# Patient Record
Sex: FEMALE | Race: WHITE | NOT HISPANIC OR LATINO | Employment: UNEMPLOYED | URBAN - NONMETROPOLITAN AREA
[De-identification: names, ages, dates, MRNs, and addresses within clinical notes are randomized per-mention and may not be internally consistent; named-entity substitution may affect disease eponyms.]

---

## 2019-11-22 ENCOUNTER — TELEPHONE (OUTPATIENT)
Dept: OBSTETRICS AND GYNECOLOGY | Facility: CLINIC | Age: 34
End: 2019-11-22

## 2019-11-22 RX ORDER — ONDANSETRON 8 MG/1
8 TABLET, ORALLY DISINTEGRATING ORAL EVERY 8 HOURS PRN
Qty: 30 TABLET | Refills: 1 | Status: SHIPPED | OUTPATIENT
Start: 2019-11-22 | End: 2020-02-05

## 2019-11-22 NOTE — TELEPHONE ENCOUNTER
This pt has a new OB appt on 11/26.  She called today c/o cramping and nausea x 2 days.  I advised her per Jennifer Shi to take 1000 mg of tylenol and comfort measures, warm bath, heating pad.  Also told the pt that if she starts bleeding bright red blood that she could go to the ER.  I sent in Zofran for her nausea.  Pt verbalized understanding of all info.

## 2019-11-26 ENCOUNTER — APPOINTMENT (OUTPATIENT)
Dept: LAB | Facility: HOSPITAL | Age: 34
End: 2019-11-26

## 2019-11-26 ENCOUNTER — INITIAL PRENATAL (OUTPATIENT)
Dept: OBSTETRICS AND GYNECOLOGY | Facility: CLINIC | Age: 34
End: 2019-11-26

## 2019-11-26 VITALS — DIASTOLIC BLOOD PRESSURE: 58 MMHG | BODY MASS INDEX: 23.74 KG/M2 | SYSTOLIC BLOOD PRESSURE: 90 MMHG | WEIGHT: 134 LBS

## 2019-11-26 DIAGNOSIS — Z32.00 PREGNANCY EXAMINATION OR TEST, PREGNANCY UNCONFIRMED: ICD-10-CM

## 2019-11-26 DIAGNOSIS — O21.9 NAUSEA AND VOMITING DURING PREGNANCY PRIOR TO 22 WEEKS GESTATION: ICD-10-CM

## 2019-11-26 DIAGNOSIS — Z34.01 PRIMIGRAVIDA IN FIRST TRIMESTER: ICD-10-CM

## 2019-11-26 DIAGNOSIS — Z32.01 POSITIVE PREGNANCY TEST: Primary | ICD-10-CM

## 2019-11-26 DIAGNOSIS — Z34.00 SUPERVISION OF NORMAL FIRST PREGNANCY, ANTEPARTUM: Primary | ICD-10-CM

## 2019-11-26 DIAGNOSIS — Z36.87 ENCOUNTER FOR ANTENATAL SCREENING FOR UNCERTAIN DATES: ICD-10-CM

## 2019-11-26 LAB
ABO GROUP BLD: NORMAL
AMPHET+METHAMPHET UR QL: NEGATIVE
AMPHETAMINES UR QL: NEGATIVE
B-HCG UR QL: POSITIVE
BARBITURATES UR QL SCN: NEGATIVE
BASOPHILS # BLD AUTO: 0.04 10*3/MM3 (ref 0–0.2)
BASOPHILS NFR BLD AUTO: 0.4 % (ref 0–1.5)
BENZODIAZ UR QL SCN: NEGATIVE
BLD GP AB SCN SERPL QL: NEGATIVE
BUPRENORPHINE SERPL-MCNC: NEGATIVE NG/ML
CANNABINOIDS SERPL QL: NEGATIVE
COCAINE UR QL: NEGATIVE
DEPRECATED RDW RBC AUTO: 38.2 FL (ref 37–54)
EOSINOPHIL # BLD AUTO: 0.12 10*3/MM3 (ref 0–0.4)
EOSINOPHIL NFR BLD AUTO: 1.2 % (ref 0.3–6.2)
ERYTHROCYTE [DISTWIDTH] IN BLOOD BY AUTOMATED COUNT: 12.7 % (ref 12.3–15.4)
HCT VFR BLD AUTO: 38.3 % (ref 34–46.6)
HGB BLD-MCNC: 13 G/DL (ref 12–15.9)
IMM GRANULOCYTES # BLD AUTO: 0.03 10*3/MM3 (ref 0–0.05)
IMM GRANULOCYTES NFR BLD AUTO: 0.3 % (ref 0–0.5)
INTERNAL NEGATIVE CONTROL: NEGATIVE
INTERNAL POSITIVE CONTROL: POSITIVE
LYMPHOCYTES # BLD AUTO: 1.32 10*3/MM3 (ref 0.7–3.1)
LYMPHOCYTES NFR BLD AUTO: 13.5 % (ref 19.6–45.3)
Lab: ABNORMAL
Lab: NORMAL
MCH RBC QN AUTO: 28.1 PG (ref 26.6–33)
MCHC RBC AUTO-ENTMCNC: 33.9 G/DL (ref 31.5–35.7)
MCV RBC AUTO: 82.7 FL (ref 79–97)
METHADONE UR QL SCN: NEGATIVE
MONOCYTES # BLD AUTO: 0.67 10*3/MM3 (ref 0.1–0.9)
MONOCYTES NFR BLD AUTO: 6.9 % (ref 5–12)
NEUTROPHILS # BLD AUTO: 7.6 10*3/MM3 (ref 1.7–7)
NEUTROPHILS NFR BLD AUTO: 77.7 % (ref 42.7–76)
NRBC BLD AUTO-RTO: 0 /100 WBC (ref 0–0.2)
OPIATES UR QL: NEGATIVE
OXYCODONE UR QL SCN: NEGATIVE
PCP UR QL SCN: NEGATIVE
PLATELET # BLD AUTO: 246 10*3/MM3 (ref 140–450)
PMV BLD AUTO: 11.1 FL (ref 6–12)
PROPOXYPH UR QL: NEGATIVE
RBC # BLD AUTO: 4.63 10*6/MM3 (ref 3.77–5.28)
RH BLD: NEGATIVE
TRICYCLICS UR QL SCN: NEGATIVE
WBC NRBC COR # BLD: 9.78 10*3/MM3 (ref 3.4–10.8)

## 2019-11-26 PROCEDURE — 87661 TRICHOMONAS VAGINALIS AMPLIF: CPT | Performed by: NURSE PRACTITIONER

## 2019-11-26 PROCEDURE — 81025 URINE PREGNANCY TEST: CPT | Performed by: NURSE PRACTITIONER

## 2019-11-26 PROCEDURE — 87086 URINE CULTURE/COLONY COUNT: CPT | Performed by: NURSE PRACTITIONER

## 2019-11-26 PROCEDURE — 85025 COMPLETE CBC W/AUTO DIFF WBC: CPT | Performed by: NURSE PRACTITIONER

## 2019-11-26 PROCEDURE — 86900 BLOOD TYPING SEROLOGIC ABO: CPT | Performed by: NURSE PRACTITIONER

## 2019-11-26 PROCEDURE — 86850 RBC ANTIBODY SCREEN: CPT | Performed by: NURSE PRACTITIONER

## 2019-11-26 PROCEDURE — 87591 N.GONORRHOEAE DNA AMP PROB: CPT | Performed by: NURSE PRACTITIONER

## 2019-11-26 PROCEDURE — 36415 COLL VENOUS BLD VENIPUNCTURE: CPT

## 2019-11-26 PROCEDURE — 86901 BLOOD TYPING SEROLOGIC RH(D): CPT | Performed by: NURSE PRACTITIONER

## 2019-11-26 PROCEDURE — 80307 DRUG TEST PRSMV CHEM ANLYZR: CPT | Performed by: NURSE PRACTITIONER

## 2019-11-26 PROCEDURE — 0501F PRENATAL FLOW SHEET: CPT | Performed by: NURSE PRACTITIONER

## 2019-11-26 PROCEDURE — 87491 CHLMYD TRACH DNA AMP PROBE: CPT | Performed by: NURSE PRACTITIONER

## 2019-11-26 PROCEDURE — 86803 HEPATITIS C AB TEST: CPT | Performed by: NURSE PRACTITIONER

## 2019-11-26 PROCEDURE — 80081 OBSTETRIC PANEL INC HIV TSTG: CPT | Performed by: NURSE PRACTITIONER

## 2019-11-26 PROCEDURE — 81003 URINALYSIS AUTO W/O SCOPE: CPT | Performed by: NURSE PRACTITIONER

## 2019-11-26 NOTE — PROGRESS NOTES
I spent approximately 60 minutes with the patient acquiring the health and history intake and discussing topics related to healthy lifestyle. This is her first pregnancy. She is accompanied by her . A newob bag is given. The 1st trimester teaching was done with the patient. We discussed a healthy diet and exercise and what is recommended. I also discussed Listeriosis and Toxoplasmosis and what fish to avoid due to high mercury levels. Informed patient not to be in hot tubs, saunas, or tanning beds. We discussed that spotting may occur after intercourse which is common, but if heavy bleeding like a period occurs to call [facility] or hospital if clinic is closed.  I encouraged her to make an appointment with the dentist if she has not had a dental exam and cleaning in the last 6 months. The patient denies use of alcohol, illicit drug use, and tobacco smoking. We discussed the hospital policy procedure if a patient has a positive urine drug screen at the time of admission to the hospital. She plans to breastfeed and formula feed. I gave her pamphlet on breastfeeding classes and the breastfeeding mothers support group that is provided by Gloria Puga, Lactation Consultant. She filled out a health department referral form. I informed patient that group prenatal education classes are offered here. I instructed patient to let the provider know if she would like to join a group after EDC is established. Her and her  are very interested in Centering.   I discussed with the patient that a pediatrician needs to be chosen prior to delivery for the infant to have an appointment scheduled before leaving the hospital.   I discussed lab tests will be done today. She is unsure when her last pap smear was. She states it has been years. I encouraged the patient to get the TDAP vaccine in the 3rd trimester. I told the patient that health departments are giving the TDAP vaccine to family members. All questions were  answered at this time.

## 2019-11-27 DIAGNOSIS — O21.9 NAUSEA AND VOMITING DURING PREGNANCY PRIOR TO 22 WEEKS GESTATION: Primary | ICD-10-CM

## 2019-11-27 PROBLEM — Z34.01 PRIMIGRAVIDA IN FIRST TRIMESTER: Status: ACTIVE | Noted: 2019-11-27

## 2019-11-27 LAB
BILIRUB UR QL STRIP: NEGATIVE
C TRACH RRNA CVX QL NAA+PROBE: NEGATIVE
CLARITY UR: ABNORMAL
COLOR UR: YELLOW
GLUCOSE UR STRIP-MCNC: NEGATIVE MG/DL
HBV SURFACE AG SERPL QL IA: NORMAL
HCV AB SER DONR QL: NORMAL
HGB UR QL STRIP.AUTO: NEGATIVE
HIV1+2 AB SER QL: NORMAL
KETONES UR QL STRIP: NEGATIVE
LEUKOCYTE ESTERASE UR QL STRIP.AUTO: NEGATIVE
N GONORRHOEA RRNA SPEC QL NAA+PROBE: NEGATIVE
NITRITE UR QL STRIP: NEGATIVE
PH UR STRIP.AUTO: 5.5 [PH] (ref 5–8)
PROT UR QL STRIP: NEGATIVE
RPR SER QL: NORMAL
RUBV IGG SERPL IA-ACNC: POSITIVE
SP GR UR STRIP: >=1.03 (ref 1–1.03)
TRICHOMONAS VAGINALIS PCR: NEGATIVE
UROBILINOGEN UR QL STRIP: ABNORMAL

## 2019-11-27 RX ORDER — DOXYLAMINE SUCCINATE AND PYRIDOXINE HYDROCHLORIDE, DELAYED RELEASE TABLETS 10 MG/10 MG 10; 10 MG/1; MG/1
2 TABLET, DELAYED RELEASE ORAL NIGHTLY PRN
Qty: 90 TABLET | Refills: 4 | Status: SHIPPED | OUTPATIENT
Start: 2019-11-27 | End: 2020-01-07

## 2019-11-27 RX ORDER — DOXYLAMINE SUCCINATE AND PYRIDOXINE HYDROCHLORIDE, DELAYED RELEASE TABLETS 10 MG/10 MG 10; 10 MG/1; MG/1
2 TABLET, DELAYED RELEASE ORAL NIGHTLY PRN
Qty: 60 TABLET | Refills: 4 | Status: SHIPPED | OUTPATIENT
Start: 2019-11-27 | End: 2019-11-27 | Stop reason: SDUPTHER

## 2019-11-28 LAB — BACTERIA SPEC AEROBE CULT: NO GROWTH

## 2019-11-28 NOTE — PROGRESS NOTES
"CC: Initial Prenatal visit    Zahra Gasca is a 34 y.o.  presents to establish prenatal care with complaints of nausea and vomitting.  Zofran, vitamin b12 and unisom providing little to no relief.  She denies any past or current medical issues.  Surgical history none.  Current medications zofran, unisom, and vitamin b12.  They would like down syndrome screening due to father of baby's brother having \"mentality of twelve year old\" not sure of exact diagnosis.      ROS: +N/V.  Pt denies cramping, dysuria, or vaginal bleeding.      PE: see NOB physical in episode tab, PE non-remarkable.  V scan utilized +HHUS           Problems (from 19 to present)     Problem Noted Resolved    Primigravida in first trimester 2019 by Jennifer Shi APRN No    Nausea and vomiting during pregnancy prior to 22 weeks gestation 2019 by Jennifer Shi APRN No          A/P: Zahra Gasca is a 34 y.o.  at Unknown.  - RTC in 2 weeks for KOFFI appt and NIPS labs     Diagnosis Plan   1. Positive pregnancy test     2. Encounter for  screening for uncertain dates  US Ob Transvaginal   3. Nausea and vomiting during pregnancy prior to 22 weeks gestation  RBA Diclegis and reviewed potential side effects  doxylamine-pyridoxine (DICLEGIS) 10-10 MG tablet delayed-release EC tablet  Small frequent protein filled snacks   4. Primigravida in first trimester  Pt prefers pap at 6 wk pp        JOANNA Simmons  2019  6:06 PM    "

## 2019-12-10 ENCOUNTER — ROUTINE PRENATAL (OUTPATIENT)
Dept: OBSTETRICS AND GYNECOLOGY | Facility: CLINIC | Age: 34
End: 2019-12-10

## 2019-12-10 VITALS — SYSTOLIC BLOOD PRESSURE: 108 MMHG | DIASTOLIC BLOOD PRESSURE: 62 MMHG | WEIGHT: 135 LBS | BODY MASS INDEX: 23.91 KG/M2

## 2019-12-10 DIAGNOSIS — Z67.91 RH NEGATIVE STATUS DURING PREGNANCY IN FIRST TRIMESTER: ICD-10-CM

## 2019-12-10 DIAGNOSIS — Z34.01 PRIMIGRAVIDA IN FIRST TRIMESTER: ICD-10-CM

## 2019-12-10 DIAGNOSIS — O26.891 RH NEGATIVE STATUS DURING PREGNANCY IN FIRST TRIMESTER: ICD-10-CM

## 2019-12-10 DIAGNOSIS — O21.9 NAUSEA AND VOMITING DURING PREGNANCY PRIOR TO 22 WEEKS GESTATION: ICD-10-CM

## 2019-12-10 DIAGNOSIS — Z3A.11 11 WEEKS GESTATION OF PREGNANCY: Primary | ICD-10-CM

## 2019-12-10 PROCEDURE — 0502F SUBSEQUENT PRENATAL CARE: CPT | Performed by: NURSE PRACTITIONER

## 2019-12-10 NOTE — PROGRESS NOTES
CC: Prenatal visit    Zahra Gasca is a 34 y.o.  at 11w0d per LMP.  Patient reports overall she is doing well since starting Diclegis every night at bedtime.  She was unable to take morning dose due to it making her drowsy.      /62   Wt 61.2 kg (135 lb)   LMP 2019   BMI 23.91 kg/m²     Reviewed NOB labs and preliminary dating scan with pt and s/o- single intrauterine pregnancy, gestational sac wnl, yolk sac visualized, CRL 10w2d which c/w with LMP, CL 3.65cm, bilateral ovaries wnl, no fluid seen in cul de sac      Fetal Heart Rate: 171 us     Problems (from 19 to present)     Problem Noted Resolved    Primigravida in first trimester 2019 by Jennifer Shi APRN No    Nausea and vomiting during pregnancy prior to 22 weeks gestation 2019 by Jennifer Shi APRN No          A/P: Zahra Gasca is a 34 y.o.  at 11w0d per LMP.  - RTC in 4 weeks for KOFFI appt      Diagnosis Plan   1. 11 weeks gestation of pregnancy     2. Nausea and vomiting during pregnancy prior to 22 weeks gestation     3. Primigravida in first trimester     4. Rh negative status during pregnancy in first trimester  Needs rhogam at 28 weeks       JOANNA Simmons  12/10/2019  3:04 PM

## 2019-12-13 DIAGNOSIS — Z36.87 ENCOUNTER FOR ANTENATAL SCREENING FOR UNCERTAIN DATES: ICD-10-CM

## 2020-01-07 ENCOUNTER — ROUTINE PRENATAL (OUTPATIENT)
Dept: OBSTETRICS AND GYNECOLOGY | Facility: CLINIC | Age: 35
End: 2020-01-07

## 2020-01-07 VITALS — DIASTOLIC BLOOD PRESSURE: 62 MMHG | BODY MASS INDEX: 25.15 KG/M2 | WEIGHT: 142 LBS | SYSTOLIC BLOOD PRESSURE: 100 MMHG

## 2020-01-07 DIAGNOSIS — Z36.3 ANTENATAL SCREENING FOR MALFORMATION USING ULTRASONICS: ICD-10-CM

## 2020-01-07 DIAGNOSIS — Z34.01 PRIMIGRAVIDA IN FIRST TRIMESTER: ICD-10-CM

## 2020-01-07 DIAGNOSIS — O21.9 NAUSEA AND VOMITING DURING PREGNANCY PRIOR TO 22 WEEKS GESTATION: ICD-10-CM

## 2020-01-07 DIAGNOSIS — Z3A.15 15 WEEKS GESTATION OF PREGNANCY: Primary | ICD-10-CM

## 2020-01-07 PROCEDURE — 0502F SUBSEQUENT PRENATAL CARE: CPT | Performed by: NURSE PRACTITIONER

## 2020-01-07 RX ORDER — PRENATAL VIT NO.126/IRON/FOLIC 28MG-0.8MG
TABLET ORAL DAILY
COMMUNITY
End: 2022-06-22

## 2020-01-08 NOTE — PROGRESS NOTES
CC: Prenatal visit    Zahra Gasca is a 34 y.o.  at 15w1d.  Doing well, diclegis continues to help but high cost is bothersome.  Patient interested in trying Bonjesta because she was told its cheaper at our [facility] pharmacy.  Sent in script.  Denies cramping, dysuria, or vaginal bleeding.      /62   Wt 64.4 kg (142 lb)   LMP 2019   BMI 25.15 kg/m²        Fetal Heart Rate: 150     Problems (from 19 to present)     Problem Noted Resolved    Primigravida in first trimester 2019 by Jennifer Shi APRN No    Nausea and vomiting during pregnancy prior to 22 weeks gestation 2019 by Jennifer Shi APRN No          A/P: Zahra Gasca is a 34 y.o.  at 15w1d.  - RTC in 4 weeks for KOFFI appt and anatomy scan or sooner if needed     Diagnosis Plan   1. 15 weeks gestation of pregnancy     2. Nausea and vomiting during pregnancy prior to 22 weeks gestation  RBA Bonjesta   3. Primigravida in first trimester     4.  screening for malformation using ultrasonics  US Ob 14 + Weeks Single or First Gestation       JOANNA Simmons  2020  9:22 AM

## 2020-01-20 ENCOUNTER — TELEPHONE (OUTPATIENT)
Dept: OBSTETRICS AND GYNECOLOGY | Facility: CLINIC | Age: 35
End: 2020-01-20

## 2020-01-20 NOTE — TELEPHONE ENCOUNTER
Called to let patient know she did have refills at the [facility]. She states she thought they were at [facility] in [place]. Told the patient she could call and have them transferred.

## 2020-01-20 NOTE — TELEPHONE ENCOUNTER
PT IS REQUESTING A REFILL ON HER DICLEGIS.  SHE IS ONLY ABLE TO HOLD ANYTHING DOWN WHILE TAKING THIS MEDICINE.  PLEASE RETURN THE PT'S CALL [phone].

## 2020-01-22 ENCOUNTER — TELEPHONE (OUTPATIENT)
Dept: OBSTETRICS AND GYNECOLOGY | Facility: CLINIC | Age: 35
End: 2020-01-22

## 2020-01-22 NOTE — TELEPHONE ENCOUNTER
Patient called wanting to know results of her Prior Auth on a medication, I informed her that I finished the PA yesterday on 1/21/20 and that it could take up to 72 hours for it to be processed. Patient was very unhappy about that and continued to cuss at me, stating that she didn't understand why the F it was taking so long and it was total BS that we didn't care about anything she had to say or how sick she was. I let her know that we did care and that it was nothing that we could do any different that it is now in the hands of her insurance. She stated that she never had to wait on a PA in [place] so why the F should she have to here in [place], I let her know that it was a different state and that insurance is different everywhere and repeated that we had done everything we could do to get this taken care of. I let her know she was welcome to speak with our supervisor after she continued to cuss me, and she just said whatever, and hung up on me.

## 2020-01-22 NOTE — TELEPHONE ENCOUNTER
HAYLEY FROM [facility] CALLED TO LET YOU KNOW THAT A PA NEEDS TO BE DONE FOR DOXYLAMINE SUCCONATE PYRIDO.  PLEASE CONTACT [phone] TO AUTHORIZE THIS MEDICINE.

## 2020-01-23 ENCOUNTER — TELEPHONE (OUTPATIENT)
Dept: OBSTETRICS AND GYNECOLOGY | Facility: CLINIC | Age: 35
End: 2020-01-23

## 2020-01-23 NOTE — TELEPHONE ENCOUNTER
Received call from insurance that Bonjesta had been approved.  Called and left message on voicemail for patient informing her of this.

## 2020-01-28 ENCOUNTER — ROUTINE PRENATAL (OUTPATIENT)
Dept: OBSTETRICS AND GYNECOLOGY | Facility: CLINIC | Age: 35
End: 2020-01-28

## 2020-01-28 VITALS — BODY MASS INDEX: 25.51 KG/M2 | WEIGHT: 144 LBS | DIASTOLIC BLOOD PRESSURE: 62 MMHG | SYSTOLIC BLOOD PRESSURE: 104 MMHG

## 2020-01-28 DIAGNOSIS — O21.9 NAUSEA AND VOMITING DURING PREGNANCY PRIOR TO 22 WEEKS GESTATION: ICD-10-CM

## 2020-01-28 DIAGNOSIS — O44.42 LOW LYING PLACENTA NOS OR WITHOUT HEMORRHAGE, SECOND TRIMESTER: ICD-10-CM

## 2020-01-28 DIAGNOSIS — Z3A.18 18 WEEKS GESTATION OF PREGNANCY: ICD-10-CM

## 2020-01-28 DIAGNOSIS — O09.92 SUPERVISION OF HIGH RISK PREGNANCY IN SECOND TRIMESTER: Primary | ICD-10-CM

## 2020-01-28 DIAGNOSIS — O26.892 RH NEGATIVE STATUS DURING PREGNANCY IN SECOND TRIMESTER: ICD-10-CM

## 2020-01-28 DIAGNOSIS — Z67.91 RH NEGATIVE STATUS DURING PREGNANCY IN SECOND TRIMESTER: ICD-10-CM

## 2020-01-28 PROBLEM — Z34.92 ENCOUNTER FOR SUPERVISION OF NORMAL PREGNANCY IN SECOND TRIMESTER: Status: ACTIVE | Noted: 2019-11-27

## 2020-01-28 PROCEDURE — 0502F SUBSEQUENT PRENATAL CARE: CPT | Performed by: OBSTETRICS & GYNECOLOGY

## 2020-01-28 NOTE — PROGRESS NOTES
CC: Prenatal visit    Zahra Gasca is a 34 y.o.  at 18w0d.  Doing well.  No complaints.  Denies contractions, LOF, or VB.  Reports good FM.    /62   Wt 65.3 kg (144 lb)   LMP 2019   BMI 25.51 kg/m²   Fetal Heart Rate: 139    Prelim US- EFW 179g, MIKIE WNL, breech, placenta anterior low-lying (1.3 cm from internal os), anatomy WNL w/ subopt views, cervix 4.7cm, GIRL     Problems (from 19 to present)     Problem Noted Resolved    Low lying placenta nos or without hemorrhage, second trimester 2020 by Alysha Curran MD No    Overview Signed 2020  3:50 PM by Alysha Curran MD     - Placenta 1.3 cm from internal os  Repeat US in 4 weeks         Rh negative status during pregnancy in second trimester 2020 by Alysha Curran MD No    Overview Signed 2020  3:52 PM by Alysha Curran MD     Needs RhoGAM at 28 weeks         Supervision of high risk pregnancy in second trimester 2019 by Jennifer Shi APRN No    Overview Signed 2020  3:51 PM by Alysha Curran MD     B neg / Rubella immune / GBS unk  Dating: LMP = 1TUS (10wk)  Genetics: Declines  Tdap: 28wk  Flu: Declines  Anatomy: WNL w/ subopt views- GIRL  1h Glucola: 28wk  H&H/Plts: 28wk  Lab Results   Component Value Date    HGB 13.0 2019    HCT 38.3 2019     2019   Breast/BC undecided         Nausea and vomiting during pregnancy prior to 22 weeks gestation 2019 by Jennifer Shi APRN No    Overview Signed 2020  3:52 PM by Alysha Curran MD     Controlled on Diclegis             A/P: Zahra Gasca is a 34 y.o.  at 18w0d.  - RTC in 4 weeks  - Repeat US in 4 weeks for subopt views and evaluate placenta  - Discussed ALEXEY.  Will keep based on LMP since sure LMP with only 5 days difference from 1TUS.     Diagnosis Plan   1. Supervision of high risk  pregnancy in second trimester     2. Nausea and vomiting during pregnancy prior to 22 weeks gestation     3. 18 weeks gestation of pregnancy       Alysha Curran MD  1/28/2020  3:52 PM

## 2020-02-03 DIAGNOSIS — Z36.3 ANTENATAL SCREENING FOR MALFORMATION USING ULTRASONICS: ICD-10-CM

## 2020-02-04 ENCOUNTER — TELEPHONE (OUTPATIENT)
Dept: OBSTETRICS AND GYNECOLOGY | Facility: CLINIC | Age: 35
End: 2020-02-04

## 2020-02-05 ENCOUNTER — TELEPHONE (OUTPATIENT)
Dept: OBSTETRICS AND GYNECOLOGY | Facility: CLINIC | Age: 35
End: 2020-02-05

## 2020-02-05 NOTE — TELEPHONE ENCOUNTER
PT CALLED AND ASKED TO SPEAK WITH DR PENA. I INFORMED HER SHE WAS IN SURGERY TODAY. I ASKED THE PATIENT WAS SHE HAVING A PROBLEM. SHE STATED SHE HAS BEEN NAUSEATED AND HAS HAD DIARRHEA. I SUGGESTED TRYING IMODIUM AD AND TAKING HER ZOFRAN, OR EVEN GOING TO AN URGENT CARE TO SEE IF SHE HAS THE FLU OR STOMACH BUG. PT VERBALIZED UNDERSTANDING.

## 2020-02-25 ENCOUNTER — APPOINTMENT (OUTPATIENT)
Dept: GENERAL RADIOLOGY | Facility: HOSPITAL | Age: 35
End: 2020-02-25

## 2020-02-25 ENCOUNTER — ROUTINE PRENATAL (OUTPATIENT)
Dept: OBSTETRICS AND GYNECOLOGY | Facility: CLINIC | Age: 35
End: 2020-02-25

## 2020-02-25 ENCOUNTER — HOSPITAL ENCOUNTER (EMERGENCY)
Facility: HOSPITAL | Age: 35
Discharge: HOME | End: 2020-02-25
Attending: EMERGENCY MEDICINE | Admitting: EMERGENCY MEDICINE

## 2020-02-25 VITALS — WEIGHT: 151 LBS | SYSTOLIC BLOOD PRESSURE: 112 MMHG | DIASTOLIC BLOOD PRESSURE: 60 MMHG | BODY MASS INDEX: 26.75 KG/M2

## 2020-02-25 VITALS
HEART RATE: 70 BPM | HEIGHT: 63 IN | RESPIRATION RATE: 20 BRPM | SYSTOLIC BLOOD PRESSURE: 105 MMHG | DIASTOLIC BLOOD PRESSURE: 68 MMHG | BODY MASS INDEX: 26.75 KG/M2 | WEIGHT: 151 LBS | OXYGEN SATURATION: 97 % | TEMPERATURE: 99 F

## 2020-02-25 DIAGNOSIS — O21.9 NAUSEA AND VOMITING DURING PREGNANCY PRIOR TO 22 WEEKS GESTATION: ICD-10-CM

## 2020-02-25 DIAGNOSIS — O09.92 SUPERVISION OF HIGH RISK PREGNANCY IN SECOND TRIMESTER: Primary | ICD-10-CM

## 2020-02-25 DIAGNOSIS — O99.340 ANXIETY DURING PREGNANCY: ICD-10-CM

## 2020-02-25 DIAGNOSIS — Z3A.22 22 WEEKS GESTATION OF PREGNANCY: ICD-10-CM

## 2020-02-25 DIAGNOSIS — M94.0 ACUTE COSTOCHONDRITIS: Primary | ICD-10-CM

## 2020-02-25 DIAGNOSIS — R12 HEARTBURN DURING PREGNANCY IN SECOND TRIMESTER: ICD-10-CM

## 2020-02-25 DIAGNOSIS — R07.9 CHEST PAIN, UNSPECIFIED TYPE: ICD-10-CM

## 2020-02-25 DIAGNOSIS — F41.9 ANXIETY DURING PREGNANCY: ICD-10-CM

## 2020-02-25 DIAGNOSIS — M54.9 BACK PAIN IN PREGNANCY: ICD-10-CM

## 2020-02-25 DIAGNOSIS — O99.891 BACK PAIN IN PREGNANCY: ICD-10-CM

## 2020-02-25 DIAGNOSIS — O26.892 HEARTBURN DURING PREGNANCY IN SECOND TRIMESTER: ICD-10-CM

## 2020-02-25 DIAGNOSIS — O44.42 LOW LYING PLACENTA NOS OR WITHOUT HEMORRHAGE, SECOND TRIMESTER: ICD-10-CM

## 2020-02-25 DIAGNOSIS — Z67.91 RH NEGATIVE STATUS DURING PREGNANCY IN SECOND TRIMESTER: ICD-10-CM

## 2020-02-25 DIAGNOSIS — O26.892 RH NEGATIVE STATUS DURING PREGNANCY IN SECOND TRIMESTER: ICD-10-CM

## 2020-02-25 LAB
ALBUMIN SERPL-MCNC: 3.6 G/DL (ref 3.5–5.2)
ALBUMIN/GLOB SERPL: 1.3 G/DL
ALP SERPL-CCNC: 63 U/L (ref 39–117)
ALT SERPL W P-5'-P-CCNC: 21 U/L (ref 1–33)
ANION GAP SERPL CALCULATED.3IONS-SCNC: 13 MMOL/L (ref 5–15)
APTT PPP: 23.9 SECONDS (ref 20–40.3)
AST SERPL-CCNC: 13 U/L (ref 1–32)
BASOPHILS # BLD AUTO: 0.02 10*3/MM3 (ref 0–0.2)
BASOPHILS NFR BLD AUTO: 0.2 % (ref 0–1.5)
BILIRUB SERPL-MCNC: <0.2 MG/DL (ref 0.2–1.2)
BILIRUB UR QL STRIP: NEGATIVE
BUN BLD-MCNC: 13 MG/DL (ref 6–20)
BUN/CREAT SERPL: 22 (ref 7–25)
CALCIUM SPEC-SCNC: 8.9 MG/DL (ref 8.6–10.5)
CHLORIDE SERPL-SCNC: 103 MMOL/L (ref 98–107)
CLARITY UR: CLEAR
CO2 SERPL-SCNC: 21 MMOL/L (ref 22–29)
COLOR UR: YELLOW
CREAT BLD-MCNC: 0.59 MG/DL (ref 0.57–1)
D-DIMER, QUANTITATIVE (MAD,POW, STR): 816 NG/ML (FEU) (ref 0–470)
DEPRECATED RDW RBC AUTO: 42.1 FL (ref 37–54)
EOSINOPHIL # BLD AUTO: 0.08 10*3/MM3 (ref 0–0.4)
EOSINOPHIL NFR BLD AUTO: 0.8 % (ref 0.3–6.2)
ERYTHROCYTE [DISTWIDTH] IN BLOOD BY AUTOMATED COUNT: 13.8 % (ref 12.3–15.4)
GFR SERPL CREATININE-BSD FRML MDRD: 117 ML/MIN/1.73
GLOBULIN UR ELPH-MCNC: 2.7 GM/DL
GLUCOSE BLD-MCNC: 85 MG/DL (ref 65–99)
GLUCOSE UR STRIP-MCNC: NEGATIVE MG/DL
HCG SERPL QL: POSITIVE
HCT VFR BLD AUTO: 35.3 % (ref 34–46.6)
HGB BLD-MCNC: 11.9 G/DL (ref 12–15.9)
HGB UR QL STRIP.AUTO: NEGATIVE
HOLD SPECIMEN: NORMAL
IMM GRANULOCYTES # BLD AUTO: 0.04 10*3/MM3 (ref 0–0.05)
IMM GRANULOCYTES NFR BLD AUTO: 0.4 % (ref 0–0.5)
INR PPP: 0.94 (ref 0.8–1.2)
KETONES UR QL STRIP: NEGATIVE
LEUKOCYTE ESTERASE UR QL STRIP.AUTO: NEGATIVE
LYMPHOCYTES # BLD AUTO: 1.51 10*3/MM3 (ref 0.7–3.1)
LYMPHOCYTES NFR BLD AUTO: 15.6 % (ref 19.6–45.3)
MCH RBC QN AUTO: 28.3 PG (ref 26.6–33)
MCHC RBC AUTO-ENTMCNC: 33.7 G/DL (ref 31.5–35.7)
MCV RBC AUTO: 84 FL (ref 79–97)
MONOCYTES # BLD AUTO: 0.53 10*3/MM3 (ref 0.1–0.9)
MONOCYTES NFR BLD AUTO: 5.5 % (ref 5–12)
NEUTROPHILS # BLD AUTO: 7.47 10*3/MM3 (ref 1.7–7)
NEUTROPHILS NFR BLD AUTO: 77.5 % (ref 42.7–76)
NITRITE UR QL STRIP: NEGATIVE
NRBC BLD AUTO-RTO: 0 /100 WBC (ref 0–0.2)
PH UR STRIP.AUTO: 5.5 [PH] (ref 5–9)
PLATELET # BLD AUTO: 220 10*3/MM3 (ref 140–450)
PMV BLD AUTO: 10.6 FL (ref 6–12)
POTASSIUM BLD-SCNC: 4 MMOL/L (ref 3.5–5.2)
PROT SERPL-MCNC: 6.3 G/DL (ref 6–8.5)
PROT UR QL STRIP: NEGATIVE
PROTHROMBIN TIME: 12.4 SECONDS (ref 11.1–15.3)
RBC # BLD AUTO: 4.2 10*6/MM3 (ref 3.77–5.28)
SODIUM BLD-SCNC: 137 MMOL/L (ref 136–145)
SP GR UR STRIP: 1.01 (ref 1–1.03)
TROPONIN T SERPL-MCNC: <0.01 NG/ML (ref 0–0.03)
TROPONIN T SERPL-MCNC: <0.01 NG/ML (ref 0–0.03)
UROBILINOGEN UR QL STRIP: NORMAL
WBC NRBC COR # BLD: 9.65 10*3/MM3 (ref 3.4–10.8)
WHOLE BLOOD HOLD SPECIMEN: NORMAL

## 2020-02-25 PROCEDURE — 36415 COLL VENOUS BLD VENIPUNCTURE: CPT

## 2020-02-25 PROCEDURE — 96361 HYDRATE IV INFUSION ADD-ON: CPT

## 2020-02-25 PROCEDURE — 80053 COMPREHEN METABOLIC PANEL: CPT | Performed by: EMERGENCY MEDICINE

## 2020-02-25 PROCEDURE — 84703 CHORIONIC GONADOTROPIN ASSAY: CPT | Performed by: EMERGENCY MEDICINE

## 2020-02-25 PROCEDURE — 93005 ELECTROCARDIOGRAM TRACING: CPT

## 2020-02-25 PROCEDURE — 85025 COMPLETE CBC W/AUTO DIFF WBC: CPT | Performed by: EMERGENCY MEDICINE

## 2020-02-25 PROCEDURE — 81003 URINALYSIS AUTO W/O SCOPE: CPT | Performed by: EMERGENCY MEDICINE

## 2020-02-25 PROCEDURE — 99284 EMERGENCY DEPT VISIT MOD MDM: CPT

## 2020-02-25 PROCEDURE — 71046 X-RAY EXAM CHEST 2 VIEWS: CPT

## 2020-02-25 PROCEDURE — 93010 ELECTROCARDIOGRAM REPORT: CPT | Performed by: INTERNAL MEDICINE

## 2020-02-25 PROCEDURE — 85610 PROTHROMBIN TIME: CPT | Performed by: EMERGENCY MEDICINE

## 2020-02-25 PROCEDURE — 85379 FIBRIN DEGRADATION QUANT: CPT | Performed by: EMERGENCY MEDICINE

## 2020-02-25 PROCEDURE — 93005 ELECTROCARDIOGRAM TRACING: CPT | Performed by: EMERGENCY MEDICINE

## 2020-02-25 PROCEDURE — 85730 THROMBOPLASTIN TIME PARTIAL: CPT | Performed by: EMERGENCY MEDICINE

## 2020-02-25 PROCEDURE — 96374 THER/PROPH/DIAG INJ IV PUSH: CPT

## 2020-02-25 PROCEDURE — 0502F SUBSEQUENT PRENATAL CARE: CPT | Performed by: OBSTETRICS & GYNECOLOGY

## 2020-02-25 PROCEDURE — 84484 ASSAY OF TROPONIN QUANT: CPT | Performed by: EMERGENCY MEDICINE

## 2020-02-25 RX ORDER — SODIUM CHLORIDE 9 MG/ML
125 INJECTION, SOLUTION INTRAVENOUS CONTINUOUS
Status: DISCONTINUED | OUTPATIENT
Start: 2020-02-25 | End: 2020-02-26 | Stop reason: HOSPADM

## 2020-02-25 RX ORDER — SODIUM CHLORIDE 0.9 % (FLUSH) 0.9 %
10 SYRINGE (ML) INJECTION AS NEEDED
Status: DISCONTINUED | OUTPATIENT
Start: 2020-02-25 | End: 2020-02-25

## 2020-02-25 RX ORDER — FAMOTIDINE 10 MG/ML
20 INJECTION, SOLUTION INTRAVENOUS ONCE
Status: COMPLETED | OUTPATIENT
Start: 2020-02-25 | End: 2020-02-25

## 2020-02-25 RX ORDER — CITALOPRAM 20 MG/1
20 TABLET ORAL DAILY
Qty: 30 TABLET | Refills: 5 | Status: SHIPPED | OUTPATIENT
Start: 2020-02-25 | End: 2020-04-21

## 2020-02-25 RX ORDER — FAMOTIDINE 20 MG/1
20 TABLET, FILM COATED ORAL 2 TIMES DAILY
Qty: 60 TABLET | Refills: 1 | Status: SHIPPED | OUTPATIENT
Start: 2020-02-25 | End: 2020-05-11 | Stop reason: SDUPTHER

## 2020-02-25 RX ORDER — SODIUM CHLORIDE 0.9 % (FLUSH) 0.9 %
10 SYRINGE (ML) INJECTION AS NEEDED
Status: DISCONTINUED | OUTPATIENT
Start: 2020-02-25 | End: 2020-02-26 | Stop reason: HOSPADM

## 2020-02-25 RX ADMIN — SODIUM CHLORIDE 125 ML/HR: 900 INJECTION, SOLUTION INTRAVENOUS at 19:27

## 2020-02-25 RX ADMIN — FAMOTIDINE 20 MG: 10 INJECTION INTRAVENOUS at 19:28

## 2020-02-25 NOTE — PROGRESS NOTES
CC: Prenatal visit    Zahra Gasca is a 34 y.o.  at 22w0d.  Doing well.  Denies contractions, LOF, or VB.  Reports +FM.  Reports that she has been having intermittent sharp, shooting substernal chest pain.  Denies radiation.  Denies jaw pain.  Reports some SOB.  Reports strong family history of cardiac disease.  Reports low back pain.  Reports mood swings with anxiety.    /60   Wt 68.5 kg (151 lb)   LMP 2019   BMI 26.75 kg/m²   Fundal Height (cm): 21 cm  Fetal Heart Rate: 156    Prelim US- EFW 402g, MIKIE WNL, cephalic, placenta anterior (low-lying resolved), previous subopt views obtained and WNL     Problems (from 19 to present)     Problem Noted Resolved    Low lying placenta nos or without hemorrhage, second trimester 2020 by Alysha Curran MD No    Overview Signed 2020  3:50 PM by Alysha Curran MD     - Placenta 1.3 cm from internal os  Repeat US in 4 weeks         Rh negative status during pregnancy in second trimester 2020 by Alysha Curran MD No    Overview Signed 2020  3:52 PM by Alysha Curran MD     Needs RhoGAM at 28 weeks         Supervision of high risk pregnancy in second trimester 2019 by Jennifer Shi APRN No    Overview Signed 2020  3:51 PM by Alysha Curran MD     B neg / Rubella immune / GBS unk  Dating: LMP = 1TUS (10wk)  Genetics: Declines  Tdap: 28wk  Flu: Declines  Anatomy: WNL w/ subopt views- GIRL  1h Glucola: 28wk  H&H/Plts: 28wk  Lab Results   Component Value Date    HGB 13.0 2019    HCT 38.3 2019     2019   Breast/BC undecided         Nausea and vomiting during pregnancy prior to 22 weeks gestation 2019 by Jennifer Shi APRN No    Overview Signed 2020  3:52 PM by Alysha Curran MD     Controlled on Diclegis             A/P: Zahra Gasca is a 34 y.o.  at  22w0d.  - RTC in 4 weeks  - 3T labs at that time  - Recommend ED evaluation for CP in pregnancy.  Discussed that likely costochondritis or GERD but given her pregnancy state and strong family history of cardiac disease, recommend evaluation.  Patient and  agreeable.  I personally called the ED to let them know she was coming.  Also started on Pepcid BID in the event that it is reflux.     Diagnosis Plan   1. Supervision of high risk pregnancy in second trimester  Glucose, Post 50 Gm Glucola    CBC & Differential   2. Low lying placenta nos or without hemorrhage, second trimester     3. Rh negative status during pregnancy in second trimester     4. Nausea and vomiting during pregnancy prior to 22 weeks gestation     5. 22 weeks gestation of pregnancy     6. Back pain in pregnancy  Ambulatory Referral to Physical Therapy Evaluate and treat   7. Chest pain, unspecified type     8. Anxiety during pregnancy  citalopram (CELEXA) 20 MG tablet   9. Heartburn during pregnancy in second trimester  famotidine (PEPCID) 20 MG tablet     Alysha Curran MD  2/25/2020  6:30 PM

## 2020-02-26 DIAGNOSIS — Z3A.22 22 WEEKS GESTATION OF PREGNANCY: Primary | ICD-10-CM

## 2020-02-26 NOTE — DISCHARGE INSTRUCTIONS
Return ED chest pain, shortness of air, syncope, palpitations, vaginal bleeding, contractions, loss of fluid, worse condition, other concerns

## 2020-02-26 NOTE — ED PROVIDER NOTES
"Subjective   35yo  @ 22 1/7wks gestation, EDC 2020, presents ED c/o 1d hx continuous substernal chest pain since 0610hrs characterized as \"sharp\"/radiating left shoulder/neg exac or relieve factors/neg assoc symptoms.  ROS neg fever/chills/cough/hemoptysis/abd pain/vaginal bleeding/contractions/loss of fluid.  ROS (+) fetal movement.      History provided by:  Patient  Chest Pain   Pain location:  Substernal area  Pain quality: sharp    Pain radiates to:  L shoulder  Pain severity:  Moderate  Duration:  1 day  Timing:  Constant  Associated symptoms: no cough, no palpitations and no shortness of breath        Review of Systems   Constitutional: Negative.    HENT: Negative.    Respiratory: Negative.  Negative for cough and shortness of breath.    Cardiovascular: Positive for chest pain. Negative for palpitations and leg swelling.   Gastrointestinal: Negative.    Genitourinary: Negative.    Musculoskeletal: Negative.    Skin: Negative.    All other systems reviewed and are negative.      History reviewed. No pertinent past medical history.    No Known Allergies    History reviewed. No pertinent surgical history.    Family History   Problem Relation Age of Onset   • Heart disease Father    • No Known Problems Mother    • No Known Problems Brother    • Cancer Paternal Grandfather    • Heart disease Paternal Grandmother    • Heart disease Maternal Grandfather        Social History     Socioeconomic History   • Marital status:      Spouse name: Not on file   • Number of children: Not on file   • Years of education: Not on file   • Highest education level: Not on file   Tobacco Use   • Smoking status: Former Smoker   • Smokeless tobacco: Never Used   • Tobacco comment: quit 10 years ago   Substance and Sexual Activity   • Alcohol use: No     Frequency: Never   • Drug use: No   • Sexual activity: Yes     Partners: Male     Comment: unsure about last pap smear            Objective   Physical Exam   "   Constitutional: She is oriented to person, place, and time. She appears well-developed and well-nourished.   HENT:   Head: Normocephalic and atraumatic.   Mouth/Throat: Oropharynx is clear and moist.   Eyes: Pupils are equal, round, and reactive to light.   Neck: Normal range of motion. Neck supple. No JVD present. No tracheal deviation present.   Cardiovascular: Normal rate, regular rhythm, normal heart sounds and intact distal pulses. Exam reveals no gallop and no friction rub.   No murmur heard.  Pulmonary/Chest: Effort normal and breath sounds normal. No stridor. She has no wheezes. She has no rales. She exhibits tenderness.       Abdominal: Soft. Bowel sounds are normal. She exhibits no mass. There is no tenderness. There is no rebound and no guarding.       Musculoskeletal: Normal range of motion. She exhibits no edema or tenderness.   Lymphadenopathy:     She has no cervical adenopathy.   Neurological: She is alert and oriented to person, place, and time.   Skin: Skin is warm and dry.   Nursing note and vitals reviewed.      ECG 12 Lead    Date/Time: 2/25/2020 9:06 PM  Performed by: Yash Bro MD  Authorized by: Yash Bro MD   Interpreted by physician  Rhythm: sinus rhythm  Rate: normal  BPM: 73  QRS axis: normal  Conduction: conduction normal  ST Segments: ST segments normal  T Waves: T waves normal  Other: no other findings  Clinical impression: normal ECG                 ED Course  ED Course as of Feb 25 2311   Tue Feb 25, 2020   2101 PERC 8: negative  D dimer: normal pregnancy adjusted <1000    [SD]      ED Course User Index  [SD] Yash Bro MD      Labs Reviewed   COMPREHENSIVE METABOLIC PANEL - Abnormal; Notable for the following components:       Result Value    CO2 21.0 (*)     Total Bilirubin <0.2 (*)     All other components within normal limits    Narrative:     GFR Normal >60  Chronic Kidney Disease <60  Kidney Failure <15     CBC WITH AUTO DIFFERENTIAL - Abnormal; Notable for  the following components:    Hemoglobin 11.9 (*)     Neutrophil % 77.5 (*)     Lymphocyte % 15.6 (*)     Neutrophils, Absolute 7.47 (*)     All other components within normal limits   HCG, SERUM, QUALITATIVE - Abnormal; Notable for the following components:    HCG Qualitative Positive (*)     All other components within normal limits   D-DIMER, QUANTITATIVE - Abnormal; Notable for the following components:    D-Dimer, Quantitative 816 (*)     All other components within normal limits    Narrative:     Dimer values <500 ng/ml FEU are FDA approved as aid in diagnosis of deep venous thrombosis and pulmonary embolism.  This test should not be used in an exclusion strategy with pretest probability alone.    A recent guideline regarding diagnosis for pulmonary thromboembolism recommends an adjusted exclusion criterion of age x 10 ng/ml FEU for patients >50 years of age ([employer] Intern Med 2015; 163: 701-711).     TROPONIN (IN-HOUSE) - Normal    Narrative:     Troponin T Reference Range:  <= 0.03 ng/mL-   Negative for AMI  >0.03 ng/mL-     Abnormal for myocardial necrosis.  Clinicians would have to utilize clinical acumen, EKG, Troponin and serial changes to determine if it is an Acute Myocardial Infarction or myocardial injury due to an underlying chronic condition.       Results may be falsely decreased if patient taking Biotin.     PROTIME-INR - Normal    Narrative:     Therapeutic range for most indications is 2.0-3.0 INR,  or 2.5-3.5 for mechanical heart valves.   APTT - Normal    Narrative:     The recommended Heparin therapeutic range is 68-97 seconds.   URINALYSIS W/ MICROSCOPIC IF INDICATED (NO CULTURE) - Normal    Narrative:     Urine microscopic not indicated.   TROPONIN (IN-HOUSE) - Normal    Narrative:     Troponin T Reference Range:  <= 0.03 ng/mL-   Negative for AMI  >0.03 ng/mL-     Abnormal for myocardial necrosis.  Clinicians would have to utilize clinical acumen, EKG, Troponin and serial changes to determine  if it is an Acute Myocardial Infarction or myocardial injury due to an underlying chronic condition.       Results may be falsely decreased if patient taking Biotin.     RAINBOW DRAW    Narrative:     The following orders were created for panel order [facility] Draw.  Procedure                               Abnormality         Status                     ---------                               -----------         ------                     Light Blue Top[[id number]]                                                              Green Top (Gel)[[id number]]                                  Final result               Lavender Top[[id number]]                                     Final result               Gold Top - SST[[id number]]                                                                Please view results for these tests on the individual orders.   CBC AND DIFFERENTIAL    Narrative:     The following orders were created for panel order CBC & Differential.  Procedure                               Abnormality         Status                     ---------                               -----------         ------                     CBC Auto Differential[[id number]]        Abnormal            Final result                 Please view results for these tests on the individual orders.   GREEN TOP   LAVENDER TOP     Xr Chest 2 View    Result Date: 2/25/2020  Narrative: PROCEDURE: XR CHEST 2 VW VIEWS: PA/Lat INDICATION: Chest pain COMPARISON: CXR: 1/4/2012 FINDINGS:   - lines/tubes: none   - cardiac: size within normal limits.   - mediastinum: contour within normal limits.   - lungs: no evidence of a focal air space process, pulmonary interstitial edema, nodule(s)/mass.   - pleura: no evidence of  fluid.    - osseous: unremarkable for age.     Impression: No acute cardiopulmonary process identified Electronically signed by:  Pari Longo MD  2/25/2020 8:25 PM CST Workstation: [id number]                                       HEART Score  (for prediction of 6-week risk of major adverse cardiac event) reviewed and/or performed as part of the patient evaluation and treatment planning process.  The result associated with this review/performance is: 1       MDM  Number of Diagnoses or Management Options  22 weeks gestation of pregnancy: minor  Acute costochondritis: new and requires workup  Chest pain, unspecified type: new and requires workup  Diagnosis management comments: HEART score=1 (low risk).  HEART protocol negative. Loren negative x2. EKG neg ischemia. Perc 8/d dimer negative. Reproducible chest wall tenderness c/w costochondritis. Stable discharge.       Amount and/or Complexity of Data Reviewed  Clinical lab tests: reviewed  Tests in the radiology section of CPT®: reviewed  Tests in the medicine section of CPT®: reviewed        Final diagnoses:   Acute costochondritis   Chest pain, unspecified type   22 weeks gestation of pregnancy            Yash Bro MD  02/25/20 [id number]

## 2020-03-02 DIAGNOSIS — Z3A.22 22 WEEKS GESTATION OF PREGNANCY: ICD-10-CM

## 2020-03-17 ENCOUNTER — HOSPITAL ENCOUNTER (OUTPATIENT)
Dept: PHYSICAL THERAPY | Facility: HOSPITAL | Age: 35
Setting detail: THERAPIES SERIES
Discharge: HOME | End: 2020-03-17

## 2020-03-17 DIAGNOSIS — O99.891 BACK PAIN IN PREGNANCY: Primary | ICD-10-CM

## 2020-03-17 DIAGNOSIS — M54.9 BACK PAIN IN PREGNANCY: Primary | ICD-10-CM

## 2020-03-17 PROCEDURE — 97535 SELF CARE MNGMENT TRAINING: CPT | Performed by: PHYSICAL THERAPIST

## 2020-03-17 PROCEDURE — 97162 PT EVAL MOD COMPLEX 30 MIN: CPT | Performed by: PHYSICAL THERAPIST

## 2020-03-17 NOTE — THERAPY EVALUATION
Outpatient Physical Therapy Pelvic Health Initial Evaluation  [facility]     Patient Name: Zahra Gasca  : 1985  MRN: [id number]  Today's Date: 3/17/2020        Visit Date: 2020   Recheck: 20  Insurance: Rashida   Visit Number:       Patient Active Problem List   Diagnosis   • Supervision of high risk pregnancy in second trimester   • Nausea and vomiting during pregnancy prior to 22 weeks gestation   • Low lying placenta nos or without hemorrhage, second trimester   • Rh negative status during pregnancy in second trimester        History reviewed. No pertinent past medical history.     History reviewed. No pertinent surgical history.      Visit Dx:    ICD-10-CM ICD-9-CM   1. Back pain in pregnancy O99.89 646.80    M54.9 724.5           Pelvic Health     Row Name 20 1600             Pregnancy Questions    Number of Pregnancies  1  -SW      Number of Miscarriages  0  -SW      How many weeks pregnant are you?  25 weeks  -SW      Due Date  20  -SW        User Key  (r) = Recorded By, (t) = Taken By, (c) = Cosigned By    Initials Name Provider Type    SW Lissette Waggoner, PT DPT Physical Therapist        PT Ortho     Row Name 20 1600       Subjective Comments    Subjective Comments  Sits at work as .  Bottom part of back kills me with seated position.  Radiates into shoulder blades.  Shoulder arthritis noted.  Really not sure what the problem is.  She describes pain as being around the abdomen and along the sides as well.  Went for walk and notes that pain is present around belly and into pubic bones.  I am sure that I am not good with posture.    -SW       Subjective Pain    Able to rate subjective pain?  yes  -SW    Pre-Treatment Pain Level  4  -SW    Post-Treatment Pain Level  4  -SW       Posture/Observations    Posture/Observations Comments  Patient presents to clinic with .  Alert and oriented.  Gait unremarkable.  Alignment in standing  with slight elevation of R IC in standing.  Transfers indepedently.   -SW       Quarter Clearing    Quarter Clearing  Lower Quarter Clearing  -SW       DTR- Lower Quarter Clearing    Patellar tendon (L2-4)  2- Normal response  -SW    Achilles tendon (S1-2)  2- Normal response  -SW       Neural Tension Signs- Lower Quarter Clearing    Slump  Negative  -SW    SLR  Negative  -SW       Sensory Screen for Light Touch- Lower Quarter Clearing    L1 (inguinal area)  Intact  -SW    L2 (anterior mid thigh)  Intact  -SW    L3 (distal anterior thigh)  Intact  -SW    L4 (medial lower leg/foot)  Intact  -SW    L5 (lateral lower leg/great toe)  Intact  -SW    S1 (bottom of foot)  Intact  -SW       Myotomal Screen- Lower Quarter Clearing    Hip flexion (L2)  5 (Normal)  -SW    Knee extension (L3)  5 (Normal)  -SW    Knee flexion (S2)  5 (Normal)  -SW       Lumbar ROM Screen- Lower Quarter Clearing    Lumbar Flexion  Normal  -SW    Lumbar Extension  Normal  -SW    Lumbar Lateral Flexion  Normal  -SW    Lumbar Rotation  Normal  -SW       SI/Hip Screen- Lower Quarter Clearing    ASIS compression  Positive  -SW    ASIS distraction  Negative  -SW    Matty's/Broderick's test  Right:;Positive  -SW    Pain in Georgina's area  Bilateral:;Positive  -SW       Special Tests/Palpation    Special Tests/Palpation  Lumbar/SI  -SW       Lumbosacral Accessory Motions    Lumbosacral Accessory Motions Tested?  Yes Lower lumbar rotation toward L  -SW    PA glide- Sacral base  -- symmetrical with good AP glide.  -SW    Innominate rotation  -- ant R rotation  -SW       Lumbosacral Palpation    Lumbosacral Palpation?  Yes  -SW    Lumbosacral Segment  Left:;Tender;Guarded/taut;Trigger point  -    Quadratus Lumborum  Bilateral:;Tender  -    Erector Spinae (Paraspinals)  Left:;Tender;Guarded/taut  -    Lumbosacral Palpation Comments  patient presents with inc tension along the lumbar paraspinals L>R.  Slight rotation noted among lower lumbar segments with  rotation to L.  Good AP glide noted throughout without pain.  Audible pop with AP glide to L2 noted.    -SW       General ROM    GENERAL ROM COMMENTS  functional   -SW       MMT (Manual Muscle Testing)    General MMT Comments  functional   -SW       Flexibility    Flexibility Tested?  Lower Extremity  -SW       Lower Extremity Flexibility    Overall LE Flexibility  Moderately limited rotators and hamstrings bilaterally  -SW       Balance Skills Training    Balance Comments  normal balance  -SW       Transfers    Comment (Transfers)  independent with all transfer.  -SW       Gait/Stairs Assessment/Training    Comment (Gait/Stairs)  gait unremarkable   -SW      User Key  (r) = Recorded By, (t) = Taken By, (c) = Cosigned By    Initials Name Provider Type    Lissette De Leon, PT DPT Physical Therapist                     PT Assessment/Plan     Row Name 03/17/20 1700          PT Assessment    Functional Limitations  Performance in self-care ADL;Performance in work activities;Performance in leisure activities;Limitations in community activities;Limitation in home management  -SW     Impairments  Impaired muscle length;Impaired postural alignment;Pain;Poor body mechanics;Posture  -SW     Assessment Comments  Patient is a 35 yo female presenting to clinic with c/o musculoskeletal dysfunction with pregnancy.  She presents with lumbosacral pelvic dysfunction with dec in flexiblity and core stability.  She would benefit from skilled therapy to address limitations and assist patient in maintaining ability to work along with improve QOL with daily functions.   -SW     Rehab Potential  Good  -SW     Patient/caregiver participated in establishment of treatment plan and goals  Yes  -SW     Patient would benefit from skilled therapy intervention  Yes  -SW        PT Plan    PT Frequency  1x/week  -SW     Predicted Duration of Therapy Intervention (Therapy Eval)  10 visits  -SW     PT Plan Comments  postural education,  ergonomics, manual therapy for alignment and muscle release, stabilization and strengthening.    -       User Key  (r) = Recorded By, (t) = Taken By, (c) = Cosigned By    Initials Name Provider Type    Lissette De Leon, PT DPT Physical Therapist            OP Exercises     Row Name 03/17/20 1600             Subjective Comments    Subjective Comments  Sits at work as .  Bottom part of back kills me with seated position.  Radiates into shoulder blades.  Shoulder arthritis noted.  Really not sure what the problem is.  She describes pain as being around the abdomen and along the sides as well.  Went for walk and notes that pain is present around belly and into pubic bones.  I am sure that I am not good with posture.    -SW         Subjective Pain    Able to rate subjective pain?  yes  -SW      Pre-Treatment Pain Level  4  -SW      Post-Treatment Pain Level  4  -SW         Exercise 1    Exercise Name 1  angry cat stretch   -SW         Exercise 2    Exercise Name 2  piriformis stretch   -SW      Reps 2  3  -SW      Time 2  30 sec  -SW         Exercise 3    Exercise Name 3  Hamstring stretch   -SW      Reps 3  3  -SW      Time 3  30 sec  -SW         Exercise 4    Exercise Name 4  Ergonomics briefing for work   -SW      Additional Comments  encouraged description of desk set up for proper support to posture.  Also encouraged change of position to seated or standing tasks every 30 minutes.  patient verbalized and demo'd understanding. Will revisit for compliance.   -SW         Exercise 5    Exercise Name 5  sleep postures  -SW      Additional Comments  towel roll for ligament support issued.    -        User Key  (r) = Recorded By, (t) = Taken By, (c) = Cosigned By    Initials Name Provider Type    Lissette De Leon, PT DPT Physical Therapist                      PT OP Goals     Row Name 03/17/20 1728          PT Short Term Goals    STG Date to Achieve  04/16/20  -     STG 1  patient to be  independent with HEP with daily compliance to improve flexibility and strength to assist with ADL performance.   -     STG 1 Progress  New  -     STG 2  patient to show improved alignment across pelvic girdle with supine assessment symmetry to assist with dec pain.  -     STG 2 Progress  New  -     STG 3  Patient to report improved ergonomics at work base post education from evaluation such that she can endure work for 8+ hours without inc pain or limitations.  -     STG 3 Progress  New  -[facility] 4  patient to show pelvic brace and demonstrate performance accurately to better support lifting of boxes and laundry baskets to assist with ADL and work related tasks.   -     STG 4 Progress  New  -     STG 5  patient to report pain is mild vs moderate as described with evaluation at 3/10 or less daily.   -[facility] 5 Progress  New  [facility]        Long Term Goals    LTG Date to Achieve  06/30/20  -     LTG 1  patient to report 70% improvement of s/s to LB with use of HEP and home management activities.   -     LTG 1 Progress  New  -     LTG 2  patient to present with dec spasm to L side paraspinals to LS with normalized position and dec rotation to L side.   -     LTG 2 Progress  New  -     LTG 3  patient to continue work related tasks without inc pain or need to cease work due to pain.  -     LTG 3 Progress  New  -     LTG 4  Mod Oswestry score of 26% or less indicating improved function.  -     LTG 4 Progress  New  [facility]        Time Calculation    PT Goal Re-Cert Due Date  04/16/20  -       User Key  (r) = Recorded By, (t) = Taken By, (c) = Cosigned By    Initials Name Provider Type    Lissette De Leon, PT DPT Physical Therapist          Therapy Education  Given: HEP  Program: New  How Provided: Verbal, Written, Demonstration  Provided to: Patient  Level of Understanding: Teach back education performed, Verbalized, Demonstrated     Outcome Measure Options: Jorge Trinh  Modified  Oswestry  Modified Oswestry Score/Comments: 26/50=52%      Time Calculation:   Start Time: 1623  Stop Time: 1726  Time Calculation (min): 63 min  Therapy Charges for Today     Code Description Service Date Service Provider Modifiers Qty    70974363315 HC PT EVAL MOD COMPLEXITY 3 3/17/2020 Lissette Waggoner, PT DPT GP 1    52998705296 HC PT SELF CARE/MGMT/TRAIN EA 15 MIN 3/17/2020 Lissette Waggoner, PT DPT GP 1          PT G-Codes  Outcome Measure Options: Modifed Owestry  Modified Oswestry Score/Comments: 26/50=52%       Lissette Waggoner, PT DPT  3/17/2020

## 2020-03-24 ENCOUNTER — LAB (OUTPATIENT)
Dept: LAB | Facility: HOSPITAL | Age: 35
End: 2020-03-24

## 2020-03-24 ENCOUNTER — ROUTINE PRENATAL (OUTPATIENT)
Dept: OBSTETRICS AND GYNECOLOGY | Facility: CLINIC | Age: 35
End: 2020-03-24

## 2020-03-24 ENCOUNTER — APPOINTMENT (OUTPATIENT)
Dept: PHYSICAL THERAPY | Facility: HOSPITAL | Age: 35
End: 2020-03-24

## 2020-03-24 VITALS — DIASTOLIC BLOOD PRESSURE: 60 MMHG | SYSTOLIC BLOOD PRESSURE: 102 MMHG | BODY MASS INDEX: 27.28 KG/M2 | WEIGHT: 154 LBS

## 2020-03-24 DIAGNOSIS — Z67.91 RH NEGATIVE STATUS DURING PREGNANCY IN SECOND TRIMESTER: ICD-10-CM

## 2020-03-24 DIAGNOSIS — O09.92 SUPERVISION OF HIGH RISK PREGNANCY IN SECOND TRIMESTER: ICD-10-CM

## 2020-03-24 DIAGNOSIS — Z23 NEED FOR TDAP VACCINATION: ICD-10-CM

## 2020-03-24 DIAGNOSIS — O26.892 RH NEGATIVE STATUS DURING PREGNANCY IN SECOND TRIMESTER: ICD-10-CM

## 2020-03-24 DIAGNOSIS — O21.9 NAUSEA AND VOMITING DURING PREGNANCY PRIOR TO 22 WEEKS GESTATION: ICD-10-CM

## 2020-03-24 DIAGNOSIS — O09.92 SUPERVISION OF HIGH RISK PREGNANCY IN SECOND TRIMESTER: Primary | ICD-10-CM

## 2020-03-24 DIAGNOSIS — Z3A.26 26 WEEKS GESTATION OF PREGNANCY: ICD-10-CM

## 2020-03-24 LAB
BASOPHILS # BLD AUTO: 0.02 10*3/MM3 (ref 0–0.2)
BASOPHILS NFR BLD AUTO: 0.2 % (ref 0–1.5)
DEPRECATED RDW RBC AUTO: 38.5 FL (ref 37–54)
EOSINOPHIL # BLD AUTO: 0.07 10*3/MM3 (ref 0–0.4)
EOSINOPHIL NFR BLD AUTO: 0.7 % (ref 0.3–6.2)
ERYTHROCYTE [DISTWIDTH] IN BLOOD BY AUTOMATED COUNT: 12.8 % (ref 12.3–15.4)
GLUCOSE 1H P 100 G GLC PO SERPL-MCNC: 175 MG/DL (ref 60–140)
HCT VFR BLD AUTO: 34.4 % (ref 34–46.6)
HGB BLD-MCNC: 11.6 G/DL (ref 12–15.9)
IMM GRANULOCYTES # BLD AUTO: 0.08 10*3/MM3 (ref 0–0.05)
IMM GRANULOCYTES NFR BLD AUTO: 0.8 % (ref 0–0.5)
LYMPHOCYTES # BLD AUTO: 1 10*3/MM3 (ref 0.7–3.1)
LYMPHOCYTES NFR BLD AUTO: 9.4 % (ref 19.6–45.3)
MCH RBC QN AUTO: 28 PG (ref 26.6–33)
MCHC RBC AUTO-ENTMCNC: 33.7 G/DL (ref 31.5–35.7)
MCV RBC AUTO: 83.1 FL (ref 79–97)
MONOCYTES # BLD AUTO: 0.52 10*3/MM3 (ref 0.1–0.9)
MONOCYTES NFR BLD AUTO: 4.9 % (ref 5–12)
NEUTROPHILS # BLD AUTO: 8.9 10*3/MM3 (ref 1.7–7)
NEUTROPHILS NFR BLD AUTO: 84 % (ref 42.7–76)
NRBC BLD AUTO-RTO: 0 /100 WBC (ref 0–0.2)
PLATELET # BLD AUTO: 227 10*3/MM3 (ref 140–450)
PMV BLD AUTO: 11.4 FL (ref 6–12)
RBC # BLD AUTO: 4.14 10*6/MM3 (ref 3.77–5.28)
WBC NRBC COR # BLD: 10.59 10*3/MM3 (ref 3.4–10.8)

## 2020-03-24 PROCEDURE — 90715 TDAP VACCINE 7 YRS/> IM: CPT | Performed by: OBSTETRICS & GYNECOLOGY

## 2020-03-24 PROCEDURE — 82950 GLUCOSE TEST: CPT

## 2020-03-24 PROCEDURE — 96372 THER/PROPH/DIAG INJ SC/IM: CPT | Performed by: OBSTETRICS & GYNECOLOGY

## 2020-03-24 PROCEDURE — 36415 COLL VENOUS BLD VENIPUNCTURE: CPT

## 2020-03-24 PROCEDURE — 85025 COMPLETE CBC W/AUTO DIFF WBC: CPT

## 2020-03-24 PROCEDURE — 90471 IMMUNIZATION ADMIN: CPT | Performed by: OBSTETRICS & GYNECOLOGY

## 2020-03-24 PROCEDURE — 0502F SUBSEQUENT PRENATAL CARE: CPT | Performed by: OBSTETRICS & GYNECOLOGY

## 2020-03-24 NOTE — PROGRESS NOTES
CC: Prenatal visit    Zahra Gasca is a 34 y.o.  at 26w0d.  Doing well.  No complaints.  Denies contractions, LOF, or VB.  Reports good FM.  Has occasional back pain.    /60   Wt 69.9 kg (154 lb)   LMP 2019   BMI 27.28 kg/m²   Fundal Height (cm): 28 cm  Fetal Heart Rate: 153     Problems (from 19 to present)     Problem Noted Resolved    Rh negative status during pregnancy in second trimester 2020 by Alysha Curran MD No    Overview Signed 2020  3:52 PM by Alysha Curran MD     Needs RhoGAM at 28 weeks         Supervision of high risk pregnancy in second trimester 2019 by Jennifer Shi APRN No    Overview Signed 2020  3:51 PM by Alysha Curran MD     B neg / Rubella immune / GBS unk  Dating: LMP = 1TUS (10wk)  Genetics: Declines  Tdap: 28wk  Flu: Declines  Anatomy: WNL w/ subopt views- GIRL  1h Glucola: 28wk  H&H/Plts: 28wk  Lab Results   Component Value Date    HGB 13.0 2019    HCT 38.3 2019     2019   Breast/BC undecided         Nausea and vomiting during pregnancy prior to 22 weeks gestation 2019 by Jennifer Shi APRN No    Overview Signed 2020  3:52 PM by Alysha Curran MD     Controlled on Diclegis             A/P: Zahra Gasca is a 34 y.o.  at 26w0d.  - RTC in 4 weeks  - 3T labs today  - Tdap today   - RhoGAM today  - Reviewed visitation policy  - Reviewed COVID-19 precautions     Diagnosis Plan   1. Supervision of high risk pregnancy in second trimester     2. Rh negative status during pregnancy in second trimester  Rhogam Immune Globulin Immunization   3. Nausea and vomiting during pregnancy prior to 22 weeks gestation     4. 26 weeks gestation of pregnancy     5. Need for Tdap vaccination  Tdap Vaccine Greater Than or Equal To 6yo IM     Alysha Curran MD  3/24/2020  08:32

## 2020-03-25 ENCOUNTER — TELEPHONE (OUTPATIENT)
Dept: OBSTETRICS AND GYNECOLOGY | Facility: CLINIC | Age: 35
End: 2020-03-25

## 2020-03-25 DIAGNOSIS — O99.810 ABNORMAL GLUCOSE AFFECTING PREGNANCY: Primary | ICD-10-CM

## 2020-03-25 NOTE — TELEPHONE ENCOUNTER
----- Message from Alysha Curran MD sent at 3/25/2020  7:10 AM CDT -----  Please let her know that she is not anemic but she did fail her 1h glucola.  Please have her do a fasting 3h GTT.

## 2020-03-27 ENCOUNTER — LAB (OUTPATIENT)
Dept: LAB | Facility: HOSPITAL | Age: 35
End: 2020-03-27

## 2020-03-27 ENCOUNTER — TELEPHONE (OUTPATIENT)
Dept: OBSTETRICS AND GYNECOLOGY | Facility: CLINIC | Age: 35
End: 2020-03-27

## 2020-03-27 DIAGNOSIS — O99.810 ABNORMAL GLUCOSE AFFECTING PREGNANCY: Primary | ICD-10-CM

## 2020-03-27 LAB
GLUCOSE 1H P 100 G GLC PO SERPL-MCNC: 160 MG/DL (ref 74–180)
GLUCOSE 2H P 100 G GLC PO SERPL-MCNC: 146 MG/DL (ref 74–155)
GLUCOSE 3H P 100 G GLC PO SERPL-MCNC: 152 MG/DL (ref 74–140)
GLUCOSE P FAST SERPL-MCNC: 88 MG/DL (ref 65–99)

## 2020-03-27 PROCEDURE — 82951 GLUCOSE TOLERANCE TEST (GTT): CPT

## 2020-03-27 PROCEDURE — 36415 COLL VENOUS BLD VENIPUNCTURE: CPT

## 2020-03-27 PROCEDURE — 82952 GTT-ADDED SAMPLES: CPT

## 2020-03-27 NOTE — TELEPHONE ENCOUNTER
----- Message from Alysha Curran MD sent at 3/27/2020  1:07 PM CDT -----  Please let her know that she passed her 3h GTT.

## 2020-03-30 ENCOUNTER — TELEPHONE (OUTPATIENT)
Dept: OBSTETRICS AND GYNECOLOGY | Facility: CLINIC | Age: 35
End: 2020-03-30

## 2020-03-30 NOTE — TELEPHONE ENCOUNTER
Patient called office we discussed normal lab results she verbalized understanding and has no other concerns at this time

## 2020-04-08 ENCOUNTER — DOCUMENTATION (OUTPATIENT)
Dept: OBSTETRICS AND GYNECOLOGY | Facility: CLINIC | Age: 35
End: 2020-04-08

## 2020-04-21 ENCOUNTER — ROUTINE PRENATAL (OUTPATIENT)
Dept: OBSTETRICS AND GYNECOLOGY | Facility: CLINIC | Age: 35
End: 2020-04-21

## 2020-04-21 VITALS — DIASTOLIC BLOOD PRESSURE: 64 MMHG | BODY MASS INDEX: 28.7 KG/M2 | WEIGHT: 162 LBS | SYSTOLIC BLOOD PRESSURE: 120 MMHG

## 2020-04-21 DIAGNOSIS — Z67.91 RH NEGATIVE STATUS DURING PREGNANCY IN SECOND TRIMESTER: ICD-10-CM

## 2020-04-21 DIAGNOSIS — O26.892 RH NEGATIVE STATUS DURING PREGNANCY IN SECOND TRIMESTER: ICD-10-CM

## 2020-04-21 DIAGNOSIS — O21.9 NAUSEA AND VOMITING DURING PREGNANCY PRIOR TO 22 WEEKS GESTATION: ICD-10-CM

## 2020-04-21 DIAGNOSIS — O09.92 SUPERVISION OF HIGH RISK PREGNANCY IN SECOND TRIMESTER: Primary | ICD-10-CM

## 2020-04-21 DIAGNOSIS — Z3A.30 30 WEEKS GESTATION OF PREGNANCY: ICD-10-CM

## 2020-04-21 PROCEDURE — 0502F SUBSEQUENT PRENATAL CARE: CPT | Performed by: OBSTETRICS & GYNECOLOGY

## 2020-04-21 NOTE — PROGRESS NOTES
"CC: Prenatal visit    Zahra Gasca is a 34 y.o.  at 30w0d.  Doing well.  No complaints.  Denies contractions, LOF, or VB.  Reports good FM.    /64   Wt 73.5 kg (162 lb)   LMP 2019   BMI 28.70 kg/m²   Fundal Height (cm): 29 cm  Fetal Heart Rate: 133     Problems (from 19 to present)     Problem Noted Resolved    Rh negative status during pregnancy in second trimester 2020 by Alysha Curran MD No    Overview Addendum 3/27/2020  1:08 PM by Alysha Curran MD     RhoGAM on 3/24         Supervision of high risk pregnancy in second trimester 2019 by Jennifer Shi APRN No    Overview Addendum 3/27/2020  1:08 PM by Alysha Curran MD     B neg / Rubella immune / GBS unk  Dating: LMP = 1TUS (10wk)  Genetics: Declines  Tdap: 3/24  Flu: Declines  Anatomy: WNL w/ subopt views- GIRL, \"Rylin Manjula\"  1h Glucola: 175; 3h GTT WNL  H&H/Plts:   Lab Results   Component Value Date    HGB 11.6 (L) 2020    HCT 34.4 2020     2020   Breast/BC undecided         Nausea and vomiting during pregnancy prior to 22 weeks gestation 2019 by Jennifer Shi APRN No    Overview Signed 2020  3:52 PM by Alysha Curran MD     Controlled on Diclegis             A/P: Zahra Gasca is a 34 y.o.  at 30w0d.  - RTC in 4 weeks after discussion given COVID pandemic  - Reviewed COVID-19 visitation policy  - Reviewed COVID-19 precautions     Diagnosis Plan   1. Supervision of high risk pregnancy in second trimester     2. Rh negative status during pregnancy in second trimester     3. Nausea and vomiting during pregnancy prior to 22 weeks gestation     4. 30 weeks gestation of pregnancy       Alysha Curran MD  2020  17:16  "

## 2020-05-11 ENCOUNTER — TELEPHONE (OUTPATIENT)
Dept: OBSTETRICS AND GYNECOLOGY | Facility: CLINIC | Age: 35
End: 2020-05-11

## 2020-05-11 DIAGNOSIS — O26.892 HEARTBURN DURING PREGNANCY IN SECOND TRIMESTER: ICD-10-CM

## 2020-05-11 DIAGNOSIS — R12 HEARTBURN DURING PREGNANCY IN SECOND TRIMESTER: ICD-10-CM

## 2020-05-11 RX ORDER — FAMOTIDINE 20 MG/1
20 TABLET, FILM COATED ORAL 2 TIMES DAILY
Qty: 60 TABLET | Refills: 1 | Status: SHIPPED | OUTPATIENT
Start: 2020-05-11 | End: 2020-06-15

## 2020-05-11 RX ORDER — DOXYLAMINE SUCCINATE AND PYRIDOXINE HYDROCHLORIDE, DELAYED RELEASE TABLETS 10 MG/10 MG 10; 10 MG/1; MG/1
1 TABLET, DELAYED RELEASE ORAL NIGHTLY
Qty: 60 TABLET | Refills: 2 | Status: SHIPPED | OUTPATIENT
Start: 2020-05-11 | End: 2020-05-18

## 2020-05-11 NOTE — TELEPHONE ENCOUNTER
----- Message from Dora Jung sent at 5/11/2020  9:41 AM CDT -----  Contact: [phone]  Needs a refill on Famotidine and Bonjesta. Patient uses [facility] in [place]. She states she has 3 days left. Patient also has some questions she needs answered. States she left a message last week and no one returned her call.

## 2020-05-11 NOTE — TELEPHONE ENCOUNTER
"Returned patients call she was requesting refills let patient know that I would send those in today.  Patient also had several concerns she wanted to discuss she states that when she lays down at night if she lays on her left side \"it feels weird\" states that \"its not itching or hurting but like an uneasy feeling\" I recommended that she try repositioning her self using pillows to help prop her up she was agreeable and will see if this helps. Patient stated that she has been working from home and spends lots of time on the computer but was concerned because \"sometimes while sitting there she will just zone out and have to snap her self out of it\".  I told patient this is not pregnancy related and that she could try frequent breaks and see if that helps.  She also was concerned with her left nipple she stated that \"it was harder than the right\" and wanted to make sure she would not have issues with milk supply.  I told patient that there would be no way to know that at this time but if it becomes red, warm to touch, or she develops a fever to contact the office she verbalized understanding and has no other concerns at this time.  "

## 2020-05-18 PROBLEM — O99.810 GLUCOSE INTOLERANCE OF PREGNANCY: Status: ACTIVE | Noted: 2020-05-18

## 2020-05-18 PROBLEM — O09.513 PRIMIGRAVIDA OF ADVANCED MATERNAL AGE IN THIRD TRIMESTER: Status: ACTIVE | Noted: 2020-05-18

## 2020-05-18 RX ORDER — DOXYLAMINE SUCCINATE AND PYRIDOXINE HYDROCHLORIDE 20; 20 MG/1; MG/1
TABLET, EXTENDED RELEASE ORAL
Qty: 30 TABLET | Refills: 0 | OUTPATIENT
Start: 2020-05-18

## 2020-05-18 RX ORDER — DOXYLAMINE SUCCINATE AND PYRIDOXINE HYDROCHLORIDE 20; 20 MG/1; MG/1
TABLET, EXTENDED RELEASE ORAL
Qty: 30 TABLET | Refills: 0 | Status: SHIPPED | OUTPATIENT
Start: 2020-05-18 | End: 2020-05-18 | Stop reason: SDUPTHER

## 2020-05-19 ENCOUNTER — ROUTINE PRENATAL (OUTPATIENT)
Dept: OBSTETRICS AND GYNECOLOGY | Facility: CLINIC | Age: 35
End: 2020-05-19

## 2020-05-19 VITALS — SYSTOLIC BLOOD PRESSURE: 118 MMHG | BODY MASS INDEX: 28.8 KG/M2 | WEIGHT: 162.6 LBS | DIASTOLIC BLOOD PRESSURE: 66 MMHG

## 2020-05-19 DIAGNOSIS — O09.92 SUPERVISION OF HIGH RISK PREGNANCY IN SECOND TRIMESTER: Primary | ICD-10-CM

## 2020-05-19 DIAGNOSIS — F50.89 PICA: ICD-10-CM

## 2020-05-19 DIAGNOSIS — O21.9 NAUSEA AND VOMITING DURING PREGNANCY PRIOR TO 22 WEEKS GESTATION: ICD-10-CM

## 2020-05-19 DIAGNOSIS — O09.513 PRIMIGRAVIDA OF ADVANCED MATERNAL AGE IN THIRD TRIMESTER: ICD-10-CM

## 2020-05-19 DIAGNOSIS — Z3A.34 34 WEEKS GESTATION OF PREGNANCY: ICD-10-CM

## 2020-05-19 DIAGNOSIS — Z67.91 RH NEGATIVE STATUS DURING PREGNANCY IN SECOND TRIMESTER: ICD-10-CM

## 2020-05-19 DIAGNOSIS — O26.892 RH NEGATIVE STATUS DURING PREGNANCY IN SECOND TRIMESTER: ICD-10-CM

## 2020-05-19 DIAGNOSIS — O99.810 GLUCOSE INTOLERANCE OF PREGNANCY: ICD-10-CM

## 2020-05-19 PROCEDURE — 0502F SUBSEQUENT PRENATAL CARE: CPT | Performed by: OBSTETRICS & GYNECOLOGY

## 2020-05-19 NOTE — PROGRESS NOTES
"CC: Prenatal visit    Zahra Gasca is a 34 y.o.  at 34w0d.  Doing well.  Denies contractions, LOF, or VB.  Reports good FM.  She states that she is upset because of issues with her prescriptions at the pharmacy with the cost.  She also reports itching and \"feeling weird\" along the left side of her body.  She states that she is craving plastic and wants to chew on plastic.  She wants to eat ice and is concerned about her iron.    /66   Wt 73.8 kg (162 lb 9.6 oz)   LMP 2019   BMI 28.80 kg/m²   Fundal Height (cm): 33 cm  Fetal Heart Rate: 155     Problems (from 19 to present)     Problem Noted Resolved    Glucose intolerance of pregnancy 2020 by Alysha Curran MD No    Overview Signed 2020  5:35 PM by Alysha Curran MD     3h GTT WNL         Primigravida of advanced maternal age in third trimester 2020 by Alysha Curran MD No    Overview Addendum 2020  5:35 PM by Alysha Curran MD     Will be age 35 at time of ALEXEY  Declined genetics         Rh negative status during pregnancy in second trimester 2020 by Alysha Curran MD No    Overview Addendum 3/27/2020  1:08 PM by Alysha Curran MD     RhoGAM on 3/24         Supervision of high risk pregnancy in second trimester 2019 by Jennifer Shi APRN No    Overview Addendum 3/27/2020  1:08 PM by Alysha Curran MD     B neg / Rubella immune / GBS unk  Dating: LMP = 1TUS (10wk)  Genetics: Declines  Tdap: 3/24  Flu: Declines  Anatomy: WNL w/ subopt views- GIRL, \"Rylin Manjula\"  1h Glucola: 175; 3h GTT WNL  H&H/Plts:   Lab Results   Component Value Date    HGB 11.6 (L) 2020    HCT 34.4 2020     2020   Breast/BC undecided         Nausea and vomiting during pregnancy prior to 22 weeks gestation 2019 by Jennifer Shi APRN No    Overview Signed 2020  3:52 " PM by Alysha Curran MD     Controlled on Diclegis             A/P: Zahra Gasca is a 34 y.o.  at 34w0d.  - RTC in 2 weeks  - Discussed that issues with her prescriptions and cost have to do the pharmacy and insurance company, not with our office  - May take Unisom, Tylenol PM, Benadryl  - They asked if there was a phone number where they could contact me directly.  I discussed that there is not a direct phone number.  I reviewed that they should tell their concerns to my office staff so that they can relay a message to me and then respond to them quickly as I may be in surgery, delivering a baby, in a , or in the emergency room.  They voice understanding.  - Reviewed COVID-19 visitation policy  - Reviewed COVID-19 precautions     Diagnosis Plan   1. Supervision of high risk pregnancy in second trimester     2. Glucose intolerance of pregnancy     3. Primigravida of advanced maternal age in third trimester     4. Rh negative status during pregnancy in second trimester     5. Nausea and vomiting during pregnancy prior to 22 weeks gestation     6. Pica  CBC (No Diff)    Iron Profile   7. 34 weeks gestation of pregnancy       Alysha Curran MD  2020  17:09

## 2020-05-20 ENCOUNTER — TELEPHONE (OUTPATIENT)
Dept: OBSTETRICS AND GYNECOLOGY | Facility: CLINIC | Age: 35
End: 2020-05-20

## 2020-05-20 NOTE — TELEPHONE ENCOUNTER
PATIENT IS WANTING A LETTER STATING THAT SHE CANT GO BACK TO WORK FOR A TRAINING..SHE SAYS SHE IS SO CLOSE TO HER DUE DATE AND DOESN'T WANT TO BE EXPOSED TO THE COVID-19

## 2020-05-20 NOTE — TELEPHONE ENCOUNTER
Returned patient call. Let her know that we are not able to write a letter to take her off work, but we could write a letter stating that she can take her FMLA at her discretion. Patient verbalized understanding

## 2020-06-02 ENCOUNTER — ROUTINE PRENATAL (OUTPATIENT)
Dept: OBSTETRICS AND GYNECOLOGY | Facility: CLINIC | Age: 35
End: 2020-06-02

## 2020-06-02 VITALS — DIASTOLIC BLOOD PRESSURE: 68 MMHG | SYSTOLIC BLOOD PRESSURE: 116 MMHG | WEIGHT: 173.4 LBS | BODY MASS INDEX: 30.72 KG/M2

## 2020-06-02 DIAGNOSIS — O09.513 PRIMIGRAVIDA OF ADVANCED MATERNAL AGE IN THIRD TRIMESTER: ICD-10-CM

## 2020-06-02 DIAGNOSIS — Z67.91 RH NEGATIVE STATUS DURING PREGNANCY IN THIRD TRIMESTER: ICD-10-CM

## 2020-06-02 DIAGNOSIS — O99.810 GLUCOSE INTOLERANCE OF PREGNANCY: ICD-10-CM

## 2020-06-02 DIAGNOSIS — Z3A.36 36 WEEKS GESTATION OF PREGNANCY: ICD-10-CM

## 2020-06-02 DIAGNOSIS — O09.93 SUPERVISION OF HIGH RISK PREGNANCY IN THIRD TRIMESTER: Primary | ICD-10-CM

## 2020-06-02 DIAGNOSIS — O26.893 RH NEGATIVE STATUS DURING PREGNANCY IN THIRD TRIMESTER: ICD-10-CM

## 2020-06-02 DIAGNOSIS — O21.9 NAUSEA AND VOMITING DURING PREGNANCY PRIOR TO 22 WEEKS GESTATION: ICD-10-CM

## 2020-06-02 PROCEDURE — 0502F SUBSEQUENT PRENATAL CARE: CPT | Performed by: OBSTETRICS & GYNECOLOGY

## 2020-06-02 PROCEDURE — 87653 STREP B DNA AMP PROBE: CPT | Performed by: OBSTETRICS & GYNECOLOGY

## 2020-06-02 NOTE — PROGRESS NOTES
"CC: Prenatal visit    Zahra Gasca is a 34 y.o.  at 36w0d.  Doing well.  Denies contractions, LOF, or VB.  Reports good FM.  She has had worsening swelling of her feet.  She also noticed on her Facebook Live video that her eyes rolled in the back of her head and doesn't know what caused that.  She denies any HA, vision changes, lightheadedness, or dizziness.  She has had worsening nausea that was previously controlled.    /68   Wt 78.7 kg (173 lb 6.4 oz)   LMP 2019   BMI 30.72 kg/m²   SVE: 50/-3/soft/midposition  BSUS: cephalic  Fundal Height (cm): 37 cm  Fetal Heart Rate: 138     Problems (from 19 to present)     Problem Noted Resolved    Glucose intolerance of pregnancy 2020 by Alysha Curran MD No    Overview Signed 2020  5:35 PM by Alysha Curran MD     3h GTT WNL         Primigravida of advanced maternal age in third trimester 2020 by Alysha Curran MD No    Overview Addendum 2020  5:35 PM by Alysha Curran MD     Will be age 35 at time of ALEXEY  Declined genetics         Rh negative status during pregnancy in second trimester 2020 by Alysha Curran MD No    Overview Addendum 3/27/2020  1:08 PM by Alysha Curran MD     RhoGAM on 3/24         Supervision of high risk pregnancy in second trimester 2019 by Jennifer Shi APRN No    Overview Addendum 3/27/2020  1:08 PM by Alysha Curran MD     B neg / Rubella immune / GBS unk  Dating: LMP = 1TUS (10wk)  Genetics: Declines  Tdap: 3/24  Flu: Declines  Anatomy: WNL w/ subopt views- GIRL, \"Rylin Manjula\"  1h Glucola: 175; 3h GTT WNL  H&H/Plts:   Lab Results   Component Value Date    HGB 11.6 (L) 2020    HCT 34.4 2020     2020   Breast/BC undecided         Nausea and vomiting during pregnancy prior to 22 weeks gestation 2019 by Jennifer Shi, " "APRN No    Overview Signed 2020  3:52 PM by Alysha Curran MD     Controlled on Diclegis             A/P: Zahra Gasca is a 34 y.o.  at 36w0d.  - RTC in 1 week  - Reviewed EIOL at 39wks vs IOL for late term at 41wks.  I specifically discussed that I am not on call  and if I am not on call or in an emergency, one of my partners will take care of her and deliver her baby.  She and her  will consider picking a date for an induction because she doesn't want to go too far past her due date \"but thinks it would be cool to have a  baby.\"  Encouraged her to discuss w/ her  at home and at her next appointment, we could schedule a date.  - Reviewed COVID-19 visitation policy  - Reviewed COVID-19 precautions     Diagnosis Plan   1. Supervision of high risk pregnancy in second trimester     2. Glucose intolerance of pregnancy     3. Primigravida of advanced maternal age in third trimester     4. Rh negative status during pregnancy in second trimester     5. Nausea and vomiting during pregnancy prior to 22 weeks gestation     6. 36 weeks gestation of pregnancy  Group B Strep (Molecular) - Swab, Vaginal/Rectum     Alysha Curran MD  2020  17:10  "

## 2020-06-04 PROBLEM — O99.820 GBS (GROUP B STREPTOCOCCUS CARRIER), +RV CULTURE, CURRENTLY PREGNANT: Status: ACTIVE | Noted: 2020-06-04

## 2020-06-04 LAB — GROUP B STREP, DNA: POSITIVE

## 2020-06-08 ENCOUNTER — ROUTINE PRENATAL (OUTPATIENT)
Dept: OBSTETRICS AND GYNECOLOGY | Facility: CLINIC | Age: 35
End: 2020-06-08

## 2020-06-08 VITALS — DIASTOLIC BLOOD PRESSURE: 68 MMHG | WEIGHT: 176.4 LBS | SYSTOLIC BLOOD PRESSURE: 110 MMHG | BODY MASS INDEX: 31.25 KG/M2

## 2020-06-08 DIAGNOSIS — Z67.91 RH NEGATIVE STATUS DURING PREGNANCY IN THIRD TRIMESTER: ICD-10-CM

## 2020-06-08 DIAGNOSIS — O99.820 GBS (GROUP B STREPTOCOCCUS CARRIER), +RV CULTURE, CURRENTLY PREGNANT: ICD-10-CM

## 2020-06-08 DIAGNOSIS — Z3A.37 37 WEEKS GESTATION OF PREGNANCY: ICD-10-CM

## 2020-06-08 DIAGNOSIS — O09.93 SUPERVISION OF HIGH RISK PREGNANCY IN THIRD TRIMESTER: Primary | ICD-10-CM

## 2020-06-08 DIAGNOSIS — O21.9 NAUSEA AND VOMITING DURING PREGNANCY PRIOR TO 22 WEEKS GESTATION: ICD-10-CM

## 2020-06-08 DIAGNOSIS — O26.893 RH NEGATIVE STATUS DURING PREGNANCY IN THIRD TRIMESTER: ICD-10-CM

## 2020-06-08 DIAGNOSIS — O99.810 GLUCOSE INTOLERANCE OF PREGNANCY: ICD-10-CM

## 2020-06-08 DIAGNOSIS — O09.513 PRIMIGRAVIDA OF ADVANCED MATERNAL AGE IN THIRD TRIMESTER: ICD-10-CM

## 2020-06-08 PROCEDURE — 0502F SUBSEQUENT PRENATAL CARE: CPT | Performed by: OBSTETRICS & GYNECOLOGY

## 2020-06-08 RX ORDER — SODIUM CHLORIDE, SODIUM LACTATE, POTASSIUM CHLORIDE, CALCIUM CHLORIDE 600; 310; 30; 20 MG/100ML; MG/100ML; MG/100ML; MG/100ML
125 INJECTION, SOLUTION INTRAVENOUS CONTINUOUS
Status: CANCELLED | OUTPATIENT
Start: 2020-06-08

## 2020-06-08 RX ORDER — OXYTOCIN 10 [USP'U]/ML
85 INJECTION, SOLUTION INTRAMUSCULAR; INTRAVENOUS ONCE
Status: CANCELLED | OUTPATIENT
Start: 2020-06-08

## 2020-06-08 RX ORDER — PROMETHAZINE HYDROCHLORIDE 25 MG/ML
12.5 INJECTION, SOLUTION INTRAMUSCULAR; INTRAVENOUS
Status: CANCELLED | OUTPATIENT
Start: 2020-06-08

## 2020-06-08 RX ORDER — BUTORPHANOL TARTRATE 1 MG/ML
2 INJECTION, SOLUTION INTRAMUSCULAR; INTRAVENOUS
Status: CANCELLED | OUTPATIENT
Start: 2020-06-08

## 2020-06-08 RX ORDER — CARBOPROST TROMETHAMINE 250 UG/ML
250 INJECTION, SOLUTION INTRAMUSCULAR AS NEEDED
Status: CANCELLED | OUTPATIENT
Start: 2020-06-08

## 2020-06-08 RX ORDER — LIDOCAINE HYDROCHLORIDE 10 MG/ML
5 INJECTION, SOLUTION EPIDURAL; INFILTRATION; INTRACAUDAL; PERINEURAL AS NEEDED
Status: CANCELLED | OUTPATIENT
Start: 2020-06-08

## 2020-06-08 RX ORDER — ONDANSETRON 2 MG/ML
4 INJECTION INTRAMUSCULAR; INTRAVENOUS EVERY 6 HOURS PRN
Status: CANCELLED | OUTPATIENT
Start: 2020-06-08

## 2020-06-08 RX ORDER — SODIUM CHLORIDE 0.9 % (FLUSH) 0.9 %
3-10 SYRINGE (ML) INJECTION AS NEEDED
Status: CANCELLED | OUTPATIENT
Start: 2020-06-08

## 2020-06-08 RX ORDER — MISOPROSTOL 100 UG/1
800 TABLET ORAL AS NEEDED
Status: CANCELLED | OUTPATIENT
Start: 2020-06-08

## 2020-06-08 RX ORDER — OXYTOCIN 10 [USP'U]/ML
650 INJECTION, SOLUTION INTRAMUSCULAR; INTRAVENOUS ONCE
Status: CANCELLED | OUTPATIENT
Start: 2020-06-08

## 2020-06-08 RX ORDER — MISOPROSTOL 100 UG/1
50 TABLET ORAL EVERY 6 HOURS SCHEDULED
Status: CANCELLED | OUTPATIENT
Start: 2020-06-08 | End: 2020-06-09

## 2020-06-08 RX ORDER — ONDANSETRON 4 MG/1
4 TABLET, FILM COATED ORAL EVERY 6 HOURS PRN
Status: CANCELLED | OUTPATIENT
Start: 2020-06-08

## 2020-06-08 RX ORDER — METHYLERGONOVINE MALEATE 0.2 MG/ML
200 INJECTION INTRAVENOUS ONCE AS NEEDED
Status: CANCELLED | OUTPATIENT
Start: 2020-06-08

## 2020-06-08 RX ORDER — IBUPROFEN 200 MG
800 TABLET ORAL EVERY 8 HOURS
Status: CANCELLED | OUTPATIENT
Start: 2020-06-08

## 2020-06-08 RX ORDER — PROMETHAZINE HYDROCHLORIDE 25 MG/1
12.5 TABLET ORAL EVERY 6 HOURS PRN
Status: CANCELLED | OUTPATIENT
Start: 2020-06-08

## 2020-06-08 RX ORDER — SODIUM CHLORIDE 0.9 % (FLUSH) 0.9 %
3 SYRINGE (ML) INJECTION EVERY 12 HOURS SCHEDULED
Status: CANCELLED | OUTPATIENT
Start: 2020-06-08

## 2020-06-08 RX ORDER — ACETAMINOPHEN 325 MG/1
1000 TABLET ORAL EVERY 8 HOURS
Status: CANCELLED | OUTPATIENT
Start: 2020-06-08

## 2020-06-08 RX ORDER — PROMETHAZINE HYDROCHLORIDE 25 MG/1
12.5 SUPPOSITORY RECTAL EVERY 6 HOURS PRN
Status: CANCELLED | OUTPATIENT
Start: 2020-06-08

## 2020-06-08 RX ORDER — ONDANSETRON 4 MG/1
4 TABLET, FILM COATED ORAL EVERY 8 HOURS PRN
Qty: 30 TABLET | Refills: 1 | Status: SHIPPED | OUTPATIENT
Start: 2020-06-08 | End: 2020-09-01

## 2020-06-08 NOTE — PROGRESS NOTES
"CC: Prenatal visit    Zahra Gasca is a 34 y.o.  at 36w6d.  Doing well.  Denies contractions, LOF, or VB.  Reports good FM.  She states that her right half of her face feels raw.  She has had more skin tags on her neck and wants to know if this is normal.  She is having some more nausea and some vomiting \"in her mouth.\"    /68   Wt 80 kg (176 lb 6.4 oz)   LMP 2019   BMI 31.25 kg/m²   SVE: Declined  Fundal Height (cm): 36 cm  Fetal Heart Rate: 131     Problems (from 19 to present)     Problem Noted Resolved    GBS (group B Streptococcus carrier), +RV culture, currently pregnant 2020 by Alysha Curran MD No    Overview Signed 2020  1:17 PM by Alysha Curran MD     Needs PCN in labor         Glucose intolerance of pregnancy 2020 by Alysha Curran MD No    Overview Signed 2020  5:35 PM by Alysha Curran MD     3h GTT WNL         Primigravida of advanced maternal age in third trimester 2020 by Alysha Curran MD No    Overview Addendum 2020  5:35 PM by Alysha Curran MD     Will be age 35 at time of ALEXEY  Declined genetics         Rh negative status during pregnancy in third trimester 2020 by Alysha Curran MD No    Overview Addendum 3/27/2020  1:08 PM by Alysha Curran MD     RhoGAM on 3/24         Supervision of high risk pregnancy in third trimester 2019 by Jennifer Shi APRN No    Overview Addendum 3/27/2020  1:08 PM by Alysha Curran MD     B neg / Rubella immune / GBS unk  Dating: LMP = 1TUS (10wk)  Genetics: Declines  Tdap: 3/24  Flu: Declines  Anatomy: WNL w/ subopt views- GIRL, \"Rylin Manjula\"  1h Glucola: 175; 3h GTT WNL  H&H/Plts:   Lab Results   Component Value Date    HGB 11.6 (L) 2020    HCT 34.4 2020     2020   Breast/BC undecided         Nausea and vomiting during " "pregnancy prior to 22 weeks gestation 2019 by Jennifer Shi APRN No    Overview Signed 2020  3:52 PM by Alysha Curran MD     Controlled on Diclegis             A/P: Zahra Gasca is a 34 y.o.  at 36w6d.  - RTC in 1 week  - States that \"she doesn't want to go past her due date unless she is going to have a  baby.\"  Discussed that this is contradictory.  She decided for IOL on 6/3 at 40w0d.  - Reviewed COVID-19 visitation policy  - Reviewed COVID-19 precautions     Diagnosis Plan   1. Supervision of high risk pregnancy in third trimester     2. Glucose intolerance of pregnancy     3. GBS (group B Streptococcus carrier), +RV culture, currently pregnant     4. Primigravida of advanced maternal age in third trimester     5. Rh negative status during pregnancy in third trimester     6. Nausea and vomiting during pregnancy prior to 22 weeks gestation  ondansetron (Zofran) 4 MG tablet   7. 37 weeks gestation of pregnancy       Alysha Curran MD  2020  16:41  "

## 2020-06-08 NOTE — H&P
"HISTORY & PHYSICAL - Obstetrics  [facility]    Name: Zahra Gasca  MRN: [id number]  Location: Room/bed info not found  Date: 2020  CSN: [id number]      CHIEF COMPLAINT:  \"I don't want to go past my due date unless I'm going to have a  baby\"    HISTORY OF PRESENT ILLNESS  Zahra Gasca is a 34 y.o.  at 36w6d gestational age by LMP = 1TUS suggesting Estimated Date of Delivery: 20.  The patient presents for RPN requesting IOL.  Denies LOF, vaginal bleeding, or contraction.  Reports good FM.    Patient denies any chest pain, palpitations, headaches, lightheadedness, shortness of breath, cough, nausea, vomiting, diarrhea, constipation, fever, or chills.    ROS  Review of Systems   Constitutional: Negative.    HENT: Negative.    Eyes: Negative.    Respiratory: Negative.    Cardiovascular: Negative.    Gastrointestinal: Negative.    Endocrine: Negative.    Genitourinary: Negative.    Musculoskeletal: Negative.    Skin: Negative.    Allergic/Immunologic: Negative.    Neurological: Negative.    Hematological: Negative.    Psychiatric/Behavioral: Negative.      PRENATAL LAB RESULTS  Prenatal labs reviewed  External Prenatal Results     Pregnancy Outside Results - Transcribed From Office Records - See Scanned Records For Details     Test Value Date Time    Hgb 11.6 g/dL 20 0857      11.9 g/dL 20 192      13.0 g/dL 19 1548    Hct 34.4 % 20 0857      35.3 % 20 192      38.3 % 19 1548    ABO B  19 1548    Rh Negative  19 1548    Antibody Screen Negative  19 1548    Glucose Fasting GTT       Glucose Tolerance Test 1 hour       Glucose Tolerance Test 3 hour       Gonorrhea (discrete) Negative  19 1540    Chlamydia (discrete) Negative  19 1540    RPR Non-Reactive  19 1548    VDRL       Syphilis Antibody       Rubella Positive  19 1548    HBsAg Non-Reactive  19 1548    Herpes Simplex Virus PCR    "    Herpes Simplex VIrus Culture       HIV Non-Reactive  11/26/19 1548    Hep C RNA Quant PCR       Hep C Antibody Non-Reactive  11/26/19 1548    AFP       Group B Strep Positive  06/02/20 1628    GBS Susceptibility to Clindamycin       GBS Susceptibility to Erythromycin       Fetal Fibronectin       Genetic Testing, Maternal Blood             Drug Screening     Test Value Date Time    Urine Drug Screen       Amphetamine Screen Negative  11/26/19 1540    Barbiturate Screen Negative  11/26/19 1540    Benzodiazepine Screen Negative  11/26/19 1540    Methadone Screen Negative  11/26/19 1540    Phencyclidine Screen Negative  11/26/19 1540    Opiates Screen Negative  11/26/19 1540    THC Screen Negative  11/26/19 1540    Cocaine Screen       Propoxyphene Screen Negative  11/26/19 1540    Buprenorphine Screen Negative  11/26/19 1540    Methamphetamine Screen       Oxycodone Screen Negative  11/26/19 1540    Tricyclic Antidepressants Screen Negative  11/26/19 1540              PRENATAL RISK FACTORS  11/19 Problems (from 11/26/19 to present)     Problem Noted Resolved    GBS (group B Streptococcus carrier), +RV culture, currently pregnant 6/4/2020 by Alysha Curran MD No    Overview Signed 6/4/2020  1:17 PM by Alysha Curran MD     Needs PCN in labor         Glucose intolerance of pregnancy 5/18/2020 by Alysha Curran MD No    Overview Signed 5/18/2020  5:35 PM by Alysha Curran MD     3h GTT WNL         Primigravida of advanced maternal age in third trimester 5/18/2020 by Alysha Curran MD No    Overview Addendum 5/18/2020  5:35 PM by Alysha Curran MD     Will be age 35 at time of ALEXEY  Declined genetics         Rh negative status during pregnancy in third trimester 1/28/2020 by Alysha Curran MD No    Overview Addendum 3/27/2020  1:08 PM by Alysha Curran MD     [facility]         Supervision of high  "risk pregnancy in third trimester 2019 by Jennifer Shi APRN No    Overview Addendum 3/27/2020  1:08 PM by Alysha Curran MD     B neg / Rubella immune / GBS unk  Dating: LMP = 1TUS (10wk)  Genetics: Declines  Tdap: 3/24  Flu: Declines  Anatomy: WNL w/ subopt views- GIRL, \"Rylin Manjula\"  1h Glucola: 175; 3h GTT WNL  H&H/Plts:   Lab Results   Component Value Date    HGB 11.6 (L) 2020    HCT 34.4 2020     2020   Breast/BC undecided         Nausea and vomiting during pregnancy prior to 22 weeks gestation 2019 by Jennifer Shi APRN No    Overview Signed 2020  3:52 PM by Alysha Curran MD     Controlled on Diclegis             OB HISTORY  OB History    Para Term  AB Living   1             SAB TAB Ectopic Molar Multiple Live Births                    # Outcome Date GA Lbr Suresh/2nd Weight Sex Delivery Anes PTL Lv   1 Current              GYN HISTORY  Denies h/o sexually transmitted infections/pelvic inflammatory disease  Denies h/o gynecologic surgeries, including biopsies of the cervix    PAST MEDICAL HISTORY  History reviewed. No pertinent past medical history.    PAST SURGICAL HISTORY  History reviewed. No pertinent surgical history.    FAMILY HISTORY  Family History   Problem Relation Age of Onset   • Heart disease Father    • No Known Problems Mother    • No Known Problems Brother    • Cancer Paternal Grandfather    • Heart disease Paternal Grandmother    • Heart disease Maternal Grandfather      SOCIAL HISTORY  Social History     Socioeconomic History   • Marital status:      Spouse name: Not on file   • Number of children: Not on file   • Years of education: Not on file   • Highest education level: Not on file   Tobacco Use   • Smoking status: Former Smoker   • Smokeless tobacco: Never Used   • Tobacco comment: quit 10 years ago   Substance and Sexual Activity   • Alcohol use: No     Frequency: " Never   • Drug use: No   • Sexual activity: Yes     Partners: Male     Comment: unsure about last pap smear      ALLERGIES  No Known Allergies    HOME MEDICATIONS  Prior to Admission medications    Medication Sig Start Date End Date Taking? Authorizing Provider   Doxylamine-Pyridoxine ER (Bonjesta) 20-20 MG tablet controlled-release Take 1 tablet by mouth Every Night. 20  Yes Alysha Curran MD   famotidine (Pepcid) 20 MG tablet Take 1 tablet by mouth 2 (Two) Times a Day. 20 Yes Alysha Curran MD   Prenatal Vit-Fe Fumarate-FA (PRENATAL, CLASSIC, VITAMIN) 28-0.8 MG tablet tablet Take  by mouth Daily.   Yes Provider, MD Jose Antonio   ondansetron (Zofran) 4 MG tablet Take 1 tablet by mouth Every 8 (Eight) Hours As Needed for Nausea or Vomiting. 20  Alysha Curran MD     PHYSICAL EXAM  /68   Wt 80 kg (176 lb 6.4 oz)   LMP 2019   BMI 31.25 kg/m²   General: No acute distress. Well developed, well nourished. Pleasant.  Heart: Regular rate and rhythm. No murmurs, rubs, or gallops  Lungs: Clear to auscultation bilaterally. No wheezes, rales, or rhonchi.  Abdomen: Soft, nontender to palpation, enlarged by gravid uterus.    SVE (2020): 1/ 50/ -3/ soft/ mid     Beside Ultrasound:  Presenting part: cephalic    IMPRESSION  Zahra Gasca is a 34 y.o.  at 36w6d scheduled for IOL secondary to AMA at 40w0d per patient request.  Will require cervical ripening.    PLAN  1.  IUP at 40w0d with AMA  - Admit: Labor and Delivery  - Attending: Dr. Curran  - Condition: Stable  - Vitals: per protocol  - Activity: ad kamilah  - Nursing: Continuous electronic fetal monitoring, as per protocol  - Diet: Clears  - IV fluids:  mL/hr  - Meds: SL Cytotec  - Allergies: NKDA  - Labs: CBC, T&S, UDS  - GBS: POS.  Antibiotics:  PCN  - Zahra Gasca and I have discussed pain goals for this hospitalization after reviewing her current clinical  condition, medical history and prior pain experiences.  The goal is to keep her pain level appropriate.  Patient considering an epidural  - Anticipate     This document has been electronically signed by Alysha Curran MD on 2020 16:47.

## 2020-06-15 ENCOUNTER — ROUTINE PRENATAL (OUTPATIENT)
Dept: OBSTETRICS AND GYNECOLOGY | Facility: CLINIC | Age: 35
End: 2020-06-15

## 2020-06-15 VITALS — DIASTOLIC BLOOD PRESSURE: 70 MMHG | SYSTOLIC BLOOD PRESSURE: 126 MMHG | WEIGHT: 181.6 LBS | BODY MASS INDEX: 32.17 KG/M2

## 2020-06-15 DIAGNOSIS — O09.93 SUPERVISION OF HIGH RISK PREGNANCY IN THIRD TRIMESTER: Primary | ICD-10-CM

## 2020-06-15 DIAGNOSIS — O99.810 GLUCOSE INTOLERANCE OF PREGNANCY: ICD-10-CM

## 2020-06-15 DIAGNOSIS — O09.513 PRIMIGRAVIDA OF ADVANCED MATERNAL AGE IN THIRD TRIMESTER: ICD-10-CM

## 2020-06-15 DIAGNOSIS — O21.9 NAUSEA AND VOMITING DURING PREGNANCY PRIOR TO 22 WEEKS GESTATION: ICD-10-CM

## 2020-06-15 DIAGNOSIS — Z3A.37 37 WEEKS GESTATION OF PREGNANCY: ICD-10-CM

## 2020-06-15 DIAGNOSIS — O99.820 GBS (GROUP B STREPTOCOCCUS CARRIER), +RV CULTURE, CURRENTLY PREGNANT: ICD-10-CM

## 2020-06-15 DIAGNOSIS — O26.893 RH NEGATIVE STATUS DURING PREGNANCY IN THIRD TRIMESTER: ICD-10-CM

## 2020-06-15 DIAGNOSIS — Z67.91 RH NEGATIVE STATUS DURING PREGNANCY IN THIRD TRIMESTER: ICD-10-CM

## 2020-06-15 PROCEDURE — 0502F SUBSEQUENT PRENATAL CARE: CPT | Performed by: OBSTETRICS & GYNECOLOGY

## 2020-06-15 NOTE — PROGRESS NOTES
"CC: Prenatal visit    Zahra Gasca is a 34 y.o.  at 37w6d.  Doing well.  Denies contractions, LOF, or VB.  Reports good FM.  Reports that she cannot eat hamburger meat or she will get sick.  States that her legs are very swollen and even hurts if the cat walks on them.    /70   Wt 82.4 kg (181 lb 9.6 oz)   LMP 2019   BMI 32.17 kg/m²   SVE: Declined  Fundal Height (cm): 38 cm  Fetal Heart Rate: 136     Problems (from 19 to present)     Problem Noted Resolved    GBS (group B Streptococcus carrier), +RV culture, currently pregnant 2020 by Alysha Curran MD No    Overview Signed 2020  1:17 PM by Alysha Curran MD     Needs PCN in labor         Glucose intolerance of pregnancy 2020 by Alysha Curran MD No    Overview Signed 2020  5:35 PM by Alysha Curran MD     3h GTT WNL         Primigravida of advanced maternal age in third trimester 2020 by Alysha Curran MD No    Overview Addendum 2020  5:35 PM by Alysha Curran MD     Will be age 35 at time of ALEXEY  Declined genetics         Rh negative status during pregnancy in third trimester 2020 by Alysha Curran MD No    Overview Addendum 3/27/2020  1:08 PM by Alysha Curran MD     RhoGAM on 3/24         Supervision of high risk pregnancy in third trimester 2019 by Jennifer Shi APRN No    Overview Addendum 3/27/2020  1:08 PM by Alysha Curran MD     B neg / Rubella immune / GBS unk  Dating: LMP = 1TUS (10wk)  Genetics: Declines  Tdap: 3/24  Flu: Declines  Anatomy: WNL w/ subopt views- GIRL, \"Rylin Manjula\"  1h Glucola: 175; 3h GTT WNL  H&H/Plts:   Lab Results   Component Value Date    HGB 11.6 (L) 2020    HCT 34.4 2020     2020   Breast/BC undecided         Nausea and vomiting during pregnancy prior to 22 weeks gestation 2019 " by Jennifer Shi APRN No    Overview Signed 2020  3:52 PM by Alysha Curran MD     Controlled on Diclegis             A/P: Zahra Gasca is a 34 y.o.  at 37w6d.  - RTC in 1 week  - Requests EIOL be moved to ; will move per patient request (will be 40w2d)  - Reviewed that leg edema is normal at the end of pregnancy.  Encouraged PO hydration, ambulation, propping feet, and compression socks.  - Discussed that she should avoid hamburger meat since it makes her nauseous.  Encouraged other forms of protein.  - Reviewed COVID-19 visitation policy  - Reviewed COVID-19 precautions     Diagnosis Plan   1. Supervision of high risk pregnancy in third trimester     2. GBS (group B Streptococcus carrier), +RV culture, currently pregnant     3. Glucose intolerance of pregnancy     4. Primigravida of advanced maternal age in third trimester     5. Rh negative status during pregnancy in third trimester     6. Nausea and vomiting during pregnancy prior to 22 weeks gestation     7. 37 weeks gestation of pregnancy       Alysha Curran MD  6/15/2020  18:19

## 2020-06-24 ENCOUNTER — ROUTINE PRENATAL (OUTPATIENT)
Dept: OBSTETRICS AND GYNECOLOGY | Facility: CLINIC | Age: 35
End: 2020-06-24

## 2020-06-24 VITALS — BODY MASS INDEX: 32.24 KG/M2 | DIASTOLIC BLOOD PRESSURE: 70 MMHG | WEIGHT: 182 LBS | SYSTOLIC BLOOD PRESSURE: 121 MMHG

## 2020-06-24 DIAGNOSIS — Z67.91 RH NEGATIVE STATUS DURING PREGNANCY IN THIRD TRIMESTER: ICD-10-CM

## 2020-06-24 DIAGNOSIS — Z3A.39 39 WEEKS GESTATION OF PREGNANCY: Primary | ICD-10-CM

## 2020-06-24 DIAGNOSIS — O99.810 GLUCOSE INTOLERANCE OF PREGNANCY: ICD-10-CM

## 2020-06-24 DIAGNOSIS — O09.513 PRIMIGRAVIDA OF ADVANCED MATERNAL AGE IN THIRD TRIMESTER: ICD-10-CM

## 2020-06-24 DIAGNOSIS — O99.820 GBS (GROUP B STREPTOCOCCUS CARRIER), +RV CULTURE, CURRENTLY PREGNANT: ICD-10-CM

## 2020-06-24 DIAGNOSIS — O26.893 RH NEGATIVE STATUS DURING PREGNANCY IN THIRD TRIMESTER: ICD-10-CM

## 2020-06-24 DIAGNOSIS — O21.9 NAUSEA AND VOMITING DURING PREGNANCY PRIOR TO 22 WEEKS GESTATION: ICD-10-CM

## 2020-06-24 DIAGNOSIS — O09.93 SUPERVISION OF HIGH RISK PREGNANCY IN THIRD TRIMESTER: ICD-10-CM

## 2020-06-24 PROCEDURE — 0502F SUBSEQUENT PRENATAL CARE: CPT | Performed by: OBSTETRICS & GYNECOLOGY

## 2020-06-24 NOTE — PROGRESS NOTES
"CC: Prenatal visit    Zahra Gasca is a 35 y.o.  at 39w1d.  Doing well.  Denies contractions, LOF, or VB.  Reports good FM.    /70   Wt 82.6 kg (182 lb)   LMP 2019   BMI 32.24 kg/m²   SVE: 2/50/-4  Fundal Height (cm): 39 cm  Fetal Heart Rate: 160     Problems (from 19 to present)     Problem Noted Resolved    GBS (group B Streptococcus carrier), +RV culture, currently pregnant 2020 by Alysha Curran MD No    Overview Signed 2020  1:17 PM by Alysha Curran MD     Needs PCN in labor         Glucose intolerance of pregnancy 2020 by Alysha Curran MD No    Overview Signed 2020  5:35 PM by Alysha Curran MD     3h GTT WNL         Primigravida of advanced maternal age in third trimester 2020 by Alysha Curran MD No    Overview Addendum 2020  5:35 PM by Alysha Curran MD     Will be age 35 at time of ALEXEY  Declined genetics         Rh negative status during pregnancy in third trimester 2020 by Alysha Curran MD No    Overview Addendum 3/27/2020  1:08 PM by Alysha Curran MD     RhoGAM on 3/24         Supervision of high risk pregnancy in third trimester 2019 by Jennifer Shi APRN No    Overview Addendum 3/27/2020  1:08 PM by Alysha Curran MD     B neg / Rubella immune / GBS unk  Dating: LMP = 1TUS (10wk)  Genetics: Declines  Tdap: 3/24  Flu: Declines  Anatomy: WNL w/ subopt views- GIRL, \"Rylin Manjula\"  1h Glucola: 175; 3h GTT WNL  H&H/Plts:   Lab Results   Component Value Date    HGB 11.6 (L) 2020    HCT 34.4 2020     2020   Breast/BC undecided         Nausea and vomiting during pregnancy prior to 22 weeks gestation 2019 by Jennifer Shi APRN No    Overview Signed 2020  3:52 PM by Alysha Curran MD     Controlled on Diclegis         "       A/P: Zahra Gasca is a 35 y.o.  at 39w1d.  Doing well appropriately progressing pregnancy.  Patient to follow-up in 1 week for induction on .  Fetal kick count labor precautions given.  - RTC in 1 weeks  - Reviewed COVID-19 visitation policy  - Reviewed COVID-19 precautions     Diagnosis Plan   1. 39 weeks gestation of pregnancy     2. GBS (group B Streptococcus carrier), +RV culture, currently pregnant     3. Glucose intolerance of pregnancy     4. Primigravida of advanced maternal age in third trimester     5. Rh negative status during pregnancy in third trimester     6. Nausea and vomiting during pregnancy prior to 22 weeks gestation     7. Supervision of high risk pregnancy in third trimester       Nilesh Logan DO  2020  16:48

## 2020-07-02 ENCOUNTER — ANESTHESIA EVENT (OUTPATIENT)
Dept: LABOR AND DELIVERY | Facility: HOSPITAL | Age: 35
End: 2020-07-02

## 2020-07-02 ENCOUNTER — ANESTHESIA (OUTPATIENT)
Dept: LABOR AND DELIVERY | Facility: HOSPITAL | Age: 35
End: 2020-07-02

## 2020-07-02 ENCOUNTER — APPOINTMENT (OUTPATIENT)
Dept: GENERAL RADIOLOGY | Facility: HOSPITAL | Age: 35
End: 2020-07-02

## 2020-07-02 ENCOUNTER — HOSPITAL ENCOUNTER (INPATIENT)
Facility: HOSPITAL | Age: 35
LOS: 4 days | Discharge: HOME | End: 2020-07-06
Attending: OBSTETRICS & GYNECOLOGY | Admitting: OBSTETRICS & GYNECOLOGY

## 2020-07-02 ENCOUNTER — APPOINTMENT (OUTPATIENT)
Dept: CARDIOLOGY | Facility: HOSPITAL | Age: 35
End: 2020-07-02

## 2020-07-02 ENCOUNTER — HOSPITAL ENCOUNTER (OUTPATIENT)
Dept: LABOR AND DELIVERY | Facility: HOSPITAL | Age: 35
Discharge: HOME | End: 2020-07-02

## 2020-07-02 DIAGNOSIS — O14.13 SEVERE PRE-ECLAMPSIA IN THIRD TRIMESTER: ICD-10-CM

## 2020-07-02 DIAGNOSIS — O09.513 PRIMIGRAVIDA OF ADVANCED MATERNAL AGE IN THIRD TRIMESTER: ICD-10-CM

## 2020-07-02 DIAGNOSIS — Z98.891 STATUS POST PRIMARY LOW TRANSVERSE CESAREAN SECTION: Primary | ICD-10-CM

## 2020-07-02 PROBLEM — J81.0 ACUTE PULMONARY EDEMA: Status: ACTIVE | Noted: 2020-07-02

## 2020-07-02 PROBLEM — O09.519 AMA (ADVANCED MATERNAL AGE) PRIMIGRAVIDA 35+: Status: ACTIVE | Noted: 2020-07-02

## 2020-07-02 LAB
ABO GROUP BLD: NORMAL
ALBUMIN SERPL-MCNC: 2.9 G/DL (ref 3.5–5.2)
ALBUMIN SERPL-MCNC: 3 G/DL (ref 3.5–5.2)
ALBUMIN/GLOB SERPL: 1.2 G/DL
ALBUMIN/GLOB SERPL: 1.3 G/DL
ALP SERPL-CCNC: 212 U/L (ref 39–117)
ALP SERPL-CCNC: 225 U/L (ref 39–117)
ALT SERPL W P-5'-P-CCNC: 21 U/L (ref 1–33)
ALT SERPL W P-5'-P-CCNC: 23 U/L (ref 1–33)
AMPHET+METHAMPHET UR QL: NEGATIVE
AMPHETAMINES UR QL: NEGATIVE
ANION GAP SERPL CALCULATED.3IONS-SCNC: 10 MMOL/L (ref 5–15)
ANION GAP SERPL CALCULATED.3IONS-SCNC: 12 MMOL/L (ref 5–15)
AST SERPL-CCNC: 23 U/L (ref 1–32)
AST SERPL-CCNC: 26 U/L (ref 1–32)
BARBITURATES UR QL SCN: NEGATIVE
BENZODIAZ UR QL SCN: NEGATIVE
BH CV ECHO MEAS - ACS: 1 CM
BH CV ECHO MEAS - AO MAX PG (FULL): 8.4 MMHG
BH CV ECHO MEAS - AO MAX PG: 11.4 MMHG
BH CV ECHO MEAS - AO ROOT AREA (BSA CORRECTED): 1.6
BH CV ECHO MEAS - AO ROOT AREA: 6.6 CM^2
BH CV ECHO MEAS - AO ROOT DIAM: 2.9 CM
BH CV ECHO MEAS - AO V2 MAX: 169 CM/SEC
BH CV ECHO MEAS - ASC AORTA: 2.4 CM
BH CV ECHO MEAS - AVA(V,A): 1.6 CM^2
BH CV ECHO MEAS - AVA(V,D): 1.6 CM^2
BH CV ECHO MEAS - BSA(HAYCOCK): 1.9 M^2
BH CV ECHO MEAS - BSA: 1.9 M^2
BH CV ECHO MEAS - BZI_BMI: 32.2 KILOGRAMS/M^2
BH CV ECHO MEAS - BZI_METRIC_HEIGHT: 160 CM
BH CV ECHO MEAS - BZI_METRIC_WEIGHT: 82.6 KG
BH CV ECHO MEAS - EDV(CUBED): 131.1 ML
BH CV ECHO MEAS - EDV(MOD-SP2): 85.6 ML
BH CV ECHO MEAS - EDV(MOD-SP4): 78.3 ML
BH CV ECHO MEAS - EDV(TEICH): 122.7 ML
BH CV ECHO MEAS - EF(CUBED): 57.8 %
BH CV ECHO MEAS - EF(MOD-SP2): 61.3 %
BH CV ECHO MEAS - EF(MOD-SP4): 57.6 %
BH CV ECHO MEAS - EF(TEICH): 49.2 %
BH CV ECHO MEAS - ESV(CUBED): 55.3 ML
BH CV ECHO MEAS - ESV(MOD-SP2): 33.1 ML
BH CV ECHO MEAS - ESV(MOD-SP4): 33.2 ML
BH CV ECHO MEAS - ESV(TEICH): 62.3 ML
BH CV ECHO MEAS - FS: 25 %
BH CV ECHO MEAS - IVS/LVPW: 1.1
BH CV ECHO MEAS - IVSD: 0.99 CM
BH CV ECHO MEAS - LA DIMENSION: 3.2 CM
BH CV ECHO MEAS - LA/AO: 1.1
BH CV ECHO MEAS - LV DIASTOLIC VOL/BSA (35-75): 42.1 ML/M^2
BH CV ECHO MEAS - LV MASS(C)D: 178.6 GRAMS
BH CV ECHO MEAS - LV MASS(C)DI: 96.1 GRAMS/M^2
BH CV ECHO MEAS - LV MAX PG: 3 MMHG
BH CV ECHO MEAS - LV MEAN PG: 2 MMHG
BH CV ECHO MEAS - LV SYSTOLIC VOL/BSA (12-30): 17.9 ML/M^2
BH CV ECHO MEAS - LV V1 MAX: 86.6 CM/SEC
BH CV ECHO MEAS - LV V1 MEAN: 60.8 CM/SEC
BH CV ECHO MEAS - LV V1 VTI: 19.9 CM
BH CV ECHO MEAS - LVIDD: 5.1 CM
BH CV ECHO MEAS - LVIDS: 3.8 CM
BH CV ECHO MEAS - LVLD AP2: 8.9 CM
BH CV ECHO MEAS - LVLD AP4: 8.1 CM
BH CV ECHO MEAS - LVLS AP2: 7.2 CM
BH CV ECHO MEAS - LVLS AP4: 6.9 CM
BH CV ECHO MEAS - LVOT AREA (M): 3.1 CM^2
BH CV ECHO MEAS - LVOT AREA: 3.1 CM^2
BH CV ECHO MEAS - LVOT DIAM: 2 CM
BH CV ECHO MEAS - LVPWD: 0.94 CM
BH CV ECHO MEAS - MR MAX PG: 51 MMHG
BH CV ECHO MEAS - MR MAX VEL: 357 CM/SEC
BH CV ECHO MEAS - MV A MAX VEL: 75.1 CM/SEC
BH CV ECHO MEAS - MV DEC SLOPE: 545 CM/SEC^2
BH CV ECHO MEAS - MV DEC TIME: 0.16 SEC
BH CV ECHO MEAS - MV E MAX VEL: 105 CM/SEC
BH CV ECHO MEAS - MV E/A: 1.4
BH CV ECHO MEAS - MV P1/2T MAX VEL: 110 CM/SEC
BH CV ECHO MEAS - MV P1/2T: 59.1 MSEC
BH CV ECHO MEAS - MVA P1/2T LCG: 2 CM^2
BH CV ECHO MEAS - MVA(P1/2T): 3.7 CM^2
BH CV ECHO MEAS - PA MAX PG: 5.7 MMHG
BH CV ECHO MEAS - PA V2 MAX: 119 CM/SEC
BH CV ECHO MEAS - PI END-D VEL: 91.7 CM/SEC
BH CV ECHO MEAS - RAP SYSTOLE: 5 MMHG
BH CV ECHO MEAS - RVDD: 2.4 CM
BH CV ECHO MEAS - RVSP: 45.4 MMHG
BH CV ECHO MEAS - SI(CUBED): 40.8 ML/M^2
BH CV ECHO MEAS - SI(LVOT): 33.7 ML/M^2
BH CV ECHO MEAS - SI(MOD-SP2): 28.3 ML/M^2
BH CV ECHO MEAS - SI(MOD-SP4): 24.3 ML/M^2
BH CV ECHO MEAS - SI(TEICH): 32.5 ML/M^2
BH CV ECHO MEAS - SV(CUBED): 75.8 ML
BH CV ECHO MEAS - SV(LVOT): 62.5 ML
BH CV ECHO MEAS - SV(MOD-SP2): 52.5 ML
BH CV ECHO MEAS - SV(MOD-SP4): 45.1 ML
BH CV ECHO MEAS - SV(TEICH): 60.3 ML
BH CV ECHO MEAS - TR MAX VEL: 318 CM/SEC
BILIRUB SERPL-MCNC: 0.2 MG/DL (ref 0.2–1.2)
BILIRUB SERPL-MCNC: 0.3 MG/DL (ref 0.2–1.2)
BILIRUB UR QL STRIP: ABNORMAL
BLD GP AB SCN SERPL QL: POSITIVE
BUN SERPL-MCNC: 7 MG/DL (ref 6–20)
BUN SERPL-MCNC: 8 MG/DL (ref 6–20)
BUN/CREAT SERPL: 10.1 (ref 7–25)
BUN/CREAT SERPL: 10.8 (ref 7–25)
BUPRENORPHINE SERPL-MCNC: NEGATIVE NG/ML
CALCIUM SPEC-SCNC: 8.1 MG/DL (ref 8.6–10.5)
CALCIUM SPEC-SCNC: 8.5 MG/DL (ref 8.6–10.5)
CANNABINOIDS SERPL QL: NEGATIVE
CHLORIDE SERPL-SCNC: 104 MMOL/L (ref 98–107)
CHLORIDE SERPL-SCNC: 106 MMOL/L (ref 98–107)
CLARITY UR: ABNORMAL
CO2 SERPL-SCNC: 17 MMOL/L (ref 22–29)
CO2 SERPL-SCNC: 19 MMOL/L (ref 22–29)
COCAINE UR QL: NEGATIVE
COLOR UR: ABNORMAL
CREAT SERPL-MCNC: 0.69 MG/DL (ref 0.57–1)
CREAT SERPL-MCNC: 0.74 MG/DL (ref 0.57–1)
DEPRECATED RDW RBC AUTO: 41.5 FL (ref 37–54)
DEPRECATED RDW RBC AUTO: 41.6 FL (ref 37–54)
ERYTHROCYTE [DISTWIDTH] IN BLOOD BY AUTOMATED COUNT: 15.9 % (ref 12.3–15.4)
ERYTHROCYTE [DISTWIDTH] IN BLOOD BY AUTOMATED COUNT: 15.9 % (ref 12.3–15.4)
GFR SERPL CREATININE-BSD FRML MDRD: 89 ML/MIN/1.73
GFR SERPL CREATININE-BSD FRML MDRD: 97 ML/MIN/1.73
GLOBULIN UR ELPH-MCNC: 2.4 GM/DL
GLOBULIN UR ELPH-MCNC: 2.5 GM/DL
GLUCOSE SERPL-MCNC: 110 MG/DL (ref 65–99)
GLUCOSE SERPL-MCNC: 90 MG/DL (ref 65–99)
GLUCOSE UR STRIP-MCNC: NEGATIVE MG/DL
HCT VFR BLD AUTO: 29 % (ref 34–46.6)
HCT VFR BLD AUTO: 29.9 % (ref 34–46.6)
HGB BLD-MCNC: 8.9 G/DL (ref 12–15.9)
HGB BLD-MCNC: 9.2 G/DL (ref 12–15.9)
HGB UR QL STRIP.AUTO: NEGATIVE
KETONES UR QL STRIP: ABNORMAL
LEUKOCYTE ESTERASE UR QL STRIP.AUTO: NEGATIVE
Lab: NORMAL
MAGNESIUM SERPL-MCNC: 4.1 MG/DL (ref 1.6–2.6)
MAXIMAL PREDICTED HEART RATE: 185 BPM
MCH RBC QN AUTO: 22.4 PG (ref 26.6–33)
MCH RBC QN AUTO: 22.5 PG (ref 26.6–33)
MCHC RBC AUTO-ENTMCNC: 30.7 G/DL (ref 31.5–35.7)
MCHC RBC AUTO-ENTMCNC: 30.8 G/DL (ref 31.5–35.7)
MCV RBC AUTO: 72.9 FL (ref 79–97)
MCV RBC AUTO: 73.2 FL (ref 79–97)
METHADONE UR QL SCN: NEGATIVE
NITRITE UR QL STRIP: NEGATIVE
NONSPECIFIC COLD ANTIBODY: NORMAL
NT-PROBNP SERPL-MCNC: 3681 PG/ML (ref 5–450)
OPIATES UR QL: NEGATIVE
OXYCODONE UR QL SCN: NEGATIVE
PCP UR QL SCN: NEGATIVE
PH UR STRIP.AUTO: 5.5 [PH] (ref 5–9)
PLATELET # BLD AUTO: 163 10*3/MM3 (ref 140–450)
PLATELET # BLD AUTO: 191 10*3/MM3 (ref 140–450)
PMV BLD AUTO: 11.4 FL (ref 6–12)
PMV BLD AUTO: 12.1 FL (ref 6–12)
POTASSIUM SERPL-SCNC: 3.7 MMOL/L (ref 3.5–5.2)
POTASSIUM SERPL-SCNC: 3.8 MMOL/L (ref 3.5–5.2)
PROPOXYPH UR QL: NEGATIVE
PROT SERPL-MCNC: 5.4 G/DL (ref 6–8.5)
PROT SERPL-MCNC: 5.4 G/DL (ref 6–8.5)
PROT UR QL STRIP: ABNORMAL
RBC # BLD AUTO: 3.96 10*6/MM3 (ref 3.77–5.28)
RBC # BLD AUTO: 4.1 10*6/MM3 (ref 3.77–5.28)
RH BLD: NEGATIVE
SODIUM SERPL-SCNC: 133 MMOL/L (ref 136–145)
SODIUM SERPL-SCNC: 135 MMOL/L (ref 136–145)
SP GR UR STRIP: 1.03 (ref 1–1.03)
STRESS TARGET HR: 157 BPM
T&S EXPIRATION DATE: NORMAL
TRICYCLICS UR QL SCN: NEGATIVE
UROBILINOGEN UR QL STRIP: ABNORMAL
WBC # BLD AUTO: 11.09 10*3/MM3 (ref 3.4–10.8)
WBC # BLD AUTO: 8.95 10*3/MM3 (ref 3.4–10.8)

## 2020-07-02 PROCEDURE — 25010000002 SUCCINYLCHOLINE PER 20 MG: Performed by: NURSE ANESTHETIST, CERTIFIED REGISTERED

## 2020-07-02 PROCEDURE — 25010000003 MAGNESIUM SULFATE 20 GM/500ML SOLUTION: Performed by: OBSTETRICS & GYNECOLOGY

## 2020-07-02 PROCEDURE — 25010000002 PROMETHAZINE PER 50 MG: Performed by: OBSTETRICS & GYNECOLOGY

## 2020-07-02 PROCEDURE — 71045 X-RAY EXAM CHEST 1 VIEW: CPT

## 2020-07-02 PROCEDURE — 25010000002 LORAZEPAM PER 2 MG: Performed by: OBSTETRICS & GYNECOLOGY

## 2020-07-02 PROCEDURE — 25010000002 PROPOFOL 10 MG/ML EMULSION: Performed by: NURSE ANESTHETIST, CERTIFIED REGISTERED

## 2020-07-02 PROCEDURE — 80306 DRUG TEST PRSMV INSTRMNT: CPT | Performed by: OBSTETRICS & GYNECOLOGY

## 2020-07-02 PROCEDURE — 86900 BLOOD TYPING SEROLOGIC ABO: CPT | Performed by: OBSTETRICS & GYNECOLOGY

## 2020-07-02 PROCEDURE — 25010000002 KETOROLAC TROMETHAMINE PER 15 MG: Performed by: NURSE ANESTHETIST, CERTIFIED REGISTERED

## 2020-07-02 PROCEDURE — 25010000002 FUROSEMIDE PER 20 MG: Performed by: OBSTETRICS & GYNECOLOGY

## 2020-07-02 PROCEDURE — 25010000002 ONDANSETRON PER 1 MG: Performed by: NURSE ANESTHETIST, CERTIFIED REGISTERED

## 2020-07-02 PROCEDURE — 25010000002 BUTORPHANOL PER 1 MG: Performed by: OBSTETRICS & GYNECOLOGY

## 2020-07-02 PROCEDURE — 85027 COMPLETE CBC AUTOMATED: CPT | Performed by: OBSTETRICS & GYNECOLOGY

## 2020-07-02 PROCEDURE — 86850 RBC ANTIBODY SCREEN: CPT | Performed by: OBSTETRICS & GYNECOLOGY

## 2020-07-02 PROCEDURE — 25010000002 NALOXONE PER 1 MG

## 2020-07-02 PROCEDURE — 80053 COMPREHEN METABOLIC PANEL: CPT | Performed by: OBSTETRICS & GYNECOLOGY

## 2020-07-02 PROCEDURE — 93306 TTE W/DOPPLER COMPLETE: CPT | Performed by: INTERNAL MEDICINE

## 2020-07-02 PROCEDURE — 83735 ASSAY OF MAGNESIUM: CPT | Performed by: OBSTETRICS & GYNECOLOGY

## 2020-07-02 PROCEDURE — 86901 BLOOD TYPING SEROLOGIC RH(D): CPT | Performed by: OBSTETRICS & GYNECOLOGY

## 2020-07-02 PROCEDURE — 25010000002 FENTANYL CITRATE (PF) 100 MCG/2ML SOLUTION: Performed by: NURSE ANESTHETIST, CERTIFIED REGISTERED

## 2020-07-02 PROCEDURE — 83880 ASSAY OF NATRIURETIC PEPTIDE: CPT | Performed by: OBSTETRICS & GYNECOLOGY

## 2020-07-02 PROCEDURE — 3E0P7VZ INTRODUCTION OF HORMONE INTO FEMALE REPRODUCTIVE, VIA NATURAL OR ARTIFICIAL OPENING: ICD-10-PCS | Performed by: OBSTETRICS & GYNECOLOGY

## 2020-07-02 PROCEDURE — 25010000003 PENICILLIN G POTASSIUM PER 600000 UNITS: Performed by: OBSTETRICS & GYNECOLOGY

## 2020-07-02 PROCEDURE — 25010000002 HYDROMORPHONE 1 MG/ML SOLUTION: Performed by: NURSE ANESTHETIST, CERTIFIED REGISTERED

## 2020-07-02 PROCEDURE — 93306 TTE W/DOPPLER COMPLETE: CPT

## 2020-07-02 PROCEDURE — 25010000002 HYDRALAZINE PER 20 MG: Performed by: OBSTETRICS & GYNECOLOGY

## 2020-07-02 PROCEDURE — 81003 URINALYSIS AUTO W/O SCOPE: CPT | Performed by: OBSTETRICS & GYNECOLOGY

## 2020-07-02 PROCEDURE — 86870 RBC ANTIBODY IDENTIFICATION: CPT | Performed by: OBSTETRICS & GYNECOLOGY

## 2020-07-02 PROCEDURE — 25010000002 FUROSEMIDE PER 20 MG

## 2020-07-02 PROCEDURE — 94799 UNLISTED PULMONARY SVC/PX: CPT

## 2020-07-02 PROCEDURE — 59515 CESAREAN DELIVERY: CPT | Performed by: OBSTETRICS & GYNECOLOGY

## 2020-07-02 DEVICE — SEAL HEMO SURG ARISTA/AH ABS/PWDR 3GM: Type: IMPLANTABLE DEVICE | Site: ABDOMEN | Status: FUNCTIONAL

## 2020-07-02 RX ORDER — EPHEDRINE SULFATE 50 MG/ML
INJECTION, SOLUTION INTRAVENOUS AS NEEDED
Status: DISCONTINUED | OUTPATIENT
Start: 2020-07-02 | End: 2020-07-02 | Stop reason: SURG

## 2020-07-02 RX ORDER — MISOPROSTOL 200 UG/1
800 TABLET ORAL AS NEEDED
Status: DISCONTINUED | OUTPATIENT
Start: 2020-07-02 | End: 2020-07-03 | Stop reason: HOSPADM

## 2020-07-02 RX ORDER — MAGNESIUM SULFATE HEPTAHYDRATE 40 MG/ML
2 INJECTION, SOLUTION INTRAVENOUS CONTINUOUS
Status: DISCONTINUED | OUTPATIENT
Start: 2020-07-02 | End: 2020-07-02

## 2020-07-02 RX ORDER — ONDANSETRON 4 MG/1
4 TABLET, FILM COATED ORAL EVERY 8 HOURS PRN
Status: DISCONTINUED | OUTPATIENT
Start: 2020-07-02 | End: 2020-07-06 | Stop reason: HOSPADM

## 2020-07-02 RX ORDER — OXYTOCIN/0.9 % SODIUM CHLORIDE 30/500 ML
650 PLASTIC BAG, INJECTION (ML) INTRAVENOUS ONCE
Status: COMPLETED | OUTPATIENT
Start: 2020-07-02 | End: 2020-07-02

## 2020-07-02 RX ORDER — LABETALOL HYDROCHLORIDE 5 MG/ML
40 INJECTION, SOLUTION INTRAVENOUS ONCE
Status: DISCONTINUED | OUTPATIENT
Start: 2020-07-02 | End: 2020-07-02

## 2020-07-02 RX ORDER — HYDRALAZINE HYDROCHLORIDE 20 MG/ML
10 INJECTION INTRAMUSCULAR; INTRAVENOUS ONCE
Status: COMPLETED | OUTPATIENT
Start: 2020-07-02 | End: 2020-07-02

## 2020-07-02 RX ORDER — FUROSEMIDE 10 MG/ML
INJECTION INTRAMUSCULAR; INTRAVENOUS
Status: COMPLETED
Start: 2020-07-02 | End: 2020-07-02

## 2020-07-02 RX ORDER — FUROSEMIDE 10 MG/ML
40 INJECTION INTRAMUSCULAR; INTRAVENOUS ONCE
Status: COMPLETED | OUTPATIENT
Start: 2020-07-02 | End: 2020-07-02

## 2020-07-02 RX ORDER — TRAMADOL HYDROCHLORIDE 50 MG/1
100 TABLET ORAL EVERY 6 HOURS PRN
Status: DISCONTINUED | OUTPATIENT
Start: 2020-07-03 | End: 2020-07-06 | Stop reason: HOSPADM

## 2020-07-02 RX ORDER — KETOROLAC TROMETHAMINE 30 MG/ML
INJECTION, SOLUTION INTRAMUSCULAR; INTRAVENOUS AS NEEDED
Status: DISCONTINUED | OUTPATIENT
Start: 2020-07-02 | End: 2020-07-02 | Stop reason: SURG

## 2020-07-02 RX ORDER — CALCIUM CARBONATE 200(500)MG
2 TABLET,CHEWABLE ORAL 3 TIMES DAILY PRN
Status: DISCONTINUED | OUTPATIENT
Start: 2020-07-02 | End: 2020-07-02

## 2020-07-02 RX ORDER — LABETALOL HYDROCHLORIDE 5 MG/ML
20-80 INJECTION, SOLUTION INTRAVENOUS
Status: DISCONTINUED | OUTPATIENT
Start: 2020-07-02 | End: 2020-07-02 | Stop reason: HOSPADM

## 2020-07-02 RX ORDER — OXYTOCIN/0.9 % SODIUM CHLORIDE 30/500 ML
PLASTIC BAG, INJECTION (ML) INTRAVENOUS
Status: COMPLETED
Start: 2020-07-02 | End: 2020-07-02

## 2020-07-02 RX ORDER — PROMETHAZINE HYDROCHLORIDE 12.5 MG/1
12.5 SUPPOSITORY RECTAL EVERY 6 HOURS PRN
Status: DISCONTINUED | OUTPATIENT
Start: 2020-07-02 | End: 2020-07-02

## 2020-07-02 RX ORDER — KETOROLAC TROMETHAMINE 30 MG/ML
30 INJECTION, SOLUTION INTRAMUSCULAR; INTRAVENOUS EVERY 6 HOURS
Status: COMPLETED | OUTPATIENT
Start: 2020-07-03 | End: 2020-07-03

## 2020-07-02 RX ORDER — METHYLERGONOVINE MALEATE 0.2 MG/ML
200 INJECTION INTRAVENOUS ONCE AS NEEDED
Status: DISCONTINUED | OUTPATIENT
Start: 2020-07-02 | End: 2020-07-03 | Stop reason: HOSPADM

## 2020-07-02 RX ORDER — LORAZEPAM 2 MG/ML
1 INJECTION INTRAMUSCULAR ONCE
Status: COMPLETED | OUTPATIENT
Start: 2020-07-02 | End: 2020-07-02

## 2020-07-02 RX ORDER — DIPHENHYDRAMINE HYDROCHLORIDE 50 MG/ML
25 INJECTION INTRAMUSCULAR; INTRAVENOUS EVERY 4 HOURS PRN
Status: DISCONTINUED | OUTPATIENT
Start: 2020-07-02 | End: 2020-07-06 | Stop reason: HOSPADM

## 2020-07-02 RX ORDER — NALOXONE HYDROCHLORIDE 0.4 MG/ML
INJECTION, SOLUTION INTRAMUSCULAR; INTRAVENOUS; SUBCUTANEOUS
Status: COMPLETED
Start: 2020-07-02 | End: 2020-07-02

## 2020-07-02 RX ORDER — LEVETIRACETAM 500 MG/1
500 TABLET ORAL EVERY 12 HOURS SCHEDULED
Status: DISCONTINUED | OUTPATIENT
Start: 2020-07-02 | End: 2020-07-02 | Stop reason: HOSPADM

## 2020-07-02 RX ORDER — NALOXONE HYDROCHLORIDE 0.4 MG/ML
0.2 INJECTION, SOLUTION INTRAMUSCULAR; INTRAVENOUS; SUBCUTANEOUS ONCE
Status: COMPLETED | OUTPATIENT
Start: 2020-07-02 | End: 2020-07-02

## 2020-07-02 RX ORDER — LIDOCAINE HYDROCHLORIDE 10 MG/ML
5 INJECTION, SOLUTION EPIDURAL; INFILTRATION; INTRACAUDAL; PERINEURAL AS NEEDED
Status: DISCONTINUED | OUTPATIENT
Start: 2020-07-02 | End: 2020-07-02

## 2020-07-02 RX ORDER — SODIUM CHLORIDE, SODIUM LACTATE, POTASSIUM CHLORIDE, CALCIUM CHLORIDE 600; 310; 30; 20 MG/100ML; MG/100ML; MG/100ML; MG/100ML
INJECTION, SOLUTION INTRAVENOUS
Status: COMPLETED
Start: 2020-07-02 | End: 2020-07-02

## 2020-07-02 RX ORDER — ALUMINA, MAGNESIA, AND SIMETHICONE 2400; 2400; 240 MG/30ML; MG/30ML; MG/30ML
15 SUSPENSION ORAL EVERY 4 HOURS PRN
Status: DISCONTINUED | OUTPATIENT
Start: 2020-07-02 | End: 2020-07-06 | Stop reason: HOSPADM

## 2020-07-02 RX ORDER — ACETAMINOPHEN 500 MG
1000 TABLET ORAL EVERY 8 HOURS
Status: DISCONTINUED | OUTPATIENT
Start: 2020-07-02 | End: 2020-07-03 | Stop reason: HOSPADM

## 2020-07-02 RX ORDER — OXYTOCIN/0.9 % SODIUM CHLORIDE 30/500 ML
PLASTIC BAG, INJECTION (ML) INTRAVENOUS AS NEEDED
Status: DISCONTINUED | OUTPATIENT
Start: 2020-07-02 | End: 2020-07-02 | Stop reason: SURG

## 2020-07-02 RX ORDER — ONDANSETRON 2 MG/ML
4 INJECTION INTRAMUSCULAR; INTRAVENOUS ONCE AS NEEDED
Status: ACTIVE | OUTPATIENT
Start: 2020-07-02 | End: 2020-07-03

## 2020-07-02 RX ORDER — CALCIUM CARBONATE 200(500)MG
2 TABLET,CHEWABLE ORAL EVERY 6 HOURS PRN
Status: DISCONTINUED | OUTPATIENT
Start: 2020-07-02 | End: 2020-07-06 | Stop reason: HOSPADM

## 2020-07-02 RX ORDER — GABAPENTIN 100 MG/1
100 CAPSULE ORAL 3 TIMES DAILY
Status: DISCONTINUED | OUTPATIENT
Start: 2020-07-02 | End: 2020-07-06 | Stop reason: HOSPADM

## 2020-07-02 RX ORDER — DIPHENHYDRAMINE HCL 25 MG
25 CAPSULE ORAL EVERY 4 HOURS PRN
Status: DISCONTINUED | OUTPATIENT
Start: 2020-07-02 | End: 2020-07-02 | Stop reason: SDUPTHER

## 2020-07-02 RX ORDER — HYDROCORTISONE 25 MG/G
CREAM TOPICAL 3 TIMES DAILY PRN
Status: DISCONTINUED | OUTPATIENT
Start: 2020-07-02 | End: 2020-07-06 | Stop reason: HOSPADM

## 2020-07-02 RX ORDER — FAMOTIDINE 10 MG/ML
20 INJECTION, SOLUTION INTRAVENOUS 2 TIMES DAILY
Status: DISCONTINUED | OUTPATIENT
Start: 2020-07-02 | End: 2020-07-02 | Stop reason: HOSPADM

## 2020-07-02 RX ORDER — TRAMADOL HYDROCHLORIDE 50 MG/1
50 TABLET ORAL EVERY 6 HOURS PRN
Status: DISCONTINUED | OUTPATIENT
Start: 2020-07-02 | End: 2020-07-06 | Stop reason: HOSPADM

## 2020-07-02 RX ORDER — SIMETHICONE 80 MG
80 TABLET,CHEWABLE ORAL 4 TIMES DAILY
Status: DISCONTINUED | OUTPATIENT
Start: 2020-07-02 | End: 2020-07-06 | Stop reason: HOSPADM

## 2020-07-02 RX ORDER — PROPOFOL 10 MG/ML
VIAL (ML) INTRAVENOUS AS NEEDED
Status: DISCONTINUED | OUTPATIENT
Start: 2020-07-02 | End: 2020-07-02 | Stop reason: SURG

## 2020-07-02 RX ORDER — NALOXONE HCL 0.4 MG/ML
0.1 VIAL (ML) INJECTION
Status: DISCONTINUED | OUTPATIENT
Start: 2020-07-02 | End: 2020-07-02

## 2020-07-02 RX ORDER — DIPHENHYDRAMINE HCL 25 MG
25 CAPSULE ORAL EVERY 4 HOURS PRN
Status: DISCONTINUED | OUTPATIENT
Start: 2020-07-02 | End: 2020-07-06 | Stop reason: HOSPADM

## 2020-07-02 RX ORDER — NALOXONE HCL 0.4 MG/ML
0.2 VIAL (ML) INJECTION
Status: DISCONTINUED | OUTPATIENT
Start: 2020-07-02 | End: 2020-07-06 | Stop reason: HOSPADM

## 2020-07-02 RX ORDER — PROMETHAZINE HYDROCHLORIDE 12.5 MG/1
12.5 TABLET ORAL EVERY 6 HOURS PRN
Status: DISCONTINUED | OUTPATIENT
Start: 2020-07-02 | End: 2020-07-02

## 2020-07-02 RX ORDER — NIFEDIPINE 30 MG/1
30 TABLET, EXTENDED RELEASE ORAL
Status: DISCONTINUED | OUTPATIENT
Start: 2020-07-02 | End: 2020-07-02 | Stop reason: HOSPADM

## 2020-07-02 RX ORDER — SODIUM CHLORIDE, SODIUM LACTATE, POTASSIUM CHLORIDE, CALCIUM CHLORIDE 600; 310; 30; 20 MG/100ML; MG/100ML; MG/100ML; MG/100ML
50 INJECTION, SOLUTION INTRAVENOUS CONTINUOUS
Status: DISCONTINUED | OUTPATIENT
Start: 2020-07-02 | End: 2020-07-06 | Stop reason: HOSPADM

## 2020-07-02 RX ORDER — IBUPROFEN 800 MG/1
800 TABLET ORAL EVERY 8 HOURS
Status: DISCONTINUED | OUTPATIENT
Start: 2020-07-02 | End: 2020-07-02 | Stop reason: SDUPTHER

## 2020-07-02 RX ORDER — MISOPROSTOL 100 MCG
50 TABLET ORAL EVERY 6 HOURS SCHEDULED
Status: DISCONTINUED | OUTPATIENT
Start: 2020-07-02 | End: 2020-07-02

## 2020-07-02 RX ORDER — ONDANSETRON 2 MG/ML
4 INJECTION INTRAMUSCULAR; INTRAVENOUS EVERY 6 HOURS PRN
Status: DISCONTINUED | OUTPATIENT
Start: 2020-07-02 | End: 2020-07-06 | Stop reason: HOSPADM

## 2020-07-02 RX ORDER — SODIUM CHLORIDE, SODIUM LACTATE, POTASSIUM CHLORIDE, CALCIUM CHLORIDE 600; 310; 30; 20 MG/100ML; MG/100ML; MG/100ML; MG/100ML
125 INJECTION, SOLUTION INTRAVENOUS CONTINUOUS
Status: DISCONTINUED | OUTPATIENT
Start: 2020-07-02 | End: 2020-07-02

## 2020-07-02 RX ORDER — OXYTOCIN/0.9 % SODIUM CHLORIDE 30/500 ML
325 PLASTIC BAG, INJECTION (ML) INTRAVENOUS ONCE
Status: DISCONTINUED | OUTPATIENT
Start: 2020-07-02 | End: 2020-07-03

## 2020-07-02 RX ORDER — SUCCINYLCHOLINE CHLORIDE 20 MG/ML
INJECTION INTRAMUSCULAR; INTRAVENOUS AS NEEDED
Status: DISCONTINUED | OUTPATIENT
Start: 2020-07-02 | End: 2020-07-02 | Stop reason: SURG

## 2020-07-02 RX ORDER — LANOLIN 100 %
OINTMENT (GRAM) TOPICAL
Status: DISCONTINUED | OUTPATIENT
Start: 2020-07-02 | End: 2020-07-06 | Stop reason: HOSPADM

## 2020-07-02 RX ORDER — ONDANSETRON 4 MG/1
4 TABLET, FILM COATED ORAL EVERY 6 HOURS PRN
Status: DISCONTINUED | OUTPATIENT
Start: 2020-07-02 | End: 2020-07-02

## 2020-07-02 RX ORDER — KETOROLAC TROMETHAMINE 30 MG/ML
30 INJECTION, SOLUTION INTRAMUSCULAR; INTRAVENOUS EVERY 6 HOURS
Status: ACTIVE | OUTPATIENT
Start: 2020-07-02 | End: 2020-07-02

## 2020-07-02 RX ORDER — PROMETHAZINE HYDROCHLORIDE 25 MG/ML
12.5 INJECTION, SOLUTION INTRAMUSCULAR; INTRAVENOUS EVERY 4 HOURS PRN
Status: DISCONTINUED | OUTPATIENT
Start: 2020-07-02 | End: 2020-07-02

## 2020-07-02 RX ORDER — IBUPROFEN 800 MG/1
800 TABLET ORAL EVERY 8 HOURS SCHEDULED
Status: DISCONTINUED | OUTPATIENT
Start: 2020-07-05 | End: 2020-07-06 | Stop reason: HOSPADM

## 2020-07-02 RX ORDER — FENTANYL CITRATE 50 UG/ML
INJECTION, SOLUTION INTRAMUSCULAR; INTRAVENOUS AS NEEDED
Status: DISCONTINUED | OUTPATIENT
Start: 2020-07-02 | End: 2020-07-02 | Stop reason: SURG

## 2020-07-02 RX ORDER — ONDANSETRON 2 MG/ML
INJECTION INTRAMUSCULAR; INTRAVENOUS AS NEEDED
Status: DISCONTINUED | OUTPATIENT
Start: 2020-07-02 | End: 2020-07-02 | Stop reason: SURG

## 2020-07-02 RX ORDER — DIPHENHYDRAMINE HYDROCHLORIDE 50 MG/ML
25 INJECTION INTRAMUSCULAR; INTRAVENOUS EVERY 4 HOURS PRN
Status: DISCONTINUED | OUTPATIENT
Start: 2020-07-02 | End: 2020-07-02 | Stop reason: SDUPTHER

## 2020-07-02 RX ORDER — ONDANSETRON 2 MG/ML
4 INJECTION INTRAMUSCULAR; INTRAVENOUS EVERY 6 HOURS PRN
Status: DISCONTINUED | OUTPATIENT
Start: 2020-07-02 | End: 2020-07-02

## 2020-07-02 RX ORDER — SODIUM CHLORIDE 0.9 % (FLUSH) 0.9 %
3 SYRINGE (ML) INJECTION EVERY 12 HOURS SCHEDULED
Status: DISCONTINUED | OUTPATIENT
Start: 2020-07-02 | End: 2020-07-02

## 2020-07-02 RX ORDER — PRENATAL VIT/IRON FUM/FOLIC AC 27MG-0.8MG
1 TABLET ORAL DAILY
Status: DISCONTINUED | OUTPATIENT
Start: 2020-07-03 | End: 2020-07-06 | Stop reason: HOSPADM

## 2020-07-02 RX ORDER — CARBOPROST TROMETHAMINE 250 UG/ML
250 INJECTION, SOLUTION INTRAMUSCULAR AS NEEDED
Status: DISCONTINUED | OUTPATIENT
Start: 2020-07-02 | End: 2020-07-03 | Stop reason: HOSPADM

## 2020-07-02 RX ORDER — HYDROMORPHONE HCL IN 0.9% NACL 0.2 MG/ML
PLASTIC BAG, INJECTION (ML) INTRAVENOUS CONTINUOUS
Status: DISCONTINUED | OUTPATIENT
Start: 2020-07-02 | End: 2020-07-02

## 2020-07-02 RX ORDER — BUPIVACAINE HCL/0.9 % NACL/PF 0.1 %
2 PLASTIC BAG, INJECTION (ML) EPIDURAL ONCE
Status: COMPLETED | OUTPATIENT
Start: 2020-07-02 | End: 2020-07-02

## 2020-07-02 RX ORDER — OXYTOCIN/0.9 % SODIUM CHLORIDE 30/500 ML
85 PLASTIC BAG, INJECTION (ML) INTRAVENOUS
Status: DISCONTINUED | OUTPATIENT
Start: 2020-07-02 | End: 2020-07-03

## 2020-07-02 RX ORDER — OXYTOCIN/0.9 % SODIUM CHLORIDE 30/500 ML
85 PLASTIC BAG, INJECTION (ML) INTRAVENOUS ONCE
Status: COMPLETED | OUTPATIENT
Start: 2020-07-02 | End: 2020-07-02

## 2020-07-02 RX ORDER — DIPHENHYDRAMINE HYDROCHLORIDE 50 MG/ML
25 INJECTION INTRAMUSCULAR; INTRAVENOUS EVERY 6 HOURS PRN
Status: DISCONTINUED | OUTPATIENT
Start: 2020-07-02 | End: 2020-07-02

## 2020-07-02 RX ORDER — IBUPROFEN 800 MG/1
800 TABLET ORAL EVERY 8 HOURS SCHEDULED
Status: DISCONTINUED | OUTPATIENT
Start: 2020-07-03 | End: 2020-07-02

## 2020-07-02 RX ORDER — PROMETHAZINE HYDROCHLORIDE 25 MG/ML
12.5 INJECTION, SOLUTION INTRAMUSCULAR; INTRAVENOUS
Status: DISCONTINUED | OUTPATIENT
Start: 2020-07-02 | End: 2020-07-02

## 2020-07-02 RX ORDER — FUROSEMIDE 10 MG/ML
60 INJECTION INTRAMUSCULAR; INTRAVENOUS ONCE
Status: COMPLETED | OUTPATIENT
Start: 2020-07-02 | End: 2020-07-02

## 2020-07-02 RX ORDER — SODIUM CHLORIDE 0.9 % (FLUSH) 0.9 %
3-10 SYRINGE (ML) INJECTION AS NEEDED
Status: DISCONTINUED | OUTPATIENT
Start: 2020-07-02 | End: 2020-07-02

## 2020-07-02 RX ADMIN — OXYTOCIN-SODIUM CHLORIDE 0.9% IV SOLN 30 UNIT/500ML 50 ML: 30-0.9/5 SOLUTION at 19:48

## 2020-07-02 RX ADMIN — NALOXONE HYDROCHLORIDE 0.2 MG: 0.4 INJECTION, SOLUTION INTRAMUSCULAR; INTRAVENOUS; SUBCUTANEOUS at 16:13

## 2020-07-02 RX ADMIN — Medication 2 G: at 19:20

## 2020-07-02 RX ADMIN — OXYTOCIN-SODIUM CHLORIDE 0.9% IV SOLN 30 UNIT/500ML 50 ML: 30-0.9/5 SOLUTION at 20:12

## 2020-07-02 RX ADMIN — SODIUM CHLORIDE 3 MILLION UNITS: 9 INJECTION, SOLUTION INTRAVENOUS at 10:44

## 2020-07-02 RX ADMIN — EPHEDRINE SULFATE 10 MG: 50 INJECTION INTRAVENOUS at 19:47

## 2020-07-02 RX ADMIN — LORAZEPAM 1 MG: 2 INJECTION INTRAMUSCULAR; INTRAVENOUS at 08:09

## 2020-07-02 RX ADMIN — KETOROLAC TROMETHAMINE 30 MG: 30 INJECTION, SOLUTION INTRAMUSCULAR at 20:09

## 2020-07-02 RX ADMIN — LEVETIRACETAM 500 MG: 500 TABLET ORAL at 17:40

## 2020-07-02 RX ADMIN — SODIUM CHLORIDE, POTASSIUM CHLORIDE, SODIUM LACTATE AND CALCIUM CHLORIDE 125 ML/HR: 600; 310; 30; 20 INJECTION, SOLUTION INTRAVENOUS at 06:14

## 2020-07-02 RX ADMIN — FUROSEMIDE 60 MG: 10 INJECTION, SOLUTION INTRAMUSCULAR; INTRAVENOUS at 21:51

## 2020-07-02 RX ADMIN — CARBOPROST TROMETHAMINE 250 MCG: 250 INJECTION, SOLUTION INTRAMUSCULAR at 20:00

## 2020-07-02 RX ADMIN — HYDROMORPHONE HYDROCHLORIDE 0.5 MG: 1 INJECTION, SOLUTION INTRAMUSCULAR; INTRAVENOUS; SUBCUTANEOUS at 20:16

## 2020-07-02 RX ADMIN — OXYTOCIN-SODIUM CHLORIDE 0.9% IV SOLN 30 UNIT/500ML 50 ML: 30-0.9/5 SOLUTION at 19:39

## 2020-07-02 RX ADMIN — BUTORPHANOL TARTRATE 2 MG: 2 INJECTION, SOLUTION INTRAMUSCULAR; INTRAVENOUS at 15:48

## 2020-07-02 RX ADMIN — SODIUM CHLORIDE, POTASSIUM CHLORIDE, SODIUM LACTATE AND CALCIUM CHLORIDE 125 ML/HR: 600; 310; 30; 20 INJECTION, SOLUTION INTRAVENOUS at 14:24

## 2020-07-02 RX ADMIN — NIFEDIPINE 30 MG: 30 TABLET, FILM COATED, EXTENDED RELEASE ORAL at 10:46

## 2020-07-02 RX ADMIN — OXYTOCIN-SODIUM CHLORIDE 0.9% IV SOLN 30 UNIT/500ML 85 ML/HR: 30-0.9/5 SOLUTION at 21:02

## 2020-07-02 RX ADMIN — FUROSEMIDE 40 MG: 10 INJECTION, SOLUTION INTRAMUSCULAR; INTRAVENOUS at 16:53

## 2020-07-02 RX ADMIN — OXYTOCIN-SODIUM CHLORIDE 0.9% IV SOLN 30 UNIT/500ML 50 ML: 30-0.9/5 SOLUTION at 19:43

## 2020-07-02 RX ADMIN — FENTANYL CITRATE 25 MCG: 50 INJECTION, SOLUTION INTRAMUSCULAR; INTRAVENOUS at 19:41

## 2020-07-02 RX ADMIN — SODIUM CHLORIDE, POTASSIUM CHLORIDE, SODIUM LACTATE AND CALCIUM CHLORIDE: 600; 310; 30; 20 INJECTION, SOLUTION INTRAVENOUS at 20:17

## 2020-07-02 RX ADMIN — FUROSEMIDE 40 MG: 10 INJECTION INTRAMUSCULAR; INTRAVENOUS at 16:53

## 2020-07-02 RX ADMIN — SODIUM CHLORIDE 5 MILLION UNITS: 9 INJECTION, SOLUTION INTRAVENOUS at 06:39

## 2020-07-02 RX ADMIN — NALOXONE HYDROCHLORIDE 0.2 MG: 0.4 INJECTION, SOLUTION INTRAMUSCULAR; INTRAVENOUS; SUBCUTANEOUS at 17:31

## 2020-07-02 RX ADMIN — MAGNESIUM SULFATE HEPTAHYDRATE 2 G/HR: 40 INJECTION, SOLUTION INTRAVENOUS at 11:30

## 2020-07-02 RX ADMIN — SODIUM CHLORIDE 3 MILLION UNITS: 9 INJECTION, SOLUTION INTRAVENOUS at 14:24

## 2020-07-02 RX ADMIN — OXYTOCIN-SODIUM CHLORIDE 0.9% IV SOLN 30 UNIT/500ML 50 ML: 30-0.9/5 SOLUTION at 20:03

## 2020-07-02 RX ADMIN — FENTANYL CITRATE 50 MCG: 50 INJECTION, SOLUTION INTRAMUSCULAR; INTRAVENOUS at 19:39

## 2020-07-02 RX ADMIN — LABETALOL HYDROCHLORIDE 80 MG: 5 INJECTION INTRAVENOUS at 09:27

## 2020-07-02 RX ADMIN — PROPOFOL 150 MG: 10 INJECTION, EMULSION INTRAVENOUS at 19:30

## 2020-07-02 RX ADMIN — LABETALOL HYDROCHLORIDE 20 MG: 5 INJECTION INTRAVENOUS at 08:40

## 2020-07-02 RX ADMIN — MISOPROSTOL 50 MCG: 100 TABLET ORAL at 06:46

## 2020-07-02 RX ADMIN — LABETALOL HYDROCHLORIDE 40 MG: 5 INJECTION INTRAVENOUS at 08:59

## 2020-07-02 RX ADMIN — SUCCINYLCHOLINE CHLORIDE 120 MG: 20 INJECTION, SOLUTION INTRAMUSCULAR; INTRAVENOUS at 19:30

## 2020-07-02 RX ADMIN — ONDANSETRON HYDROCHLORIDE 8 MG: 2 INJECTION INTRAMUSCULAR; INTRAVENOUS at 19:42

## 2020-07-02 RX ADMIN — OXYTOCIN-SODIUM CHLORIDE 0.9% IV SOLN 30 UNIT/500ML 50 ML: 30-0.9/5 SOLUTION at 19:57

## 2020-07-02 RX ADMIN — HYDRALAZINE HYDROCHLORIDE 10 MG: 20 INJECTION INTRAMUSCULAR; INTRAVENOUS at 14:19

## 2020-07-02 RX ADMIN — MISOPROSTOL 50 MCG: 100 TABLET ORAL at 12:57

## 2020-07-02 RX ADMIN — PROMETHAZINE HYDROCHLORIDE 12.5 MG: 25 INJECTION INTRAMUSCULAR; INTRAVENOUS at 15:37

## 2020-07-02 RX ADMIN — OXYTOCIN-SODIUM CHLORIDE 0.9% IV SOLN 30 UNIT/500ML 50 ML: 30-0.9/5 SOLUTION at 19:35

## 2020-07-02 RX ADMIN — SIMETHICONE CHEW TAB 80 MG 80 MG: 80 TABLET ORAL at 21:50

## 2020-07-02 RX ADMIN — OXYTOCIN-SODIUM CHLORIDE 0.9% IV SOLN 30 UNIT/500ML 650 ML/HR: 30-0.9/5 SOLUTION at 21:01

## 2020-07-02 RX ADMIN — ACETAMINOPHEN 1000 MG: 500 TABLET ORAL at 21:50

## 2020-07-02 RX ADMIN — GABAPENTIN 100 MG: 100 CAPSULE ORAL at 21:50

## 2020-07-02 RX ADMIN — FENTANYL CITRATE 25 MCG: 50 INJECTION, SOLUTION INTRAMUSCULAR; INTRAVENOUS at 19:43

## 2020-07-02 NOTE — ANESTHESIA PREPROCEDURE EVALUATION
Anesthesia Evaluation     Patient summary reviewed and Nursing notes reviewed   NPO Solid Status: > 2 hours  NPO Liquid Status: > 8 hours           Airway   Mallampati: II  TM distance: >3 FB  Neck ROM: full  No difficulty expected  Dental - normal exam     Pulmonary    (+) a smoker Former, rhonchi,     ROS comment: pulmonary edema on X ray  Cardiovascular - normal exam    (+) hypertension,     ROS comment: ECHO ordered per Dr. Curran prior to C section.   Mild cardiomyopathy noted on X Ray    Neuro/Psych- negative ROS  GI/Hepatic/Renal/Endo      Musculoskeletal     Abdominal  - normal exam   Substance History - negative use     OB/GYN    (+) Pregnant, Preeclampsia (Severe preeclampsia, third trimester), pregnancy induced hypertension        Other - negative ROS                       Anesthesia Plan    ASA 3 - emergent     general     intravenous induction     Anesthetic plan, all risks, benefits, and alternatives have been provided, discussed and informed consent has been obtained with: patient and spouse/significant other.

## 2020-07-03 LAB
ALBUMIN SERPL-MCNC: 2.7 G/DL (ref 3.5–5.2)
ALBUMIN/GLOB SERPL: 1.2 G/DL
ALP SERPL-CCNC: 185 U/L (ref 39–117)
ALT SERPL W P-5'-P-CCNC: 20 U/L (ref 1–33)
ANION GAP SERPL CALCULATED.3IONS-SCNC: 12 MMOL/L (ref 5–15)
AST SERPL-CCNC: 36 U/L (ref 1–32)
BILIRUB SERPL-MCNC: 0.3 MG/DL (ref 0.2–1.2)
BUN SERPL-MCNC: 7 MG/DL (ref 6–20)
BUN/CREAT SERPL: 8.8 (ref 7–25)
CALCIUM SPEC-SCNC: 8 MG/DL (ref 8.6–10.5)
CHLORIDE SERPL-SCNC: 103 MMOL/L (ref 98–107)
CO2 SERPL-SCNC: 20 MMOL/L (ref 22–29)
CREAT SERPL-MCNC: 0.8 MG/DL (ref 0.57–1)
DEPRECATED RDW RBC AUTO: 41.7 FL (ref 37–54)
ERYTHROCYTE [DISTWIDTH] IN BLOOD BY AUTOMATED COUNT: 16.4 % (ref 12.3–15.4)
FETAL BLEED: NEGATIVE
GFR SERPL CREATININE-BSD FRML MDRD: 82 ML/MIN/1.73
GLOBULIN UR ELPH-MCNC: 2.2 GM/DL
GLUCOSE SERPL-MCNC: 110 MG/DL (ref 65–99)
HCT VFR BLD AUTO: 25.4 % (ref 34–46.6)
HGB BLD-MCNC: 7.9 G/DL (ref 12–15.9)
MCH RBC QN AUTO: 22.5 PG (ref 26.6–33)
MCHC RBC AUTO-ENTMCNC: 31.1 G/DL (ref 31.5–35.7)
MCV RBC AUTO: 72.4 FL (ref 79–97)
NUMBER OF DOSES: NORMAL
PLATELET # BLD AUTO: 187 10*3/MM3 (ref 140–450)
PMV BLD AUTO: 12.2 FL (ref 6–12)
POTASSIUM SERPL-SCNC: 4.1 MMOL/L (ref 3.5–5.2)
PROT SERPL-MCNC: 4.9 G/DL (ref 6–8.5)
RBC # BLD AUTO: 3.51 10*6/MM3 (ref 3.77–5.28)
SODIUM SERPL-SCNC: 135 MMOL/L (ref 136–145)
WBC # BLD AUTO: 12.64 10*3/MM3 (ref 3.4–10.8)

## 2020-07-03 PROCEDURE — 85461 HEMOGLOBIN FETAL: CPT | Performed by: OBSTETRICS & GYNECOLOGY

## 2020-07-03 PROCEDURE — 85027 COMPLETE CBC AUTOMATED: CPT | Performed by: OBSTETRICS & GYNECOLOGY

## 2020-07-03 PROCEDURE — 80053 COMPREHEN METABOLIC PANEL: CPT | Performed by: OBSTETRICS & GYNECOLOGY

## 2020-07-03 PROCEDURE — 25010000002 KETOROLAC TROMETHAMINE PER 15 MG: Performed by: OBSTETRICS & GYNECOLOGY

## 2020-07-03 PROCEDURE — 99024 POSTOP FOLLOW-UP VISIT: CPT | Performed by: OBSTETRICS & GYNECOLOGY

## 2020-07-03 PROCEDURE — 51703 INSERT BLADDER CATH COMPLEX: CPT

## 2020-07-03 PROCEDURE — 25010000003 RHO D IMMUNE GLOBULIN 1500 UNITS SOLUTION PREFILLED SYRINGE: Performed by: OBSTETRICS & GYNECOLOGY

## 2020-07-03 RX ORDER — FERROUS SULFATE TAB EC 324 MG (65 MG FE EQUIVALENT) 324 (65 FE) MG
324 TABLET DELAYED RESPONSE ORAL 2 TIMES DAILY WITH MEALS
Status: DISCONTINUED | OUTPATIENT
Start: 2020-07-03 | End: 2020-07-06 | Stop reason: HOSPADM

## 2020-07-03 RX ORDER — HYDROXYZINE PAMOATE 25 MG/1
25 CAPSULE ORAL EVERY 6 HOURS PRN
Status: DISCONTINUED | OUTPATIENT
Start: 2020-07-03 | End: 2020-07-06 | Stop reason: HOSPADM

## 2020-07-03 RX ORDER — LEVETIRACETAM 500 MG/1
500 TABLET ORAL EVERY 12 HOURS SCHEDULED
Status: COMPLETED | OUTPATIENT
Start: 2020-07-03 | End: 2020-07-03

## 2020-07-03 RX ADMIN — TRAMADOL HYDROCHLORIDE 50 MG: 50 TABLET, FILM COATED ORAL at 20:15

## 2020-07-03 RX ADMIN — SIMETHICONE CHEW TAB 80 MG 80 MG: 80 TABLET ORAL at 20:47

## 2020-07-03 RX ADMIN — LEVETIRACETAM 500 MG: 500 TABLET ORAL at 06:46

## 2020-07-03 RX ADMIN — SIMETHICONE CHEW TAB 80 MG 80 MG: 80 TABLET ORAL at 17:39

## 2020-07-03 RX ADMIN — FERROUS SULFATE TAB EC 324 MG (65 MG FE EQUIVALENT) 324 MG: 324 (65 FE) TABLET DELAYED RESPONSE at 10:46

## 2020-07-03 RX ADMIN — GABAPENTIN 100 MG: 100 CAPSULE ORAL at 20:48

## 2020-07-03 RX ADMIN — SIMETHICONE CHEW TAB 80 MG 80 MG: 80 TABLET ORAL at 08:23

## 2020-07-03 RX ADMIN — SODIUM CHLORIDE, POTASSIUM CHLORIDE, SODIUM LACTATE AND CALCIUM CHLORIDE 50 ML/HR: 600; 310; 30; 20 INJECTION, SOLUTION INTRAVENOUS at 12:02

## 2020-07-03 RX ADMIN — RHO(D) IMMUNE GLOBULIN (HUMAN) 1500 UNITS: 1500 SOLUTION INTRAMUSCULAR at 17:40

## 2020-07-03 RX ADMIN — HYDROXYZINE PAMOATE 25 MG: 25 CAPSULE ORAL at 20:15

## 2020-07-03 RX ADMIN — KETOROLAC TROMETHAMINE 30 MG: 30 INJECTION, SOLUTION INTRAMUSCULAR at 14:32

## 2020-07-03 RX ADMIN — KETOROLAC TROMETHAMINE 30 MG: 30 INJECTION, SOLUTION INTRAMUSCULAR at 01:57

## 2020-07-03 RX ADMIN — ACETAMINOPHEN 1000 MG: 500 TABLET ORAL at 06:46

## 2020-07-03 RX ADMIN — KETOROLAC TROMETHAMINE 30 MG: 30 INJECTION, SOLUTION INTRAMUSCULAR at 08:22

## 2020-07-03 RX ADMIN — TRAMADOL HYDROCHLORIDE 50 MG: 50 TABLET, FILM COATED ORAL at 12:20

## 2020-07-03 RX ADMIN — FERROUS SULFATE TAB EC 324 MG (65 MG FE EQUIVALENT) 324 MG: 324 (65 FE) TABLET DELAYED RESPONSE at 17:39

## 2020-07-03 RX ADMIN — PRENATAL VIT W/ FE FUMARATE-FA TAB 27-0.8 MG 1 TABLET: 27-0.8 TAB at 08:23

## 2020-07-03 RX ADMIN — ACETAMINOPHEN 1000 MG: 500 TABLET ORAL at 14:32

## 2020-07-03 RX ADMIN — SIMETHICONE CHEW TAB 80 MG 80 MG: 80 TABLET ORAL at 12:20

## 2020-07-03 RX ADMIN — GABAPENTIN 100 MG: 100 CAPSULE ORAL at 10:46

## 2020-07-03 RX ADMIN — GABAPENTIN 100 MG: 100 CAPSULE ORAL at 17:39

## 2020-07-03 RX ADMIN — LEVETIRACETAM 500 MG: 500 TABLET ORAL at 20:48

## 2020-07-03 NOTE — ANESTHESIA PROCEDURE NOTES
Airway  Urgency: elective    Date/Time: 7/2/2020 7:30 PM  Airway not difficult    General Information and Staff    Patient location during procedure: OR  CRNA: Marion Crook CRNA    Indications and Patient Condition  Indications for airway management: airway protection    Preoxygenated: yes  MILS not maintained throughout  Mask difficulty assessment: 1 - vent by mask    Final Airway Details  Final airway type: endotracheal airway      Successful airway: ETT  Cuffed: yes   Successful intubation technique: direct laryngoscopy  Facilitating devices/methods: intubating stylet  Endotracheal tube insertion site: oral  Blade: Maty  Blade size: 3  ETT size (mm): 6.5  Cormack-Lehane Classification: grade I - full view of glottis  Placement verified by: chest auscultation and capnometry   Cuff volume (mL): 7  Measured from: lips  ETT/EBT  to lips (cm): 21  Number of attempts at approach: 1  Assessment: lips, teeth, and gum same as pre-op and atraumatic intubation

## 2020-07-03 NOTE — ANESTHESIA POSTPROCEDURE EVALUATION
Patient: Zahra Gasca    Procedure Summary     Date:  20 Room / Location:  [facility] LABOR DELIVERY  [facility] LABOR DELIVERY    Anesthesia Start:   Anesthesia Stop:      Procedure:   SECTION PRIMARY (N/A Abdomen) Diagnosis:      Surgeon:  Alysha Curran MD Provider:  Marion Crook CRNA    Anesthesia Type:  general ASA Status:  3 - Emergent          Anesthesia Type: general    Vitals  Vitals Value Taken Time   /95 2020  8:24 PM   Temp     Pulse 93 2020  8:25 PM   Resp     SpO2 92 % 2020  8:25 PM   Vitals shown include unvalidated device data.        Post Anesthesia Care and Evaluation    Patient location during evaluation: PACU  Patient participation: complete - patient participated  Level of consciousness: awake and awake and alert  Pain score: 4  Pain management: satisfactory to patient  Airway patency: patent  Anesthetic complications: No anesthetic complications  PONV Status: none  Cardiovascular status: acceptable and stable  Respiratory status: acceptable, room air and spontaneous ventilation  Hydration status: acceptable

## 2020-07-04 PROCEDURE — 99024 POSTOP FOLLOW-UP VISIT: CPT | Performed by: OBSTETRICS & GYNECOLOGY

## 2020-07-04 RX ADMIN — FERROUS SULFATE TAB EC 324 MG (65 MG FE EQUIVALENT) 324 MG: 324 (65 FE) TABLET DELAYED RESPONSE at 18:03

## 2020-07-04 RX ADMIN — GABAPENTIN 100 MG: 100 CAPSULE ORAL at 21:06

## 2020-07-04 RX ADMIN — TRAMADOL HYDROCHLORIDE 50 MG: 50 TABLET, FILM COATED ORAL at 02:11

## 2020-07-04 RX ADMIN — FERROUS SULFATE TAB EC 324 MG (65 MG FE EQUIVALENT) 324 MG: 324 (65 FE) TABLET DELAYED RESPONSE at 09:40

## 2020-07-04 RX ADMIN — SIMETHICONE CHEW TAB 80 MG 80 MG: 80 TABLET ORAL at 21:05

## 2020-07-04 RX ADMIN — TRAMADOL HYDROCHLORIDE 100 MG: 50 TABLET, FILM COATED ORAL at 09:41

## 2020-07-04 RX ADMIN — SIMETHICONE CHEW TAB 80 MG 80 MG: 80 TABLET ORAL at 18:03

## 2020-07-04 RX ADMIN — SIMETHICONE CHEW TAB 80 MG 80 MG: 80 TABLET ORAL at 09:40

## 2020-07-04 RX ADMIN — GABAPENTIN 100 MG: 100 CAPSULE ORAL at 09:41

## 2020-07-04 RX ADMIN — GABAPENTIN 100 MG: 100 CAPSULE ORAL at 16:51

## 2020-07-05 PROCEDURE — 99024 POSTOP FOLLOW-UP VISIT: CPT | Performed by: OBSTETRICS & GYNECOLOGY

## 2020-07-05 RX ORDER — NIFEDIPINE 30 MG/1
30 TABLET, EXTENDED RELEASE ORAL
Status: DISCONTINUED | OUTPATIENT
Start: 2020-07-05 | End: 2020-07-05

## 2020-07-05 RX ORDER — NIFEDIPINE 30 MG/1
30 TABLET, EXTENDED RELEASE ORAL ONCE
Status: COMPLETED | OUTPATIENT
Start: 2020-07-05 | End: 2020-07-05

## 2020-07-05 RX ORDER — IBUPROFEN 800 MG/1
800 TABLET ORAL EVERY 8 HOURS SCHEDULED
Qty: 60 TABLET | Refills: 2 | Status: SHIPPED | OUTPATIENT
Start: 2020-07-05 | End: 2020-10-29

## 2020-07-05 RX ORDER — NIFEDIPINE 60 MG/1
60 TABLET, EXTENDED RELEASE ORAL
Status: DISCONTINUED | OUTPATIENT
Start: 2020-07-06 | End: 2020-07-06 | Stop reason: HOSPADM

## 2020-07-05 RX ORDER — TRAMADOL HYDROCHLORIDE 50 MG/1
50 TABLET ORAL EVERY 6 HOURS PRN
Qty: 20 TABLET | Refills: 0 | Status: SHIPPED | OUTPATIENT
Start: 2020-07-05 | End: 2020-07-12

## 2020-07-05 RX ORDER — NIFEDIPINE 30 MG/1
30 TABLET, FILM COATED, EXTENDED RELEASE ORAL
Qty: 30 TABLET | Refills: 0 | Status: SHIPPED | OUTPATIENT
Start: 2020-07-05 | End: 2020-07-08 | Stop reason: SDUPTHER

## 2020-07-05 RX ORDER — GABAPENTIN 100 MG/1
100 CAPSULE ORAL 3 TIMES DAILY
Qty: 30 CAPSULE | Refills: 0 | Status: SHIPPED | OUTPATIENT
Start: 2020-07-05 | End: 2020-07-21

## 2020-07-05 RX ADMIN — GABAPENTIN 100 MG: 100 CAPSULE ORAL at 08:57

## 2020-07-05 RX ADMIN — NIFEDIPINE 30 MG: 30 TABLET, FILM COATED, EXTENDED RELEASE ORAL at 15:39

## 2020-07-05 RX ADMIN — FERROUS SULFATE TAB EC 324 MG (65 MG FE EQUIVALENT) 324 MG: 324 (65 FE) TABLET DELAYED RESPONSE at 08:57

## 2020-07-05 RX ADMIN — GABAPENTIN 100 MG: 100 CAPSULE ORAL at 17:57

## 2020-07-05 RX ADMIN — IBUPROFEN 800 MG: 800 TABLET ORAL at 08:57

## 2020-07-05 RX ADMIN — NIFEDIPINE 30 MG: 30 TABLET, FILM COATED, EXTENDED RELEASE ORAL at 10:12

## 2020-07-05 RX ADMIN — SIMETHICONE CHEW TAB 80 MG 80 MG: 80 TABLET ORAL at 08:57

## 2020-07-05 RX ADMIN — IBUPROFEN 800 MG: 800 TABLET ORAL at 00:15

## 2020-07-05 RX ADMIN — SIMETHICONE CHEW TAB 80 MG 80 MG: 80 TABLET ORAL at 18:28

## 2020-07-05 RX ADMIN — GABAPENTIN 100 MG: 100 CAPSULE ORAL at 21:27

## 2020-07-05 RX ADMIN — FERROUS SULFATE TAB EC 324 MG (65 MG FE EQUIVALENT) 324 MG: 324 (65 FE) TABLET DELAYED RESPONSE at 18:29

## 2020-07-05 RX ADMIN — PRENATAL VIT W/ FE FUMARATE-FA TAB 27-0.8 MG 1 TABLET: 27-0.8 TAB at 08:56

## 2020-07-05 RX ADMIN — SIMETHICONE CHEW TAB 80 MG 80 MG: 80 TABLET ORAL at 21:27

## 2020-07-05 RX ADMIN — IBUPROFEN 800 MG: 800 TABLET ORAL at 15:43

## 2020-07-05 RX ADMIN — TRAMADOL HYDROCHLORIDE 100 MG: 50 TABLET, FILM COATED ORAL at 22:23

## 2020-07-06 PROBLEM — O99.013 ANEMIA DURING PREGNANCY IN THIRD TRIMESTER: Status: ACTIVE | Noted: 2020-07-06

## 2020-07-06 PROBLEM — D62 ANEMIA DUE TO ACUTE BLOOD LOSS: Status: ACTIVE | Noted: 2020-07-06

## 2020-07-06 PROCEDURE — 99024 POSTOP FOLLOW-UP VISIT: CPT | Performed by: OBSTETRICS & GYNECOLOGY

## 2020-07-06 RX ORDER — NIFEDIPINE 30 MG/1
60 TABLET, FILM COATED, EXTENDED RELEASE ORAL
Qty: 30 TABLET | Refills: 0 | Status: SHIPPED | OUTPATIENT
Start: 2020-07-06 | End: 2020-07-14

## 2020-07-06 RX ADMIN — IBUPROFEN 800 MG: 800 TABLET ORAL at 01:24

## 2020-07-06 RX ADMIN — NIFEDIPINE 60 MG: 60 TABLET, FILM COATED, EXTENDED RELEASE ORAL at 08:58

## 2020-07-06 RX ADMIN — SIMETHICONE CHEW TAB 80 MG 80 MG: 80 TABLET ORAL at 08:58

## 2020-07-06 RX ADMIN — IBUPROFEN 800 MG: 800 TABLET ORAL at 08:58

## 2020-07-06 RX ADMIN — GABAPENTIN 100 MG: 100 CAPSULE ORAL at 08:58

## 2020-07-06 RX ADMIN — PRENATAL VIT W/ FE FUMARATE-FA TAB 27-0.8 MG 1 TABLET: 27-0.8 TAB at 08:58

## 2020-07-06 RX ADMIN — FERROUS SULFATE TAB EC 324 MG (65 MG FE EQUIVALENT) 324 MG: 324 (65 FE) TABLET DELAYED RESPONSE at 08:58

## 2020-07-06 RX ADMIN — SIMETHICONE CHEW TAB 80 MG 80 MG: 80 TABLET ORAL at 14:25

## 2020-07-08 ENCOUNTER — OFFICE VISIT (OUTPATIENT)
Dept: OBSTETRICS AND GYNECOLOGY | Facility: CLINIC | Age: 35
End: 2020-07-08

## 2020-07-08 ENCOUNTER — LAB (OUTPATIENT)
Dept: LAB | Facility: HOSPITAL | Age: 35
End: 2020-07-08

## 2020-07-08 ENCOUNTER — TELEPHONE (OUTPATIENT)
Dept: LACTATION | Facility: HOSPITAL | Age: 35
End: 2020-07-08

## 2020-07-08 VITALS
WEIGHT: 151 LBS | BODY MASS INDEX: 26.75 KG/M2 | SYSTOLIC BLOOD PRESSURE: 156 MMHG | HEIGHT: 63 IN | DIASTOLIC BLOOD PRESSURE: 108 MMHG

## 2020-07-08 DIAGNOSIS — Z98.891 S/P CESAREAN SECTION: ICD-10-CM

## 2020-07-08 DIAGNOSIS — O14.13 SEVERE PREECLAMPSIA, THIRD TRIMESTER: ICD-10-CM

## 2020-07-08 DIAGNOSIS — J81.0 ACUTE PULMONARY EDEMA (HCC): ICD-10-CM

## 2020-07-08 DIAGNOSIS — F50.89 PICA: ICD-10-CM

## 2020-07-08 LAB
ALBUMIN SERPL-MCNC: 3.7 G/DL (ref 3.5–5.2)
ALBUMIN/GLOB SERPL: 1.3 G/DL
ALP SERPL-CCNC: 172 U/L (ref 39–117)
ALT SERPL W P-5'-P-CCNC: 25 U/L (ref 1–33)
ANION GAP SERPL CALCULATED.3IONS-SCNC: 10.3 MMOL/L (ref 5–15)
ANISOCYTOSIS BLD QL: ABNORMAL
AST SERPL-CCNC: 22 U/L (ref 1–32)
BILIRUB SERPL-MCNC: 0.4 MG/DL (ref 0–1.2)
BUN SERPL-MCNC: 17 MG/DL (ref 6–20)
BUN/CREAT SERPL: 21 (ref 7–25)
CALCIUM SPEC-SCNC: 9.5 MG/DL (ref 8.6–10.5)
CHLORIDE SERPL-SCNC: 107 MMOL/L (ref 98–107)
CO2 SERPL-SCNC: 19.7 MMOL/L (ref 22–29)
CREAT SERPL-MCNC: 0.81 MG/DL (ref 0.57–1)
DEPRECATED RDW RBC AUTO: 42 FL (ref 37–54)
ERYTHROCYTE [DISTWIDTH] IN BLOOD BY AUTOMATED COUNT: 17.3 % (ref 12.3–15.4)
GFR SERPL CREATININE-BSD FRML MDRD: 80 ML/MIN/1.73
GLOBULIN UR ELPH-MCNC: 2.9 GM/DL
GLUCOSE SERPL-MCNC: 88 MG/DL (ref 65–99)
HCT VFR BLD AUTO: 30.3 % (ref 34–46.6)
HGB BLD-MCNC: 9.5 G/DL (ref 12–15.9)
IRON 24H UR-MRATE: 142 MCG/DL (ref 37–145)
IRON SATN MFR SERPL: 19 % (ref 20–50)
LYMPHOCYTES # BLD MANUAL: 1.13 10*3/MM3 (ref 0.7–3.1)
LYMPHOCYTES NFR BLD MANUAL: 1 % (ref 5–12)
LYMPHOCYTES NFR BLD MANUAL: 11 % (ref 19.6–45.3)
MCH RBC QN AUTO: 22.5 PG (ref 26.6–33)
MCHC RBC AUTO-ENTMCNC: 31.4 G/DL (ref 31.5–35.7)
MCV RBC AUTO: 71.6 FL (ref 79–97)
MONOCYTES # BLD AUTO: 0.1 10*3/MM3 (ref 0.1–0.9)
NEUTROPHILS # BLD AUTO: 9.03 10*3/MM3 (ref 1.7–7)
NEUTROPHILS NFR BLD MANUAL: 88 % (ref 42.7–76)
PLAT MORPH BLD: NORMAL
PLATELET # BLD AUTO: 445 10*3/MM3 (ref 140–450)
PMV BLD AUTO: 10.7 FL (ref 6–12)
POLYCHROMASIA BLD QL SMEAR: ABNORMAL
POTASSIUM SERPL-SCNC: 4.5 MMOL/L (ref 3.5–5.2)
PROT SERPL-MCNC: 6.6 G/DL (ref 6–8.5)
RBC # BLD AUTO: 4.23 10*6/MM3 (ref 3.77–5.28)
SODIUM SERPL-SCNC: 137 MMOL/L (ref 136–145)
TIBC SERPL-MCNC: 733 MCG/DL (ref 298–536)
TRANSFERRIN SERPL-MCNC: 492 MG/DL (ref 200–360)
WBC # BLD AUTO: 10.26 10*3/MM3 (ref 3.4–10.8)
WBC MORPH BLD: NORMAL

## 2020-07-08 PROCEDURE — 0503F POSTPARTUM CARE VISIT: CPT | Performed by: OBSTETRICS & GYNECOLOGY

## 2020-07-08 PROCEDURE — 85025 COMPLETE CBC W/AUTO DIFF WBC: CPT

## 2020-07-08 PROCEDURE — 85007 BL SMEAR W/DIFF WBC COUNT: CPT

## 2020-07-08 PROCEDURE — 83540 ASSAY OF IRON: CPT

## 2020-07-08 PROCEDURE — 84466 ASSAY OF TRANSFERRIN: CPT

## 2020-07-08 PROCEDURE — 36415 COLL VENOUS BLD VENIPUNCTURE: CPT | Performed by: OBSTETRICS & GYNECOLOGY

## 2020-07-08 PROCEDURE — 80053 COMPREHEN METABOLIC PANEL: CPT | Performed by: OBSTETRICS & GYNECOLOGY

## 2020-07-08 RX ORDER — HYDROXYZINE PAMOATE 25 MG/1
25 CAPSULE ORAL 3 TIMES DAILY PRN
Qty: 30 CAPSULE | Refills: 3 | Status: SHIPPED | OUTPATIENT
Start: 2020-07-08 | End: 2021-08-04 | Stop reason: HOSPADM

## 2020-07-08 RX ORDER — DOCUSATE SODIUM 100 MG/1
100 CAPSULE, LIQUID FILLED ORAL 2 TIMES DAILY
COMMUNITY
End: 2020-07-21

## 2020-07-08 NOTE — TELEPHONE ENCOUNTER
Mother was concerned because she wanted to stop breastfeeding and only pump. When she pumps she is not getting milk to flow and her breast are becoming engorged. I suggested that she pump on a schedule every two to 3 hours and use moist heat on the breast and massage to help get the milk to flow. I told her to call me if the problem persists. Mother verbalized understanding.

## 2020-07-09 ENCOUNTER — TELEPHONE (OUTPATIENT)
Dept: OBSTETRICS AND GYNECOLOGY | Facility: CLINIC | Age: 35
End: 2020-07-09

## 2020-07-09 LAB
LAB AP CASE REPORT: NORMAL
PATH REPORT.FINAL DX SPEC: NORMAL

## 2020-07-09 NOTE — TELEPHONE ENCOUNTER
Patient calls having some problems with baby not sleeping.  Ask about medications she was on and breast-feeding reviewed medications she was on and breast-feeding with her.  We will follow-up with her pediatric care provider

## 2020-07-09 NOTE — PROGRESS NOTES
"Chief complaint: Postpartum visit    SUBJECTIVE:   Pt is a 35 y.o.  now s/p primary low transverse  section for severe preeclampsia with pulmonary edema.. Pt denies fever, abdominal pain, n/v/d/c, VB, Pt currently breast feeding and taking PNV, ambulating without difficulty, urinating and stooling without complaints and tolerating normal diet. Pt and  have had intercourse per patient no problems.   Patient with mild range blood pressures at the upper limits of normal, but did not take her blood pressure medications this morning.  Was slightly uncertain how she was supposed to be taking them.  Recommended that she do the Procardia 30 mg twice a day.  Patient still with some swelling around her feet.  Also with concerns for anxiety prescription for Vistaril placed.  OBJECTIVE:  BP (!) 156/108   Ht 160 cm (63\")   Wt 68.5 kg (151 lb)   LMP 2019   BMI 26.75 kg/m²     Review of Systems   Constitutional: Negative for chills, fatigue and fever.   HENT: Negative for sore throat.    Eyes: Negative for visual disturbance.   Respiratory: Negative for cough, shortness of breath and wheezing.    Cardiovascular: Negative for chest pain, palpitations and leg swelling.   Gastrointestinal: Positive for abdominal pain. Negative for diarrhea, nausea and vomiting.   Genitourinary: Negative for dysuria, flank pain, frequency, vaginal bleeding, vaginal discharge and vaginal pain.   Neurological: Negative for syncope, light-headedness and headaches.   Psychiatric/Behavioral: Negative for dysphoric mood and suicidal ideas. The patient is nervous/anxious.        Physical Exam   Constitutional: She is oriented to person, place, and time. She appears well-developed and well-nourished. No distress.   HENT:   Head: Normocephalic.   Abdominal: Soft. She exhibits no distension. There is no tenderness. There is no rebound and no guarding.   Appropriately healing Pfannenstiel incision.  Dressing removed without " difficulty no redness or evidence of infection at this time.   Musculoskeletal: Normal range of motion.   Neurological: She is alert and oriented to person, place, and time.   Skin: Skin is warm and dry. She is not diaphoretic.   Psychiatric: She has a normal mood and affect. Her behavior is normal. Judgment and thought content normal.   Vitals reviewed.      ASSESSMENT:    Pt is a 35 y.o.  s/p primary low transverse  section, overall doing well patient still with elevated blood pressures however has not been taking blood pressure medication.  Recommended that she start doing this routinely will have her return to see us in 1 week for follow-up.  She also with some anxiety will start on Vistaril.  PLAN:   1.  Undecided what she wants for contraception  2. Pt to continue to increase exercise/daily activities as tolerated   3. Continue prenatal vitamins   4. f/u 1 week for repeat blood pressure check and follow-up on anxiety  Med reconciliation completed.        This document has been electronically signed by Nilesh Logan DO on 2020 22:44

## 2020-07-14 ENCOUNTER — POSTPARTUM VISIT (OUTPATIENT)
Dept: OBSTETRICS AND GYNECOLOGY | Facility: CLINIC | Age: 35
End: 2020-07-14

## 2020-07-14 VITALS
DIASTOLIC BLOOD PRESSURE: 84 MMHG | SYSTOLIC BLOOD PRESSURE: 122 MMHG | BODY MASS INDEX: 26.05 KG/M2 | HEIGHT: 63 IN | WEIGHT: 147 LBS

## 2020-07-14 DIAGNOSIS — J81.0 ACUTE PULMONARY EDEMA (HCC): ICD-10-CM

## 2020-07-14 DIAGNOSIS — O14.13 SEVERE PREECLAMPSIA, THIRD TRIMESTER: ICD-10-CM

## 2020-07-14 PROCEDURE — 0503F POSTPARTUM CARE VISIT: CPT | Performed by: OBSTETRICS & GYNECOLOGY

## 2020-07-14 RX ORDER — NIFEDIPINE 30 MG/1
30 TABLET, FILM COATED, EXTENDED RELEASE ORAL
Qty: 30 TABLET | Refills: 0 | Status: SHIPPED | OUTPATIENT
Start: 2020-07-14 | End: 2020-09-01

## 2020-07-14 NOTE — PROGRESS NOTES
"Postpartum visit      Zahra Gasca is a 35 y.o.  s/p , Low Transverse on 7/3/2020 at 40w2d secondary to pulmonary edema w/ preE w/ severe features during IOL for AMA who presents today for postpartum check.  The patient states that she is doing very well.  She reports that she is breathing without complications.  She reports that her swelling has gone down considerably and can now see her ankles.  Denies HA, vision changes, or RUQ pain.  Patient denies postpartum depression but does have anxiety.  Menstrual cycles have not resumed.  Was breastfeeding but switched to bottlefeeding due to concern for transmission of her medications through her breastmilk.  Undecided about contraception.  She has not resumed sexual intercourse.  Denies bowel or bladder issues.    PHYSICAL EXAM:    /84   Ht 160 cm (63\")   Wt 66.7 kg (147 lb)   LMP 2019   BMI 26.04 kg/m²   Abdomen: +BS, benign, no masses, soft, non-tender.  Incision: Well-healed, clean, dry, intact.  Extremities: No deep calf tenderness.  Postpartum Depression Screening Questionnaire: 7, no treatment indicated.    IMPRESSION/PLAN:  35 y.o.  s/p term , Low Transverse on 7/3.  Doing well.  - Recovered nicely from her delivery  - Contraception: Undecided  - Will decrease Procardia down to 1 tablet (30mg)  - RTC 1 week to make sure BP stable; they have a cuff at home and I encouraged that she check her BP 1x per day and after resting for 10 minutes    This document has been electronically signed by Alysha Curran MD on 2020 11:14.  "

## 2020-07-21 ENCOUNTER — OFFICE VISIT (OUTPATIENT)
Dept: OBSTETRICS AND GYNECOLOGY | Facility: CLINIC | Age: 35
End: 2020-07-21

## 2020-07-21 VITALS
SYSTOLIC BLOOD PRESSURE: 122 MMHG | DIASTOLIC BLOOD PRESSURE: 70 MMHG | BODY MASS INDEX: 25.52 KG/M2 | HEIGHT: 63 IN | WEIGHT: 144 LBS

## 2020-07-21 DIAGNOSIS — J81.0 ACUTE PULMONARY EDEMA (HCC): ICD-10-CM

## 2020-07-21 DIAGNOSIS — O14.13 SEVERE PREECLAMPSIA, THIRD TRIMESTER: ICD-10-CM

## 2020-07-21 PROCEDURE — 0503F POSTPARTUM CARE VISIT: CPT | Performed by: OBSTETRICS & GYNECOLOGY

## 2020-07-21 NOTE — PROGRESS NOTES
"PP BP check    Doing well, no complaints.  Has not been checking BP at home.  Denies HA, vision changes, RUQ pain, CP, or SOB.    /70   Ht 160 cm (63\")   Wt 65.3 kg (144 lb)   LMP 2019   BMI 25.51 kg/m²   Gen: NAD, AAO x3  Abd: Soft, NTND, uterus nontender, incision c/d/i    A/P: Zahra Gasca is a 35 y.o.  s/p PLTCS at 40w2d secondary to acute pulmonary edema in the setting of preE w/ severe features during IOL for AMA.  Doing very well.  - Continue to wean off Procardia to every other day x1 week then stop next week  - RTC 4wks for final PP visit  - Precautions given    This document has been electronically signed by Alysha Curran MD on 2020 08:52.  "

## 2020-07-23 VITALS
DIASTOLIC BLOOD PRESSURE: 75 MMHG | WEIGHT: 182 LBS | HEIGHT: 63 IN | OXYGEN SATURATION: 98 % | TEMPERATURE: 98.2 F | SYSTOLIC BLOOD PRESSURE: 130 MMHG | HEART RATE: 116 BPM | RESPIRATION RATE: 18 BRPM | BODY MASS INDEX: 32.25 KG/M2

## 2020-08-11 ENCOUNTER — TELEPHONE (OUTPATIENT)
Dept: OBSTETRICS AND GYNECOLOGY | Facility: CLINIC | Age: 35
End: 2020-08-11

## 2020-08-11 NOTE — TELEPHONE ENCOUNTER
PATIENT IS NEEDING A LETTER TO RETURN BACK TO WORK. SHE WILL BE GOING BACK ON 08/17/2020  SHE WILL  THE LETTER

## 2020-09-01 ENCOUNTER — POSTPARTUM VISIT (OUTPATIENT)
Dept: OBSTETRICS AND GYNECOLOGY | Facility: CLINIC | Age: 35
End: 2020-09-01

## 2020-09-01 VITALS
WEIGHT: 148 LBS | HEIGHT: 63 IN | BODY MASS INDEX: 26.22 KG/M2 | DIASTOLIC BLOOD PRESSURE: 78 MMHG | SYSTOLIC BLOOD PRESSURE: 112 MMHG

## 2020-09-01 DIAGNOSIS — J81.0 ACUTE PULMONARY EDEMA (HCC): ICD-10-CM

## 2020-09-01 DIAGNOSIS — O14.13 SEVERE PREECLAMPSIA, THIRD TRIMESTER: ICD-10-CM

## 2020-09-01 PROBLEM — O21.9 NAUSEA AND VOMITING DURING PREGNANCY PRIOR TO 22 WEEKS GESTATION: Status: RESOLVED | Noted: 2019-11-27 | Resolved: 2020-09-01

## 2020-09-01 PROBLEM — O09.93 SUPERVISION OF HIGH RISK PREGNANCY IN THIRD TRIMESTER: Status: RESOLVED | Noted: 2019-11-27 | Resolved: 2020-09-01

## 2020-09-01 PROBLEM — D62 ANEMIA DUE TO ACUTE BLOOD LOSS: Status: RESOLVED | Noted: 2020-07-06 | Resolved: 2020-09-01

## 2020-09-01 PROBLEM — O99.810 GLUCOSE INTOLERANCE OF PREGNANCY: Status: RESOLVED | Noted: 2020-05-18 | Resolved: 2020-09-01

## 2020-09-01 PROBLEM — O09.513 PRIMIGRAVIDA OF ADVANCED MATERNAL AGE IN THIRD TRIMESTER: Status: RESOLVED | Noted: 2020-05-18 | Resolved: 2020-09-01

## 2020-09-01 PROBLEM — O99.820 GBS (GROUP B STREPTOCOCCUS CARRIER), +RV CULTURE, CURRENTLY PREGNANT: Status: RESOLVED | Noted: 2020-06-04 | Resolved: 2020-09-01

## 2020-09-01 PROBLEM — O99.013 ANEMIA DURING PREGNANCY IN THIRD TRIMESTER: Status: RESOLVED | Noted: 2020-07-06 | Resolved: 2020-09-01

## 2020-09-01 PROBLEM — Z67.91 RH NEGATIVE STATUS DURING PREGNANCY IN THIRD TRIMESTER: Status: RESOLVED | Noted: 2020-01-28 | Resolved: 2020-09-01

## 2020-09-01 PROBLEM — O26.893 RH NEGATIVE STATUS DURING PREGNANCY IN THIRD TRIMESTER: Status: RESOLVED | Noted: 2020-01-28 | Resolved: 2020-09-01

## 2020-09-01 PROCEDURE — 0503F POSTPARTUM CARE VISIT: CPT | Performed by: OBSTETRICS & GYNECOLOGY

## 2020-09-01 NOTE — PROGRESS NOTES
"Postpartum visit      Zahra Gasca is a 35 y.o.  s/p , Low Transverse on 7/3/2020 at 40w2d secondary to pulmonary edema w/ preE w/ severe features during IOL for AMA who presents today for postpartum check.  The patient states that she is doing well.  Denies HA, vision changes, or RUQ pain.  Menstrual cycles have not resumed.  Was breastfeeding but now bottlefeeding.  Undecided about contraception.  She has not resumed sexual intercourse.  Denies bowel or bladder issues.  She was started on Vistaril for anxiety and doing well on that.  However, she still feels overwhelmed.    PHYSICAL EXAM:    /78   Ht 160 cm (63\")   Wt 67.1 kg (148 lb)   LMP 2019   BMI 26.22 kg/m²   Abdomen: +BS, benign, no masses, soft, non-tender.  Incision: Well-healed, clean, dry, intact.  Extremities: No deep calf tenderness.  Postpartum Depression Screening Questionnaire: 11, treatment indicated.    IMPRESSION/PLAN:  35 y.o.  s/p term , Low Transverse on 7/3, complicated by preE w/ severe features w/ acute pulmonary edema.  Doing well.  - Recovered nicely from her delivery  - Contraception: Undecided  - RTC for pap smear  - [facility] referral placed    This document has been electronically signed by Alysha Curran MD on 2020 16:47.  "

## 2020-10-29 ENCOUNTER — OFFICE VISIT (OUTPATIENT)
Dept: OBSTETRICS AND GYNECOLOGY | Facility: CLINIC | Age: 35
End: 2020-10-29

## 2020-10-29 VITALS — BODY MASS INDEX: 27.14 KG/M2 | DIASTOLIC BLOOD PRESSURE: 72 MMHG | WEIGHT: 153.2 LBS | SYSTOLIC BLOOD PRESSURE: 120 MMHG

## 2020-10-29 DIAGNOSIS — N63.10 BREAST MASS, RIGHT: ICD-10-CM

## 2020-10-29 DIAGNOSIS — Z01.419 WOMEN'S ANNUAL ROUTINE GYNECOLOGICAL EXAMINATION: Primary | ICD-10-CM

## 2020-10-29 PROCEDURE — 99395 PREV VISIT EST AGE 18-39: CPT | Performed by: OBSTETRICS & GYNECOLOGY

## 2020-10-29 NOTE — PROGRESS NOTES
"[facility]  Gynecology    CC: Annual well woman exam    HPI  Zahra Gasca is a 35 y.o.  premenopausal female who presents for her annual well woman exam.  The patient has no complaints.  She states that she \"has some bumps down there\" and doesn't know what they are.  She states that her last pap smear was \"years ago\" and she was +HPV.  Denies any vaginal itching, burning, irritation, or discharge.  Denies any abnormal uterine bleeding.  Continues to be sexually active.  Denies any sexual dysfunction concerns.  Denies any urinary symptoms including incontinence, dysuria, frequency, urgency, nocturia.    She exercises regularly: yes.  She wears her seat belt:yes.  She has concerns about domestic violence: no.  She has noticed changes in height: no.    ROS  Review of Systems   Constitutional: Negative.    HENT: Negative.    Eyes: Negative.    Respiratory: Negative.    Cardiovascular: Negative.    Gastrointestinal: Negative.    Endocrine: Negative.    Genitourinary: Negative.    Musculoskeletal: Negative.    Skin: Negative.    Neurological: Negative.    Hematological: Negative.    Psychiatric/Behavioral: Negative.         HEALTH MAINTENANCE  Mammogram: N/A  Colonoscopy: N/A  DEXA scan: N/A  Pap smear: \"Years ago\"    GYN HISTORY  Menarche: age 11  Menses: Regular, every 28 days, lasts 4-5 days, normal flow, no intermenstrual bleeding  History of STIs: None  Last pap smear: >3 years ago  Abnormal pap smear history: +HPV  Contraception: Condoms    OB HISTORY  OB History    Para Term  AB Living   1 1 1     1   SAB TAB Ectopic Molar Multiple Live Births           0 1      # Outcome Date GA Lbr Suresh/2nd Weight Sex Delivery Anes PTL Lv   1 Term 20 40w2d  3380 g (7 lb 7.2 oz) F CS-LTranv Gen N PALAK      Complications: Pulmonary edema, Preeclampsia, severe, third trimester     PAST MEDICAL HISTORY  Past Medical History:   Diagnosis Date   • Anemia due to acute blood loss 2020 "   • Severe preeclampsia, third trimester 2020     PAST SURGICAL HISTORY  Past Surgical History:   Procedure Laterality Date   •  SECTION N/A 2020    Procedure:  SECTION PRIMARY;  Surgeon: Alysha Curran MD;  Location: [facility] LABOR DELIVERY;  Service: Obstetrics;  Laterality: N/A;     FAMILY HISTORY  Family History   Problem Relation Age of Onset   • Heart disease Father    • No Known Problems Mother    • No Known Problems Brother    • Cancer Paternal Grandfather    • Heart disease Paternal Grandmother    • Heart disease Maternal Grandfather      SOCIAL HISTORY  Social History     Socioeconomic History   • Marital status:      Spouse name: Not on file   • Number of children: Not on file   • Years of education: Not on file   • Highest education level: Not on file   Tobacco Use   • Smoking status: Former Smoker   • Smokeless tobacco: Never Used   • Tobacco comment: quit 10 years ago   Substance and Sexual Activity   • Alcohol use: No     Frequency: Never   • Drug use: No   • Sexual activity: Yes     Partners: Male     Comment: unsure about last pap smear      HOME MEDICATIONS  Prior to Admission medications    Medication Sig Start Date End Date Taking? Authorizing Provider   hydrOXYzine pamoate (Vistaril) 25 MG capsule Take 1 capsule by mouth 3 (Three) Times a Day As Needed for Anxiety. 20   Nilesh Logan DO   Prenatal Vit-Fe Fumarate-FA (PRENATAL, CLASSIC, VITAMIN) 28-0.8 MG tablet tablet Take  by mouth Daily.    ProviderJose Antonio MD   Ferrous Gluconate (IRON 27 PO) Take  by mouth.  10/29/20  Jose Antonio Wheeler MD   ibuprofen (ADVIL,MOTRIN) 800 MG tablet Take 1 tablet by mouth Every 8 (Eight) Hours. 7/5/20 10/29/20  Nilesh Logan DO     ALLERGIES  No Known Allergies    PE  There were no vitals taken for this visit.       General: Alert, healthy, no distress, well nourished and well developed   Neurologic: Alert, oriented to person, place, and  time.  Gait normal.  Cranial nerves II-XII grossly intact.  HEENT: Normocephalic, atraumatic.  Extraocular muscles intact, pupils equal and reactive times two.    Neck: Supple, no adenopathy, thyroid normal size, non-tender, without nodularity, trachea midline   Breasts: No skin dimpling, skin retraction, nipple discharge, or asymmetry bilaterally.  ?2-3 cm breast mass palpated on right breast at 10 o'clock, 4 cm from the nipple; suspect possible cyst or clump of fatty breast tissue.  Lungs: Normal respiratory effort.  Clear to auscultation bilaterally.   Heart: Regular rate and rhythm, without murmer, rub or gallop.   Abdomen: Soft, non-tender, non-distended,no masses, no hepatosplenomegaly, no hernia.    Skin: No rash, no lesions.  Extremities: No cyanosis, clubbing or edema  PELVIC EXAM:  External Genitalia/Vulva: Anatomy is normal, no significant redness of labia, no discharge on vulvar tissues, [place]'s and Bartholin's glands are normal, no ulcers, no condylomatous lesions.  Urethral meatus: Normal, no lesions, no prolapse.  Urethra: Normal, no masses, no tenderness with palpation.  Bladder: Normal, no fullness, no masses, no tenderness with palpation.  Vagina: Vaginal tissues are not inflamed, normal color and texture, no significant discharge present.  Pelvic support adequate.  Cervix: Normal, no lesions, no purulent discharge, no cervical motion tenderness.  Uterus: Normal size, shape, and consistency.  Good mobility noted.  Minimal descent noted with good support.  Adnexa: Normal size and shape bilaterally, no palpable mass bilaterally and non-tender bilaterally.  Rectal: Normal, no masses or polyps, confirms bimanual exam, perianal normal, no lesions; MICHELLE deferred.    IMPRESSION  Zahra Gasca is a 35 y.o.  presenting with annual gynecological exam.    PLAN    1. Women's annual routine gynecological examination  - Liquid-based Pap Smear, Screening  - Mammogram screening at age 40  - RTC 1 year  for annual    2. Breast mass, right  Discussed that I suspect that this is either a breast cyst, fibroadenoma, or deposit of fatty breast tissue.  Will obtain US evaluate.  - US Breast Right Limited; Future    This document has been electronically signed by Alysha Curran MD on October 29, 2020 15:19 CDT.

## 2020-11-05 LAB
LAB AP CASE REPORT: NORMAL
PATH INTERP SPEC-IMP: NORMAL

## 2020-11-06 ENCOUNTER — TELEPHONE (OUTPATIENT)
Dept: OBSTETRICS AND GYNECOLOGY | Facility: CLINIC | Age: 35
End: 2020-11-06

## 2020-11-06 NOTE — TELEPHONE ENCOUNTER
----- Message from Alysha Curran MD sent at 11/5/2020  4:09 PM CST -----  Cytology negative, HRHPV negative.  Please send normal pap smear letter; needs repeat pap smear in 5 years.

## 2020-11-06 NOTE — TELEPHONE ENCOUNTER
Patient returned call to office notified her of normal results patient verbalized understanding and has no other concerns at this time.

## 2021-08-04 ENCOUNTER — LAB (OUTPATIENT)
Dept: LAB | Facility: HOSPITAL | Age: 36
End: 2021-08-04

## 2021-08-04 ENCOUNTER — INITIAL PRENATAL (OUTPATIENT)
Dept: OBSTETRICS AND GYNECOLOGY | Facility: CLINIC | Age: 36
End: 2021-08-04

## 2021-08-04 VITALS — DIASTOLIC BLOOD PRESSURE: 66 MMHG | BODY MASS INDEX: 24.62 KG/M2 | WEIGHT: 139 LBS | SYSTOLIC BLOOD PRESSURE: 100 MMHG

## 2021-08-04 DIAGNOSIS — Z32.00 PREGNANCY EXAMINATION OR TEST, PREGNANCY UNCONFIRMED: Primary | ICD-10-CM

## 2021-08-04 DIAGNOSIS — O09.299 HX OF PREECLAMPSIA, PRIOR PREGNANCY, CURRENTLY PREGNANT: ICD-10-CM

## 2021-08-04 DIAGNOSIS — Z32.01 POSITIVE PREGNANCY TEST: ICD-10-CM

## 2021-08-04 DIAGNOSIS — Z36.87 UNSURE OF LMP (LAST MENSTRUAL PERIOD) AS REASON FOR ULTRASOUND SCAN: ICD-10-CM

## 2021-08-04 DIAGNOSIS — Z98.891 HISTORY OF C-SECTION: ICD-10-CM

## 2021-08-04 DIAGNOSIS — O09.529 ANTEPARTUM MULTIGRAVIDA OF ADVANCED MATERNAL AGE: ICD-10-CM

## 2021-08-04 DIAGNOSIS — O36.80X0 ENCOUNTER TO DETERMINE FETAL VIABILITY OF PREGNANCY, SINGLE OR UNSPECIFIED FETUS: ICD-10-CM

## 2021-08-04 LAB
ABO GROUP BLD: NORMAL
ALBUMIN SERPL-MCNC: 4.8 G/DL (ref 3.5–5.2)
ALBUMIN/GLOB SERPL: 1.8 G/DL
ALP SERPL-CCNC: 83 U/L (ref 39–117)
ALT SERPL W P-5'-P-CCNC: 11 U/L (ref 1–33)
AMPHET+METHAMPHET UR QL: NEGATIVE
AMPHETAMINES UR QL: NEGATIVE
ANION GAP SERPL CALCULATED.3IONS-SCNC: 11.9 MMOL/L (ref 5–15)
AST SERPL-CCNC: 16 U/L (ref 1–32)
B-HCG UR QL: POSITIVE
BARBITURATES UR QL SCN: NEGATIVE
BASOPHILS # BLD AUTO: 0.03 10*3/MM3 (ref 0–0.2)
BASOPHILS NFR BLD AUTO: 0.5 % (ref 0–1.5)
BENZODIAZ UR QL SCN: NEGATIVE
BILIRUB SERPL-MCNC: 0.3 MG/DL (ref 0–1.2)
BILIRUB UR QL STRIP: NEGATIVE
BLD GP AB SCN SERPL QL: NEGATIVE
BUN SERPL-MCNC: 8 MG/DL (ref 6–20)
BUN/CREAT SERPL: 9.2 (ref 7–25)
BUPRENORPHINE SERPL-MCNC: NEGATIVE NG/ML
CALCIUM SPEC-SCNC: 9.4 MG/DL (ref 8.6–10.5)
CANNABINOIDS SERPL QL: NEGATIVE
CHLORIDE SERPL-SCNC: 102 MMOL/L (ref 98–107)
CLARITY UR: ABNORMAL
CO2 SERPL-SCNC: 22.1 MMOL/L (ref 22–29)
COCAINE UR QL: NEGATIVE
COLOR UR: YELLOW
CREAT SERPL-MCNC: 0.87 MG/DL (ref 0.57–1)
DEPRECATED RDW RBC AUTO: 37.6 FL (ref 37–54)
EOSINOPHIL # BLD AUTO: 0.05 10*3/MM3 (ref 0–0.4)
EOSINOPHIL NFR BLD AUTO: 0.8 % (ref 0.3–6.2)
ERYTHROCYTE [DISTWIDTH] IN BLOOD BY AUTOMATED COUNT: 13 % (ref 12.3–15.4)
GFR SERPL CREATININE-BSD FRML MDRD: 74 ML/MIN/1.73
GLOBULIN UR ELPH-MCNC: 2.7 GM/DL
GLUCOSE SERPL-MCNC: 94 MG/DL (ref 65–99)
GLUCOSE UR STRIP-MCNC: NEGATIVE MG/DL
HBV SURFACE AG SERPL QL IA: NORMAL
HCG INTACT+B SERPL-ACNC: NORMAL MIU/ML
HCT VFR BLD AUTO: 42.4 % (ref 34–46.6)
HCV AB SER DONR QL: NORMAL
HGB BLD-MCNC: 14.7 G/DL (ref 12–15.9)
HGB UR QL STRIP.AUTO: NEGATIVE
HIV1+2 AB SER QL: NORMAL
HOLD SPECIMEN: NORMAL
HOLD SPECIMEN: NORMAL
IMM GRANULOCYTES # BLD AUTO: 0.01 10*3/MM3 (ref 0–0.05)
IMM GRANULOCYTES NFR BLD AUTO: 0.2 % (ref 0–0.5)
INTERNAL NEGATIVE CONTROL: NEGATIVE
INTERNAL POSITIVE CONTROL: POSITIVE
KETONES UR QL STRIP: ABNORMAL
LEUKOCYTE ESTERASE UR QL STRIP.AUTO: NEGATIVE
LYMPHOCYTES # BLD AUTO: 1.24 10*3/MM3 (ref 0.7–3.1)
LYMPHOCYTES NFR BLD AUTO: 19.3 % (ref 19.6–45.3)
Lab: ABNORMAL
Lab: NORMAL
MCH RBC QN AUTO: 28.2 PG (ref 26.6–33)
MCHC RBC AUTO-ENTMCNC: 34.7 G/DL (ref 31.5–35.7)
MCV RBC AUTO: 81.2 FL (ref 79–97)
METHADONE UR QL SCN: NEGATIVE
MONOCYTES # BLD AUTO: 0.43 10*3/MM3 (ref 0.1–0.9)
MONOCYTES NFR BLD AUTO: 6.7 % (ref 5–12)
NEUTROPHILS NFR BLD AUTO: 4.65 10*3/MM3 (ref 1.7–7)
NEUTROPHILS NFR BLD AUTO: 72.5 % (ref 42.7–76)
NITRITE UR QL STRIP: NEGATIVE
NRBC BLD AUTO-RTO: 0 /100 WBC (ref 0–0.2)
OPIATES UR QL: NEGATIVE
OXYCODONE UR QL SCN: NEGATIVE
PCP UR QL SCN: NEGATIVE
PH UR STRIP.AUTO: 5.5 [PH] (ref 5–8)
PLATELET # BLD AUTO: 276 10*3/MM3 (ref 140–450)
PMV BLD AUTO: 11.6 FL (ref 6–12)
POTASSIUM SERPL-SCNC: 3.9 MMOL/L (ref 3.5–5.2)
PROPOXYPH UR QL: NEGATIVE
PROT SERPL-MCNC: 7.5 G/DL (ref 6–8.5)
PROT UR QL STRIP: NEGATIVE
RBC # BLD AUTO: 5.22 10*6/MM3 (ref 3.77–5.28)
RH BLD: NEGATIVE
SODIUM SERPL-SCNC: 136 MMOL/L (ref 136–145)
SP GR UR STRIP: >=1.03 (ref 1–1.03)
TRICYCLICS UR QL SCN: NEGATIVE
UROBILINOGEN UR QL STRIP: ABNORMAL
WBC # BLD AUTO: 6.41 10*3/MM3 (ref 3.4–10.8)

## 2021-08-04 PROCEDURE — 86803 HEPATITIS C AB TEST: CPT | Performed by: NURSE PRACTITIONER

## 2021-08-04 PROCEDURE — 87661 TRICHOMONAS VAGINALIS AMPLIF: CPT | Performed by: NURSE PRACTITIONER

## 2021-08-04 PROCEDURE — 86901 BLOOD TYPING SEROLOGIC RH(D): CPT | Performed by: NURSE PRACTITIONER

## 2021-08-04 PROCEDURE — 87591 N.GONORRHOEAE DNA AMP PROB: CPT | Performed by: NURSE PRACTITIONER

## 2021-08-04 PROCEDURE — 86900 BLOOD TYPING SEROLOGIC ABO: CPT | Performed by: NURSE PRACTITIONER

## 2021-08-04 PROCEDURE — 80053 COMPREHEN METABOLIC PANEL: CPT | Performed by: NURSE PRACTITIONER

## 2021-08-04 PROCEDURE — 87086 URINE CULTURE/COLONY COUNT: CPT | Performed by: NURSE PRACTITIONER

## 2021-08-04 PROCEDURE — 86850 RBC ANTIBODY SCREEN: CPT | Performed by: NURSE PRACTITIONER

## 2021-08-04 PROCEDURE — 84702 CHORIONIC GONADOTROPIN TEST: CPT | Performed by: NURSE PRACTITIONER

## 2021-08-04 PROCEDURE — 80081 OBSTETRIC PANEL INC HIV TSTG: CPT | Performed by: NURSE PRACTITIONER

## 2021-08-04 PROCEDURE — 86762 RUBELLA ANTIBODY: CPT | Performed by: NURSE PRACTITIONER

## 2021-08-04 PROCEDURE — 81025 URINE PREGNANCY TEST: CPT | Performed by: NURSE PRACTITIONER

## 2021-08-04 PROCEDURE — 81003 URINALYSIS AUTO W/O SCOPE: CPT | Performed by: NURSE PRACTITIONER

## 2021-08-04 PROCEDURE — 80306 DRUG TEST PRSMV INSTRMNT: CPT | Performed by: NURSE PRACTITIONER

## 2021-08-04 PROCEDURE — 87491 CHLMYD TRACH DNA AMP PROBE: CPT | Performed by: NURSE PRACTITIONER

## 2021-08-04 PROCEDURE — 36415 COLL VENOUS BLD VENIPUNCTURE: CPT

## 2021-08-04 NOTE — PROGRESS NOTES
I spent approximately 60 minutes with the patient acquiring the health and history intake and discussing topics related to healthy lifestyle. She had a positive home pregnancy test. Her UPT in office today is positive.  Her LMP is approximately 6/26/21.  This is her 2nd pregnancy. She had an induction of labor with her daughter at 40 weeks and 2 days gestation. She had preeclampsia with severe features. She developed pulmonary edema, and a csection was done under general anesthesia.   She denies any health problems. She does have anxiety. She filled out the depression screening questionnaire and scored 5. She has had chicken pox. She says she had an abornal pap smear, but that was years ago. Her last pap smear was done on 10/29/20. The result was negative with HPV negative. She is not due another pap smear til October 2025. She is a former smoker. She says she has not smoked for many years. She denies any alcohol, tobacco or drug use.   A newob bag is given. The 1st trimester teaching was done with the patient. We discussed a healthy diet and exercise and what is recommended. She is taking a prenatal vitamin. I also discussed Listeriosis and Toxoplasmosis and what fish to avoid due to high mercury levels. I informed patient not to be in hot tubs, saunas, or tanning beds. We discussed that spotting may occur after intercourse which is common, but if heavy bleeding like a period occurs to [facility] if clinic is closed.  I encouraged her to make an appointment with the dentist if she has not had a dental exam and cleaning in the last 6 months. She says she has an upcoming appointment to get a tooth filling. She plans to breastfeed.  I gave her pamphlet on breastfeeding classes and the breastfeeding mothers support group. These services are provided by Gloria Puga, Lactation Consultant. I encouraged the patient to get the TDAP vaccine in the 3rd trimester.  I discussed with the patient that a  pediatrician needs to be chosen prior to delivery for the infant to have an appointment scheduled before leaving the hospital. She says her daughter sees Heidi MARSHALL. She plans to continue with her.  I discussed lab tests will be done today. All questions were answered at this time. She is scheduled for an ultrasound and to see Jennifer MARSHALL on 8/20/21.

## 2021-08-05 LAB
BACTERIA SPEC AEROBE CULT: NO GROWTH
C TRACH RRNA CVX QL NAA+PROBE: NEGATIVE
N GONORRHOEA RRNA SPEC QL NAA+PROBE: NEGATIVE
RPR SER QL: NORMAL
RUBV IGG SERPL IA-ACNC: 5.36 INDEX
TRICHOMONAS VAGINALIS PCR: NEGATIVE

## 2021-08-11 ENCOUNTER — TELEPHONE (OUTPATIENT)
Dept: OBSTETRICS AND GYNECOLOGY | Facility: CLINIC | Age: 36
End: 2021-08-11

## 2021-08-11 RX ORDER — DOXYLAMINE SUCCINATE AND PYRIDOXINE HYDROCHLORIDE, DELAYED RELEASE TABLETS 10 MG/10 MG 10; 10 MG/1; MG/1
TABLET, DELAYED RELEASE ORAL
Qty: 60 TABLET | Refills: 6 | Status: ON HOLD | OUTPATIENT
Start: 2021-08-11 | End: 2022-03-07

## 2021-08-11 NOTE — TELEPHONE ENCOUNTER
"Patient called stating she is having bad nausea and is wanting to see about getting diclegis sent to her pharmacy because that is \"all she could take in the last pregnancy\".    Patient uses [facility] pharmacy in [place].  Patient number is [phone].  "

## 2021-08-20 ENCOUNTER — INITIAL PRENATAL (OUTPATIENT)
Dept: OBSTETRICS AND GYNECOLOGY | Facility: CLINIC | Age: 36
End: 2021-08-20

## 2021-08-20 ENCOUNTER — TELEPHONE (OUTPATIENT)
Dept: OBSTETRICS AND GYNECOLOGY | Facility: CLINIC | Age: 36
End: 2021-08-20

## 2021-08-20 VITALS — DIASTOLIC BLOOD PRESSURE: 62 MMHG | WEIGHT: 139 LBS | SYSTOLIC BLOOD PRESSURE: 98 MMHG | BODY MASS INDEX: 24.62 KG/M2

## 2021-08-20 DIAGNOSIS — O09.521 SUPERVISION OF HIGH RISK ELDERLY MULTIGRAVIDA IN FIRST TRIMESTER: ICD-10-CM

## 2021-08-20 DIAGNOSIS — O21.9 NAUSEA AND VOMITING DURING PREGNANCY PRIOR TO 22 WEEKS GESTATION: ICD-10-CM

## 2021-08-20 DIAGNOSIS — Z3A.01 7 WEEKS GESTATION OF PREGNANCY: Primary | ICD-10-CM

## 2021-08-20 DIAGNOSIS — F41.9 ANXIETY: ICD-10-CM

## 2021-08-20 DIAGNOSIS — O09.291 HISTORY OF PRE-ECLAMPSIA IN PRIOR PREGNANCY, CURRENTLY PREGNANT IN FIRST TRIMESTER: ICD-10-CM

## 2021-08-20 PROCEDURE — 0501F PRENATAL FLOW SHEET: CPT | Performed by: NURSE PRACTITIONER

## 2021-08-20 RX ORDER — BUSPIRONE HYDROCHLORIDE 15 MG/1
15 TABLET ORAL 2 TIMES DAILY PRN
Qty: 60 TABLET | Refills: 6 | Status: SHIPPED | OUTPATIENT
Start: 2021-08-20 | End: 2021-09-08

## 2021-08-21 PROBLEM — O21.9 NAUSEA AND VOMITING DURING PREGNANCY PRIOR TO 22 WEEKS GESTATION: Status: ACTIVE | Noted: 2021-08-21

## 2021-08-21 PROBLEM — F41.9 ANXIETY: Status: ACTIVE | Noted: 2021-08-21

## 2021-08-21 PROBLEM — O09.521 SUPERVISION OF HIGH RISK ELDERLY MULTIGRAVIDA IN FIRST TRIMESTER: Status: ACTIVE | Noted: 2021-08-21

## 2021-08-21 PROBLEM — O09.291 HISTORY OF PRE-ECLAMPSIA IN PRIOR PREGNANCY, CURRENTLY PREGNANT IN FIRST TRIMESTER: Status: ACTIVE | Noted: 2021-08-21

## 2021-08-21 NOTE — PROGRESS NOTES
[facility]  Obstetrics Visit    CHIEF COMPLAINT:  New prenatal visit    HISTORY OF PRESENT ILLNESS:  Zahra Gasca is a 36 y.o. y/o  at 7w6d by LMP (Patient's last menstrual period was 2021 (approximate).  This was a planned pregnancy and the patient is supported by her  and daughter today.  Patient desires as needed treatment for anxiety.  Reports nausea with vomiting controlled with current use of diclegis. Diclegis is the only medication that provides patient with relief.  She reports being sensitive to narcotic pain medications.  She denies any cramping or vaginal bleeding.  She has started taking a prenatal vitamin.    REVIEW OF SYSTEMS  Review of Systems   Constitutional: Negative for activity change, appetite change, chills, diaphoresis, fatigue, fever, unexpected weight gain and unexpected weight loss.   Respiratory: Negative for apnea, chest tightness and shortness of breath.    Cardiovascular: Negative for chest pain, palpitations and leg swelling.   Gastrointestinal: Positive for nausea and vomiting. Negative for abdominal distention, abdominal pain, constipation, diarrhea, rectal pain, GERD and indigestion.   Genitourinary: Negative for amenorrhea, breast discharge, breast lump, breast pain, decreased libido, decreased urine volume, difficulty urinating, dyspareunia, dysuria, flank pain, frequency, genital sores, hematuria, pelvic pain, pelvic pressure, urgency, urinary incontinence, vaginal bleeding, vaginal discharge and vaginal pain.   Musculoskeletal: Negative for myalgias.   Skin: Negative for color change, dry skin and skin lesions.   Neurological: Negative for light-headedness and headache.   Psychiatric/Behavioral: Negative for agitation, dysphoric mood, sleep disturbance, suicidal ideas, depressed mood and stress. The patient is nervous/anxious.        PRENATAL RISK FACTORS   Problems (from 21 to present)     Problem Noted Resolved     Supervision of high risk elderly multigravida in first trimester 2021 by Jennifer Shi APRN No    Anxiety 2021 by Jennifer Shi APRN No    Nausea and vomiting during pregnancy prior to 22 weeks gestation 2021 by Jennifer Shi APRN No    History of pre-eclampsia in prior pregnancy, currently pregnant in first trimester 2021 by Jennifer Shi APRN No          DATING CRITERIA:  LMP (2021) -- ALEXEY 2022  1TUS (2021 at 8w0d) -- ALEXEY 2022    OBSTETRIC HISTORY:  OB History    Para Term  AB Living   2 1 1     1   SAB TAB Ectopic Molar Multiple Live Births           0 1      # Outcome Date GA Lbr Suresh/2nd Weight Sex Delivery Anes PTL Lv   2 Current            1 Term 20 40w2d  3380 g (7 lb 7.2 oz) F CS-LTranv Gen N PALAK      Complications: Pulmonary edema, Preeclampsia, severe, third trimester     GYN HISTORY:  Denies h/o sexually transmitted infections/pelvic inflammatory disease  Last pap smear:   Last Completed Pap Smear          PAP SMEAR (Every 3 Years) Next due on 10/29/2023    10/29/2020  Liquid-based Pap Smear, Screening                  PAST MEDICAL HISTORY:  Past Medical History:   Diagnosis Date   • Abnormal Pap smear of cervix    • Anxiety    • History of pre-eclampsia 2020    w/ severe features, pulmonary edema   • Preeclampsia    • Varicella      PAST SURGICAL HISTORY:  Past Surgical History:   Procedure Laterality Date   •  SECTION N/A 2020    Procedure:  SECTION PRIMARY;  Surgeon: Alysha Curran MD;  Location: [facility] LABOR DELIVERY;  Service: Obstetrics;  Laterality: N/A;     FAMILY HISTORY:  Family History   Problem Relation Age of Onset   • Heart disease Father    • Diabetes Father    • Heart attack Father    • No Known Problems Brother    • Cancer Paternal Grandfather    • Heart disease Maternal Grandfather    • Heart attack Maternal Grandfather    •  No Known Problems Daughter      SOCIAL HISTORY:  Social History     Socioeconomic History   • Marital status:      Spouse name: Not on file   • Number of children: Not on file   • Years of education: Not on file   • Highest education level: Not on file   Tobacco Use   • Smoking status: Former Smoker   • Smokeless tobacco: Never Used   • Tobacco comment: quit 10 years ago   Substance and Sexual Activity   • Alcohol use: No   • Drug use: No   • Sexual activity: Yes     Partners: Male     Comment: last pap smear 10/29/20 negative, HPV negative      GENETIC SCREENING:  Age >36 yo as of ALEXEY: yes   Thalassemia: no  NTD: no  CHD: no  Down Syndrome/MR/Fragile X/Autism: no  Ash[employer]azi [employer] with Parrish-Sachs, [place], familial dysautonomia: no  Sickle cell disease or trait: no  Hemophilia: no  Muscular dystrophy: no  Cystic fibrosis: no  Juan's chorea: no  Birth defects: no  Genetic/chromosomal disorders: no    INFECTION HISTORY:  TB exposure: no  HSV: no  Illness since LMP: no  Prior GBS infected child: no  STIs: no    ALLERGIES:  No Known Allergies    MEDICATIONS:  Prior to Admission medications    Medication Sig Start Date End Date Taking? Authorizing Provider   doxylamine-pyridoxine (Diclegis) 10-10 MG tablet delayed-release EC tablet Take 2 tablets at bedtime take 1 tablet in the morning as needed for nausea/vomitting 8/11/21  Yes Madina Tolbert APRN   Prenatal Vit-Fe Fumarate-FA (PRENATAL, CLASSIC, VITAMIN) 28-0.8 MG tablet tablet Take  by mouth Daily.   Yes Provider, MD Jose Antonio   busPIRone (BUSPAR) 15 MG tablet Take 1 tablet by mouth 2 (Two) Times a Day As Needed (anxiety). 8/20/21   Jennifer Shi APRN       PHYSICAL EXAM:   BP 98/62   Wt 63 kg (139 lb)   LMP 06/26/2021 (Approximate)   BMI 24.62 kg/m²   General: Alert, healthy, no distress, well nourished and well developed.  Neurologic: Alert, oriented to person, place, and time.  Gait normal.  Cranial nerves II-XII grossly  intact.  HEENT: Normocephalic, atraumatic.  Extraocular muscles intact, pupils equal and reactive x2.    Teeth: Normal hygiene.  Neck: Supple, no adenopathy, thyroid normal size, non-tender, without nodularity, trachea midline.  Breasts: No masses, skin dimpling, skin retraction, nipple discharge, or asymmetry bilaterally.  Lungs: Normal respiratory effort.  Clear to auscultation bilaterally.  No wheezes, rhonci, or rales.  Heart: Regular rate and rhythm.  No murmer, rub or gallop.  Abdomen: Soft, non-tender, non-distended,no masses, no hepatosplenomegaly, no hernia.  Skin: No rash, no lesions.  Extremities: No cyanosis, clubbing or edema.      Prelim dating scan reviewed with pt and : Single IUP with  bpm, CRL 8w0d c/w LMP, gestational sac wnl adjacent small structure probable fibroid 1.7 cm, yolk sac wnl, bilateral ovaries wnl    IMPRESSION:  Zahra Gasca is a 36 y.o.  at 7w6d for a new prenatal visit.    PLAN:  1.  IUP at 7w6d  - Options counseling performed and patient desires continuation of pregnancy to term   - Prenatal labs ordered  - Genetic testing, including cystic fibrosis, was discussed and patient desires, dicussed cost  - Continue prenatal vitamins  - Weight gain counseling performed.   - Pregravid BMI 18.5-24.9: Recommend 25-35 lb  - Return to clinic in 4 weeks for return prenatal visit  - Reviewed COVID-19 visitation policy  - Reviewed COVID-19 precautions     Diagnosis Plan   1. 7 weeks gestation of pregnancy     2. Supervision of high risk elderly multigravida in first trimester     3. Anxiety  RBA busPIRone (BUSPAR) 15 MG tablet   4. Nausea and vomiting during pregnancy prior to 22 weeks gestation  Small frequent meals   5. History of pre-eclampsia in prior pregnancy, currently pregnant in first trimester  Baseline labs ordered      JOANNA Simmons  2021  10:02 CDT

## 2021-08-24 ENCOUNTER — TELEPHONE (OUTPATIENT)
Dept: OBSTETRICS AND GYNECOLOGY | Facility: CLINIC | Age: 36
End: 2021-08-24

## 2021-08-24 NOTE — TELEPHONE ENCOUNTER
New rx for brand diclegis only sent to [facility] per pt request pharmacy also notified that PA has been approved for brand name. Patient notified

## 2021-09-08 ENCOUNTER — OFFICE VISIT (OUTPATIENT)
Dept: FAMILY MEDICINE CLINIC | Facility: CLINIC | Age: 36
End: 2021-09-08

## 2021-09-08 VITALS
SYSTOLIC BLOOD PRESSURE: 110 MMHG | DIASTOLIC BLOOD PRESSURE: 70 MMHG | HEIGHT: 63 IN | OXYGEN SATURATION: 98 % | WEIGHT: 138 LBS | BODY MASS INDEX: 24.45 KG/M2 | HEART RATE: 91 BPM

## 2021-09-08 DIAGNOSIS — Z76.89 ENCOUNTER TO ESTABLISH CARE: ICD-10-CM

## 2021-09-08 DIAGNOSIS — O09.291 HISTORY OF PRE-ECLAMPSIA IN PRIOR PREGNANCY, CURRENTLY PREGNANT IN FIRST TRIMESTER: ICD-10-CM

## 2021-09-08 DIAGNOSIS — F41.9 ANXIETY: Primary | ICD-10-CM

## 2021-09-08 PROCEDURE — 99212 OFFICE O/P EST SF 10 MIN: CPT | Performed by: FAMILY MEDICINE

## 2021-09-08 NOTE — PROGRESS NOTES
Chief Complaint  Establish Care and Anxiety    Subjective          Zahra Gasca presents to [facility] - [place]  History of Present Illness    Patient presents today to establish care.  Has chronic medical problems including anxiety and history of severe preeclampsia.  Patient has been on a lot of different medications for anxiety in the remote past.  She is not on anything at this time.  Patient is pregnant currently, 10w4d.  She has appointment with OB/GYN for prenatal visit.  This was a planned pregnancy.  Patient has 14-month-old girl at home, severe preeclampsia with first pregnancy.  She is having nausea, and taking Diclegis at this time.    Past Medical History:   Diagnosis Date   • Abnormal Pap smear of cervix    • Anxiety    • History of pre-eclampsia 2020    w/ severe features, pulmonary edema   • Preeclampsia    • Varicella      Past Surgical History:   Procedure Laterality Date   •  SECTION N/A 2020    Procedure:  SECTION PRIMARY;  Surgeon: Alysha Curran MD;  Location: [facility] LABOR DELIVERY;  Service: Obstetrics;  Laterality: N/A;     Family History   Problem Relation Age of Onset   • Heart disease Father    • Diabetes Father    • Heart attack Father    • Coronary artery disease Father    • No Known Problems Brother    • Cancer Paternal Grandfather    • Heart disease Maternal Grandfather    • Heart attack Maternal Grandfather    • No Known Problems Daughter      Social History     Socioeconomic History   • Marital status:      Spouse name: Not on file   • Number of children: Not on file   • Years of education: Not on file   • Highest education level: Not on file   Tobacco Use   • Smoking status: Former Smoker   • Smokeless tobacco: Never Used   • Tobacco comment: quit 10 years ago   Substance and Sexual Activity   • Alcohol use: No   • Drug use: No   • Sexual activity: Yes     Partners: Male      "Comment: last pap smear 10/29/20 negative, HPV negative      Current Outpatient Medications on File Prior to Visit   Medication Sig Dispense Refill   • doxylamine-pyridoxine (Diclegis) 10-10 MG tablet delayed-release EC tablet Take 2 tablets at bedtime take 1 tablet in the morning as needed for nausea/vomitting 60 tablet 6   • Prenatal Vit-Fe Fumarate-FA (PRENATAL, CLASSIC, VITAMIN) 28-0.8 MG tablet tablet Take  by mouth Daily.       No current facility-administered medications on file prior to visit.     No Known Allergies     Objective   Vital Signs:   /70   Pulse 91   Ht 160 cm (63\")   Wt 62.6 kg (138 lb)   SpO2 98%   BMI 24.45 kg/m²     Physical Exam  Vitals reviewed.   Constitutional:       General: She is not in acute distress.     Appearance: She is well-developed.   Cardiovascular:      Rate and Rhythm: Normal rate and regular rhythm.      Heart sounds: Normal heart sounds. No murmur heard.     Pulmonary:      Effort: Pulmonary effort is normal. No respiratory distress.      Breath sounds: Normal breath sounds. No wheezing or rales.   Abdominal:      Palpations: Abdomen is soft.      Tenderness: There is no abdominal tenderness.   Skin:     General: Skin is warm and dry.      Findings: No rash.   Neurological:      Mental Status: She is alert and oriented to person, place, and time.        Result Review :   The following data was reviewed by: Niecy Butler MD on 09/08/2021:  Common labs    Common Labsle 8/4/21 8/4/21    0909 0909   Glucose  94   BUN  8   Creatinine  0.87   eGFR Non African Am  74   Sodium  136   Potassium  3.9   Chloride  102   Calcium  9.4   Albumin  4.80   Total Bilirubin  0.3   Alkaline Phosphatase  83   AST (SGOT)  16   ALT (SGPT)  11   WBC 6.41    Hemoglobin 14.7    Hematocrit 42.4    Platelets 276                      Assessment and Plan    Diagnoses and all orders for this visit:    1. Anxiety (Primary)  -     Ambulatory Referral to Psychology    2. History of " pre-eclampsia in prior pregnancy, currently pregnant in first trimester    3. Encounter to establish care      Patient seen today to establish care.  Having anxiety symptoms.  Treatment options including nonpharmacologic and pharmacologic options discussed.  Patient would like referral to psychology for initial treatment.  Medication can be used if needed.  Patient has OB/GYN follow-up for pregnancy.  She will continue healthy eating regular physical activity.            Follow Up   Return in about 4 months (around 1/8/2022).  Patient was given instructions and counseling regarding her condition or for health maintenance advice. Please see specific information pulled into the AVS if appropriate.           This document has been electronically signed by Niecy Butler MD

## 2021-09-10 ENCOUNTER — LAB (OUTPATIENT)
Dept: LAB | Facility: HOSPITAL | Age: 36
End: 2021-09-10

## 2021-09-10 DIAGNOSIS — Z13.79 GENETIC TESTING: ICD-10-CM

## 2021-09-10 DIAGNOSIS — O09.299 HX OF PREECLAMPSIA, PRIOR PREGNANCY, CURRENTLY PREGNANT: ICD-10-CM

## 2021-09-10 DIAGNOSIS — O09.529 ANTEPARTUM MULTIGRAVIDA OF ADVANCED MATERNAL AGE: ICD-10-CM

## 2021-09-10 LAB — PROT 24H UR-MRATE: NORMAL G/(24.H)

## 2021-09-10 PROCEDURE — 81050 URINALYSIS VOLUME MEASURE: CPT

## 2021-09-10 PROCEDURE — 84156 ASSAY OF PROTEIN URINE: CPT

## 2021-09-14 ENCOUNTER — LAB (OUTPATIENT)
Dept: LAB | Facility: HOSPITAL | Age: 36
End: 2021-09-14

## 2021-09-14 ENCOUNTER — ROUTINE PRENATAL (OUTPATIENT)
Dept: OBSTETRICS AND GYNECOLOGY | Facility: CLINIC | Age: 36
End: 2021-09-14

## 2021-09-14 VITALS — WEIGHT: 140.8 LBS | SYSTOLIC BLOOD PRESSURE: 104 MMHG | BODY MASS INDEX: 24.94 KG/M2 | DIASTOLIC BLOOD PRESSURE: 62 MMHG

## 2021-09-14 DIAGNOSIS — O21.9 NAUSEA AND VOMITING DURING PREGNANCY PRIOR TO 22 WEEKS GESTATION: ICD-10-CM

## 2021-09-14 DIAGNOSIS — O34.219 PREVIOUS CESAREAN DELIVERY AFFECTING PREGNANCY: ICD-10-CM

## 2021-09-14 DIAGNOSIS — O09.91 SUPERVISION OF HIGH RISK PREGNANCY IN FIRST TRIMESTER: Primary | ICD-10-CM

## 2021-09-14 DIAGNOSIS — Z87.59 HISTORY OF SEVERE PRE-ECLAMPSIA: ICD-10-CM

## 2021-09-14 DIAGNOSIS — F41.9 ANXIETY: ICD-10-CM

## 2021-09-14 DIAGNOSIS — Z3A.11 11 WEEKS GESTATION OF PREGNANCY: ICD-10-CM

## 2021-09-14 DIAGNOSIS — O09.521 MULTIGRAVIDA OF ADVANCED MATERNAL AGE IN FIRST TRIMESTER: ICD-10-CM

## 2021-09-14 PROCEDURE — 0502F SUBSEQUENT PRENATAL CARE: CPT | Performed by: OBSTETRICS & GYNECOLOGY

## 2021-09-14 RX ORDER — ASPIRIN 81 MG/1
81 TABLET ORAL DAILY
Qty: 30 TABLET | Refills: 9 | Status: ON HOLD | OUTPATIENT
Start: 2021-09-14 | End: 2022-03-07

## 2021-09-14 NOTE — PROGRESS NOTES
CC: Prenatal visit    Zahra Gasca is a 36 y.o.  at 11w3d.  Doing well.  Denies contractions, LOF, or VB.  She has had occasional cramping.  Plans for repeat  and sterilization, either her or her .    /62   Wt 63.9 kg (140 lb 12.8 oz)   LMP 2021 (Approximate)   BMI 24.94 kg/m²   Fetal Heart Rate: 150s     Problems (from 21 to present)     Problem Noted Resolved    Previous  delivery affecting pregnancy 2021 by Alysha Curran MD No    Overview Signed 2021 10:05 AM by Alysha Curran MD     LTCS x1 (secondary to preE w/ pulmonary edema)         Supervision of high risk pregnancy in first trimester 2021 by Alysha Curran MD No    Multigravida of advanced maternal age in first trimester 2021 by Jennifer Shi APRN No    Anxiety 2021 by Jennifer Shi APRN No    Nausea and vomiting during pregnancy prior to 22 weeks gestation 2021 by Jennifer hSi APRN No    History of severe pre-eclampsia 2021 by Jennifer Shi APRN No    Overview Signed 2021 10:06 AM by Alysha Curran MD     Severe preE w/ pulmonary edema  Baseline labs WNL             A/P: Zahra Gasca is a 36 y.o.  at 11w3d.  - RTC in 4 weeks  - Start ASA due to h/o preeclampsia  - Discussed COVID vaccine  - Reviewed COVID-19 visitation policy  - Reviewed COVID-19 precautions     Diagnosis Plan   1. Supervision of high risk pregnancy in first trimester     2. Multigravida of advanced maternal age in first trimester     3. Nausea and vomiting during pregnancy prior to 22 weeks gestation     4. History of severe pre-eclampsia  aspirin (aspirin) 81 MG EC tablet   5. Anxiety     6. Previous  delivery affecting pregnancy     7. 11 weeks gestation of pregnancy       Alysha Curran MD  2021  10:23 CDT

## 2021-09-26 ENCOUNTER — PATIENT MESSAGE (OUTPATIENT)
Dept: FAMILY MEDICINE CLINIC | Facility: CLINIC | Age: 36
End: 2021-09-26

## 2021-10-11 ENCOUNTER — TELEPHONE (OUTPATIENT)
Dept: OBSTETRICS AND GYNECOLOGY | Facility: CLINIC | Age: 36
End: 2021-10-11

## 2021-10-11 NOTE — TELEPHONE ENCOUNTER
"Patient called the office stating that \"she cannot afford to come to an appointment to just be told her concerns are apart of normal pregnancy and be charged hundreds of dollars when there is nothing wrong with me.\"  Patient would like to just be given an order for an ultrasound and states \"she will start her appointments at the end when she knows its really important\".  I told patient that our recommendations during pregnancy is to have routine prenatal visits.  They are done on a schedule so that we know appropriate follow up for each patient.  She states \"We do not have the money for this.  We do not qualify for viviana and my  and I are struggling and that this is ridiculous.\" I told patient that she is not being forced to do anything we are only giving her our recommendations for follow up but it is at her decreation what she does. Patient verbalized understanding and asked to reschedule her appointment for the 18th.  "

## 2021-10-18 ENCOUNTER — TELEPHONE (OUTPATIENT)
Dept: OBSTETRICS AND GYNECOLOGY | Facility: CLINIC | Age: 36
End: 2021-10-18

## 2021-10-18 ENCOUNTER — ROUTINE PRENATAL (OUTPATIENT)
Dept: OBSTETRICS AND GYNECOLOGY | Facility: CLINIC | Age: 36
End: 2021-10-18

## 2021-10-18 VITALS — WEIGHT: 142.6 LBS | SYSTOLIC BLOOD PRESSURE: 124 MMHG | DIASTOLIC BLOOD PRESSURE: 72 MMHG | BODY MASS INDEX: 25.26 KG/M2

## 2021-10-18 DIAGNOSIS — O09.522 MULTIGRAVIDA OF ADVANCED MATERNAL AGE IN SECOND TRIMESTER: ICD-10-CM

## 2021-10-18 DIAGNOSIS — Z3A.16 16 WEEKS GESTATION OF PREGNANCY: ICD-10-CM

## 2021-10-18 DIAGNOSIS — Z87.59 HISTORY OF SEVERE PRE-ECLAMPSIA: ICD-10-CM

## 2021-10-18 DIAGNOSIS — F41.9 ANXIETY DURING PREGNANCY: ICD-10-CM

## 2021-10-18 DIAGNOSIS — O21.9 NAUSEA AND VOMITING DURING PREGNANCY PRIOR TO 22 WEEKS GESTATION: ICD-10-CM

## 2021-10-18 DIAGNOSIS — O34.219 PREVIOUS CESAREAN DELIVERY AFFECTING PREGNANCY: ICD-10-CM

## 2021-10-18 DIAGNOSIS — Z36.89 ENCOUNTER FOR FETAL ANATOMIC SURVEY: ICD-10-CM

## 2021-10-18 DIAGNOSIS — O09.92 SUPERVISION OF HIGH RISK PREGNANCY IN SECOND TRIMESTER: Primary | ICD-10-CM

## 2021-10-18 DIAGNOSIS — O99.340 ANXIETY DURING PREGNANCY: ICD-10-CM

## 2021-10-18 PROCEDURE — 0502F SUBSEQUENT PRENATAL CARE: CPT | Performed by: OBSTETRICS & GYNECOLOGY

## 2021-10-18 NOTE — TELEPHONE ENCOUNTER
"Patient seen for prenatal appointment today.  Upon triaging patient in the room she states that \"this is a waste of time and she didn't even want to come today\"  Patient  states she is upset because  staff reminded them of Covid visitor policy which includes no children in office and \"this is the 3rd time they have been here with their child and they have no .\"  Let them know I did not see any documentation in her chart regarding bringing a visitor and her child for every visit but I would speak with the .    Spoke with Joyce and patient was given permission for this visit but will not be allowed to bring child to future visits.  Dr Curran has spoken with patient and is aware of this for future appointments.  "

## 2021-10-18 NOTE — PROGRESS NOTES
CC: Prenatal visit    Zahra Gasca is a 36 y.o.  at 16w2d.  Doing well.  Denies contractions, LOF, or VB.  Reports nausea is controlled as long as she takes Diclegis.    /72   Wt 64.7 kg (142 lb 9.6 oz)   LMP 2021 (Approximate)   BMI 25.26 kg/m²   Fetal Heart Rate: 140s     Problems (from 21 to present)     Problem Noted Resolved    Previous  delivery affecting pregnancy 2021 by Alysha Curran MD No    Overview Signed 2021 10:05 AM by Alysha Curran MD     LTCS x1 (secondary to preE w/ pulmonary edema)         Supervision of high risk pregnancy in second trimester 2021 by Alysha Curran MD No    Multigravida of advanced maternal age in second trimester 2021 by Jennifer Shi APRN No    Anxiety during pregnancy 2021 by Jennifer Shi APRN No    Nausea and vomiting during pregnancy prior to 22 weeks gestation 2021 by Jennifer Shi APRN No    History of severe pre-eclampsia 2021 by Jennifer Shi APRN No    Overview Signed 2021 10:06 AM by Alysha Curran MD     Severe preE w/ pulmonary edema  Baseline labs WNL             A/P: Zahra Gasca is a 36 y.o.  at 16w2d.  - RTC in 4 weeks  - Anatomy US scheduled for 20 weeks  - Reviewed importance of prenatal care especially she developed preE w/ severe features w/ flash pulmonary edema  - Reviewed visitor policy.  Discussed that her last few visits, we have made an exception and going forward she can only have 1 person.  Discussed that if she can't find childcare, then she can bring her child but not her .  Reviewed COVID implications and our attempts to keep patients and staff alike safe.  - Reviewed COVID-19 visitation policy  - Reviewed COVID-19 precautions     Diagnosis Plan   1. Supervision of high risk pregnancy in second trimester     2. Previous   delivery affecting pregnancy     3. Multigravida of advanced maternal age in second trimester     4. Nausea and vomiting during pregnancy prior to 22 weeks gestation     5. History of severe pre-eclampsia     6. Anxiety during pregnancy     7. 16 weeks gestation of pregnancy     8. Encounter for fetal anatomic survey  US Ob 14 + Weeks Single or First Gestation     Alysha Currna MD  10/18/2021  09:59 CDT

## 2021-10-19 ENCOUNTER — TELEPHONE (OUTPATIENT)
Dept: OBSTETRICS AND GYNECOLOGY | Facility: CLINIC | Age: 36
End: 2021-10-19

## 2021-10-21 ENCOUNTER — TELEPHONE (OUTPATIENT)
Dept: OBSTETRICS AND GYNECOLOGY | Facility: CLINIC | Age: 36
End: 2021-10-21

## 2021-10-21 NOTE — TELEPHONE ENCOUNTER
Did explain to Mr. Gasca, also, that if they required special arrangements for their child for regular prenatal appointments, it would require a phone call for permission ahead of the visit.

## 2021-10-21 NOTE — TELEPHONE ENCOUNTER
Spoke with patient's , Jamarcus, today regarding last visit.  Explained to him the visitor policy of no children, and explained that rude behavior said to be shown by staff would be addressed accordingly.  Did give permission for child to be present for ultrasound visits, in order for him to be present as well.  He stated understanding.

## 2021-11-15 PROCEDURE — 87635 SARS-COV-2 COVID-19 AMP PRB: CPT | Performed by: NURSE PRACTITIONER

## 2021-11-17 PROBLEM — O44.02 PLACENTA PREVIA IN SECOND TRIMESTER: Status: ACTIVE | Noted: 2021-11-17

## 2021-11-18 ENCOUNTER — APPOINTMENT (OUTPATIENT)
Dept: ULTRASOUND IMAGING | Facility: HOSPITAL | Age: 36
End: 2021-11-18

## 2021-11-18 ENCOUNTER — HOSPITAL ENCOUNTER (OUTPATIENT)
Facility: HOSPITAL | Age: 36
Setting detail: OBSERVATION
Discharge: HOME | End: 2021-11-18
Attending: OBSTETRICS & GYNECOLOGY | Admitting: OBSTETRICS & GYNECOLOGY

## 2021-11-18 ENCOUNTER — HOSPITAL ENCOUNTER (OUTPATIENT)
Facility: HOSPITAL | Age: 36
End: 2021-11-18
Attending: OBSTETRICS & GYNECOLOGY | Admitting: OBSTETRICS & GYNECOLOGY

## 2021-11-18 VITALS
RESPIRATION RATE: 18 BRPM | TEMPERATURE: 98.3 F | SYSTOLIC BLOOD PRESSURE: 107 MMHG | HEART RATE: 76 BPM | OXYGEN SATURATION: 98 % | DIASTOLIC BLOOD PRESSURE: 70 MMHG

## 2021-11-18 PROBLEM — N20.0 NEPHROLITHIASIS: Status: ACTIVE | Noted: 2021-11-18

## 2021-11-18 LAB
ABO GROUP BLD: NORMAL
ALBUMIN SERPL-MCNC: 3.4 G/DL (ref 3.5–5.2)
ALBUMIN/GLOB SERPL: 1.3 G/DL
ALP SERPL-CCNC: 78 U/L (ref 39–117)
ALT SERPL W P-5'-P-CCNC: 7 U/L (ref 1–33)
ANION GAP SERPL CALCULATED.3IONS-SCNC: 7 MMOL/L (ref 5–15)
AST SERPL-CCNC: 13 U/L (ref 1–32)
BACTERIA UR QL AUTO: ABNORMAL /HPF
BASOPHILS # BLD AUTO: 0.01 10*3/MM3 (ref 0–0.2)
BASOPHILS NFR BLD AUTO: 0.1 % (ref 0–1.5)
BILIRUB SERPL-MCNC: 0.2 MG/DL (ref 0–1.2)
BILIRUB UR QL STRIP: ABNORMAL
BLD GP AB SCN SERPL QL: NEGATIVE
BUN SERPL-MCNC: 8 MG/DL (ref 6–20)
BUN/CREAT SERPL: 13.1 (ref 7–25)
CALCIUM SPEC-SCNC: 8.7 MG/DL (ref 8.6–10.5)
CHLORIDE SERPL-SCNC: 105 MMOL/L (ref 98–107)
CLARITY UR: ABNORMAL
CO2 SERPL-SCNC: 25 MMOL/L (ref 22–29)
COLOR UR: YELLOW
CREAT SERPL-MCNC: 0.61 MG/DL (ref 0.57–1)
DEPRECATED RDW RBC AUTO: 42.7 FL (ref 37–54)
EOSINOPHIL # BLD AUTO: 0.03 10*3/MM3 (ref 0–0.4)
EOSINOPHIL NFR BLD AUTO: 0.3 % (ref 0.3–6.2)
ERYTHROCYTE [DISTWIDTH] IN BLOOD BY AUTOMATED COUNT: 14.3 % (ref 12.3–15.4)
GFR SERPL CREATININE-BSD FRML MDRD: 111 ML/MIN/1.73
GLOBULIN UR ELPH-MCNC: 2.6 GM/DL
GLUCOSE SERPL-MCNC: 139 MG/DL (ref 65–99)
GLUCOSE UR STRIP-MCNC: NEGATIVE MG/DL
HCT VFR BLD AUTO: 33.5 % (ref 34–46.6)
HGB BLD-MCNC: 11.4 G/DL (ref 12–15.9)
HGB UR QL STRIP.AUTO: ABNORMAL
HYALINE CASTS UR QL AUTO: ABNORMAL /LPF
IMM GRANULOCYTES # BLD AUTO: 0.07 10*3/MM3 (ref 0–0.05)
IMM GRANULOCYTES NFR BLD AUTO: 0.6 % (ref 0–0.5)
KETONES UR QL STRIP: ABNORMAL
LEUKOCYTE ESTERASE UR QL STRIP.AUTO: NEGATIVE
LYMPHOCYTES # BLD AUTO: 1.2 10*3/MM3 (ref 0.7–3.1)
LYMPHOCYTES NFR BLD AUTO: 10.4 % (ref 19.6–45.3)
Lab: NORMAL
MCH RBC QN AUTO: 28.1 PG (ref 26.6–33)
MCHC RBC AUTO-ENTMCNC: 34 G/DL (ref 31.5–35.7)
MCV RBC AUTO: 82.5 FL (ref 79–97)
MONOCYTES # BLD AUTO: 0.28 10*3/MM3 (ref 0.1–0.9)
MONOCYTES NFR BLD AUTO: 2.4 % (ref 5–12)
NEUTROPHILS NFR BLD AUTO: 86.2 % (ref 42.7–76)
NEUTROPHILS NFR BLD AUTO: 9.96 10*3/MM3 (ref 1.7–7)
NITRITE UR QL STRIP: NEGATIVE
NRBC BLD AUTO-RTO: 0 /100 WBC (ref 0–0.2)
PH UR STRIP.AUTO: 6 [PH] (ref 5–9)
PLATELET # BLD AUTO: 206 10*3/MM3 (ref 140–450)
PMV BLD AUTO: 10.2 FL (ref 6–12)
POTASSIUM SERPL-SCNC: 4.3 MMOL/L (ref 3.5–5.2)
PROT SERPL-MCNC: 6 G/DL (ref 6–8.5)
PROT UR QL STRIP: ABNORMAL
RBC # BLD AUTO: 4.06 10*6/MM3 (ref 3.77–5.28)
RBC # UR STRIP: ABNORMAL /HPF
REF LAB TEST METHOD: ABNORMAL
RH BLD: NEGATIVE
SODIUM SERPL-SCNC: 137 MMOL/L (ref 136–145)
SP GR UR STRIP: 1.03 (ref 1–1.03)
SQUAMOUS #/AREA URNS HPF: ABNORMAL /HPF
T&S EXPIRATION DATE: NORMAL
UROBILINOGEN UR QL STRIP: ABNORMAL
WBC # UR STRIP: ABNORMAL /HPF
WBC NRBC COR # BLD: 11.55 10*3/MM3 (ref 3.4–10.8)

## 2021-11-18 PROCEDURE — 25010000002 HYDROMORPHONE 1 MG/ML SOLUTION: Performed by: OBSTETRICS & GYNECOLOGY

## 2021-11-18 PROCEDURE — 86900 BLOOD TYPING SEROLOGIC ABO: CPT | Performed by: OBSTETRICS & GYNECOLOGY

## 2021-11-18 PROCEDURE — 76775 US EXAM ABDO BACK WALL LIM: CPT

## 2021-11-18 PROCEDURE — 81001 URINALYSIS AUTO W/SCOPE: CPT | Performed by: OBSTETRICS & GYNECOLOGY

## 2021-11-18 PROCEDURE — 96375 TX/PRO/DX INJ NEW DRUG ADDON: CPT

## 2021-11-18 PROCEDURE — 99235 HOSP IP/OBS SAME DATE MOD 70: CPT | Performed by: OBSTETRICS & GYNECOLOGY

## 2021-11-18 PROCEDURE — 87086 URINE CULTURE/COLONY COUNT: CPT | Performed by: OBSTETRICS & GYNECOLOGY

## 2021-11-18 PROCEDURE — 25010000002 ONDANSETRON PER 1 MG: Performed by: OBSTETRICS & GYNECOLOGY

## 2021-11-18 PROCEDURE — 85025 COMPLETE CBC W/AUTO DIFF WBC: CPT | Performed by: OBSTETRICS & GYNECOLOGY

## 2021-11-18 PROCEDURE — G0378 HOSPITAL OBSERVATION PER HR: HCPCS

## 2021-11-18 PROCEDURE — 96365 THER/PROPH/DIAG IV INF INIT: CPT

## 2021-11-18 PROCEDURE — 86850 RBC ANTIBODY SCREEN: CPT | Performed by: OBSTETRICS & GYNECOLOGY

## 2021-11-18 PROCEDURE — 80053 COMPREHEN METABOLIC PANEL: CPT | Performed by: OBSTETRICS & GYNECOLOGY

## 2021-11-18 PROCEDURE — 86901 BLOOD TYPING SEROLOGIC RH(D): CPT | Performed by: OBSTETRICS & GYNECOLOGY

## 2021-11-18 RX ORDER — SODIUM CHLORIDE 0.9 % (FLUSH) 0.9 %
3-10 SYRINGE (ML) INJECTION AS NEEDED
Status: DISCONTINUED | OUTPATIENT
Start: 2021-11-18 | End: 2021-11-18 | Stop reason: HOSPADM

## 2021-11-18 RX ORDER — ACETAMINOPHEN 500 MG
1000 TABLET ORAL EVERY 6 HOURS PRN
Status: DISCONTINUED | OUTPATIENT
Start: 2021-11-18 | End: 2021-11-18 | Stop reason: HOSPADM

## 2021-11-18 RX ORDER — SODIUM CHLORIDE 0.9 % (FLUSH) 0.9 %
10 SYRINGE (ML) INJECTION EVERY 12 HOURS SCHEDULED
Status: DISCONTINUED | OUTPATIENT
Start: 2021-11-18 | End: 2021-11-18 | Stop reason: HOSPADM

## 2021-11-18 RX ORDER — TAMSULOSIN HYDROCHLORIDE 0.4 MG/1
0.4 CAPSULE ORAL DAILY
Status: DISCONTINUED | OUTPATIENT
Start: 2021-11-18 | End: 2021-11-18 | Stop reason: HOSPADM

## 2021-11-18 RX ORDER — OXYCODONE HYDROCHLORIDE 5 MG/1
5 TABLET ORAL EVERY 4 HOURS PRN
Status: DISCONTINUED | OUTPATIENT
Start: 2021-11-18 | End: 2021-11-18 | Stop reason: HOSPADM

## 2021-11-18 RX ORDER — SODIUM CHLORIDE, SODIUM LACTATE, POTASSIUM CHLORIDE, CALCIUM CHLORIDE 600; 310; 30; 20 MG/100ML; MG/100ML; MG/100ML; MG/100ML
125 INJECTION, SOLUTION INTRAVENOUS CONTINUOUS
Status: DISCONTINUED | OUTPATIENT
Start: 2021-11-18 | End: 2021-11-18 | Stop reason: HOSPADM

## 2021-11-18 RX ORDER — LIDOCAINE HYDROCHLORIDE 10 MG/ML
5 INJECTION, SOLUTION EPIDURAL; INFILTRATION; INTRACAUDAL; PERINEURAL AS NEEDED
Status: DISCONTINUED | OUTPATIENT
Start: 2021-11-18 | End: 2021-11-18 | Stop reason: HOSPADM

## 2021-11-18 RX ORDER — SODIUM CHLORIDE 0.9 % (FLUSH) 0.9 %
3 SYRINGE (ML) INJECTION EVERY 12 HOURS SCHEDULED
Status: DISCONTINUED | OUTPATIENT
Start: 2021-11-18 | End: 2021-11-18 | Stop reason: HOSPADM

## 2021-11-18 RX ORDER — SODIUM CHLORIDE 0.9 % (FLUSH) 0.9 %
10 SYRINGE (ML) INJECTION AS NEEDED
Status: DISCONTINUED | OUTPATIENT
Start: 2021-11-18 | End: 2021-11-18 | Stop reason: HOSPADM

## 2021-11-18 RX ORDER — ONDANSETRON HCL IN 0.9 % NACL 8 MG/50 ML
8 INTRAVENOUS SOLUTION, PIGGYBACK (ML) INTRAVENOUS ONCE
Status: COMPLETED | OUTPATIENT
Start: 2021-11-18 | End: 2021-11-18

## 2021-11-18 RX ADMIN — TAMSULOSIN HYDROCHLORIDE 0.4 MG: 0.4 CAPSULE ORAL at 08:40

## 2021-11-18 RX ADMIN — ACETAMINOPHEN 1000 MG: 500 TABLET, FILM COATED ORAL at 02:37

## 2021-11-18 RX ADMIN — SODIUM CHLORIDE, POTASSIUM CHLORIDE, SODIUM LACTATE AND CALCIUM CHLORIDE 125 ML/HR: 600; 310; 30; 20 INJECTION, SOLUTION INTRAVENOUS at 03:49

## 2021-11-18 RX ADMIN — SODIUM CHLORIDE, POTASSIUM CHLORIDE, SODIUM LACTATE AND CALCIUM CHLORIDE 1000 ML: 600; 310; 30; 20 INJECTION, SOLUTION INTRAVENOUS at 02:08

## 2021-11-18 RX ADMIN — HYDROMORPHONE HYDROCHLORIDE 0.5 MG: 1 INJECTION, SOLUTION INTRAMUSCULAR; INTRAVENOUS; SUBCUTANEOUS at 02:13

## 2021-11-18 RX ADMIN — SODIUM CHLORIDE, PRESERVATIVE FREE 10 ML: 5 INJECTION INTRAVENOUS at 08:41

## 2021-11-18 RX ADMIN — SODIUM CHLORIDE, POTASSIUM CHLORIDE, SODIUM LACTATE AND CALCIUM CHLORIDE 1000 ML: 600; 310; 30; 20 INJECTION, SOLUTION INTRAVENOUS at 03:00

## 2021-11-18 RX ADMIN — ONDANSETRON 8 MG: 2 INJECTION INTRAMUSCULAR; INTRAVENOUS at 02:37

## 2021-11-18 NOTE — PLAN OF CARE
Problem: Adult Inpatient Plan of Care  Goal: Plan of Care Review  Outcome: Ongoing, Progressing  Flowsheets (Taken 11/18/2021 5823)  Progress: improving  Plan of Care Reviewed With: patient  Outcome Summary: One time dilaudid helped pt's pain and prn tylenol. VSS. 2L bolus infused and zofran piggyback. Pt resting at this time. Renal US for this morning.   Goal Outcome Evaluation:  Plan of Care Reviewed With: patient        Progress: improving  Outcome Summary: One time dilaudid helped pt's pain and prn tylenol. VSS. 2L bolus infused and zofran piggyback. Pt resting at this time. Renal US for this morning.

## 2021-11-18 NOTE — DISCHARGE SUMMARY
[facility] [place]  Zahra Gasca  : 1985  MRN: [id number]  CSN: [id number]    Discharge Summary      Date of Admission: 2021   Date of Discharge: 2021   Discharge Diagnosis: 1.  Left nephrolithiasis   Procedures Performed:       Hospital Course:  Patient presented to labor and delivery with severe left flank pain pelvic pain and nausea with vomiting.  Symptoms improved with antiemetics, pain medications.  Patient underwent pelvic ultrasound which did not show any significant left hydronephrosis.  Patient likely passed a kidney stone on her own.  Pain was significantly improved and patient was discharged to home.   Pending Studies:  Labs:   Lab Results (last 72 hours)     Procedure Component Value Units Date/Time    Comprehensive Metabolic Panel [[id number]]  (Abnormal) Collected: 21    Specimen: Blood Updated: 21     Glucose 139 mg/dL      BUN 8 mg/dL      Creatinine 0.61 mg/dL      Sodium 137 mmol/L      Potassium 4.3 mmol/L      Chloride 105 mmol/L      CO2 25.0 mmol/L      Calcium 8.7 mg/dL      Total Protein 6.0 g/dL      Albumin 3.40 g/dL      ALT (SGPT) 7 U/L      AST (SGOT) 13 U/L      Alkaline Phosphatase 78 U/L      Total Bilirubin 0.2 mg/dL      eGFR Non African Amer 111 mL/min/1.73      Globulin 2.6 gm/dL      A/G Ratio 1.3 g/dL      BUN/Creatinine Ratio 13.1     Anion Gap 7.0 mmol/L     Narrative:      GFR Normal >60  Chronic Kidney Disease <60  Kidney Failure <15      CBC & Differential [[id number]]  (Abnormal) Collected: 21    Specimen: Blood Updated: 21    Narrative:      The following orders were created for panel order CBC & Differential.  Procedure                               Abnormality         Status                     ---------                               -----------         ------                     CBC Auto Differential[[id number]]        Abnormal            Final result                 Please view results for these tests  on the individual orders.    CBC Auto Differential [[id number]]  (Abnormal) Collected: 11/18/21 0740    Specimen: Blood Updated: 11/18/21 0802     WBC 11.55 10*3/mm3      RBC 4.06 10*6/mm3      Hemoglobin 11.4 g/dL      Hematocrit 33.5 %      MCV 82.5 fL      MCH 28.1 pg      MCHC 34.0 g/dL      RDW 14.3 %      RDW-SD 42.7 fl      MPV 10.2 fL      Platelets 206 10*3/mm3      Neutrophil % 86.2 %      Lymphocyte % 10.4 %      Monocyte % 2.4 %      Eosinophil % 0.3 %      Basophil % 0.1 %      Immature Grans % 0.6 %      Neutrophils, Absolute 9.96 10*3/mm3      Lymphocytes, Absolute 1.20 10*3/mm3      Monocytes, Absolute 0.28 10*3/mm3      Eosinophils, Absolute 0.03 10*3/mm3      Basophils, Absolute 0.01 10*3/mm3      Immature Grans, Absolute 0.07 10*3/mm3      nRBC 0.0 /100 WBC     Urinalysis, Microscopic Only - Urine, Clean Catch [[id number]]  (Abnormal) Collected: 11/18/21 0115    Specimen: Urine, Clean Catch Updated: 11/18/21 0139     RBC, UA Too Numerous to Count /HPF      WBC, UA 6-12 /HPF      Bacteria, UA None Seen /HPF      Squamous Epithelial Cells, UA None Seen /HPF      Hyaline Casts, UA 3-6 /LPF      Methodology Automated Microscopy    Urine Culture - Urine, Urine, Clean Catch [[id number]] Collected: 11/18/21 0115    Specimen: Urine, Clean Catch Updated: 11/18/21 0139    Urinalysis With Culture If Indicated - Urine, Clean Catch [[id number]]  (Abnormal) Collected: 11/18/21 0115    Specimen: Urine, Clean Catch Updated: 11/18/21 0132     Color, UA Yellow     Appearance, UA Cloudy     pH, UA 6.0     Specific Gravity, UA 1.027     Comment: Result obtained by Refractometer        Glucose, UA Negative     Ketones, UA 40 mg/dL (2+)     Bilirubin, UA Small (1+)     Blood, UA Large (3+)     Protein, UA 30 mg/dL (1+)     Leuk Esterase, UA Negative     Nitrite, UA Negative     Urobilinogen, UA 1.0 E.U./dL         Condition at Discharge: Stable   Discharge Diet: Diet Instructions     Diet: Regular      Discharge Diet:  Regular         Discharge Activity: Activity Instructions     Bathing Restrictions      Type of Restriction: Bathing    Bathing Restrictions: Other    Explain Bathing Restrictions: No bathing restrictions    Driving Restrictions      Type of Restriction: Driving    Driving Restrictions: No Driving (Time Limited)    Length: Other    Indicate Length of Restriction: No driving restrictions    Lifting Restrictions      Type of Restriction: Lifting    Lifting Restrictions: Other    Explain Lifing Restrictions: No lifting more than 30 pounds during pregnancy    Pelvic Rest      Nothing in the vagina for 6 weeks to include tampons or intercourse.    Sexual Activity Restrictions      Type of Restriction: Sex    Explain Sexual Activity Restrictions: No intercourse or anything in the vagina secondary to placenta previa this will continue until cleared by OB/GYN provider         Discharge Medications:    Your medication list      CONTINUE taking these medications      Instructions Last Dose Given Next Dose Due   amoxicillin 875 MG tablet  Commonly known as: AMOXIL           aspirin 81 MG EC tablet      Take 1 tablet by mouth Daily.       doxylamine-pyridoxine 10-10 MG tablet delayed-release EC tablet  Commonly known as: Diclegis      Take 2 tablets at bedtime take 1 tablet in the morning as needed for nausea/vomitting       prenatal (CLASSIC) vitamin  tablet  Generic drug: prenatal vitamin      Take  by mouth Daily.       pseudoephedrine 30 MG tablet  Commonly known as: SUDAFED      Take 1 tablet by mouth 2 (Two) Times a Day.             Discharge Disposition: home   Follow-up: Future Appointments   Date Time Provider Department Center   11/29/2021  1:30 PM Alysha Curran MD [facility] None   1/10/2022  9:15 AM Niecy Butler MD [facility]            This note has been electronically signed.    Nilesh Logan, DO  November 18, 2021  11:14 CST

## 2021-11-18 NOTE — PLAN OF CARE
Goal Outcome Evaluation:  Plan of Care Reviewed With: patient        Progress: improving  Outcome Summary: VSS, pt denies pain at this time, MD discharging to home, urine strained, no stones noted, output adequate

## 2021-11-18 NOTE — H&P
[facility]  Obstetric History and Physical    Chief Complaint   Patient presents with   • Back Pain     started around 1900 21           Patient is a 36 y.o. female  currently at 20w5d, who presents with complaint of severe back pain.  Pain is on the left and radiates up to her back.  States that this started suddenly.  States positive fetal movement, denies any vaginal bleeding or contractions.  Patient states that she is not had anything like this previously.    Her prenatal care is complicated by previous  section, depression and anxiety     Obstetric History   # 1 - Date: 20, Sex: Female, Weight: 3380 g (7 lb 7.2 oz), GA: 40w2d, Delivery: , Low Transverse, Apgar1: 8, Apgar5: 9, Living: Living, Birth Comments: None    # 2 - Date: None, Sex: None, Weight: None, GA: None, Delivery: None, Apgar1: None, Apgar5: None, Living: None, Birth Comments: None      The following portions of the patient's history were reviewed and updated as appropriate: current medications, allergies, past medical history, past surgical history, past family history, past social history and problem list .       Prenatal Information:  Prenatal Results     POC Urine Glucose/Protein     Test Value Reference Range Date Time    Urine Glucose  Negative mg/dL Negative, 1000 mg/dL (3+) 20 1135    Urine Protein  Trace mg/dL Negative 20 1135          Initial Prenatal Labs     Test Value Reference Range Date Time    Hemoglobin  14.7 g/dL 12.0 - 15.9 21 0909    Hematocrit  42.4 % 34.0 - 46.6 21 0909    Platelets  276 10*3/mm3 140 - 450 21 0909    Rubella IgG  5.36 index Immune >0.99 21 0909    Hepatitis B SAg  Non-Reactive  Non-Reactive 21 0909    Hepatitis C Ab  Non-Reactive  Non-Reactive 21 0909    RPR  Non-Reactive  Non-Reactive 21 0909    ABO  B   21 0909    Rh  Negative   21 0909    Antibody Screen  Negative   21 0909    HIV  Non-Reactive   Non-Reactive 08/04/21 0909    Urine Culture  No growth   08/04/21 0912    Gonorrhea  Negative  Negative 08/04/21 0912    Chlamydia  Negative  Negative 08/04/21 0912    TSH        HgB A1c               2nd and 3rd Trimester     Test Value Reference Range Date Time    Hemoglobin (repeated)        Hematocrit (repeated)        Platelets   276 10*3/mm3 140 - 450 08/04/21 0909    GCT        Antibody Screen (repeated)        GTT Fasting        GTT 1 Hr        GTT 2 Hr        GTT 3 Hr  152 mg/dL 74 - 140 03/27/20 1207    Group B Strep              Drug Screening     Test Value Reference Range Date Time    Amphetamine Screen  Negative  Negative 08/04/21 0912    Barbiturate Screen  Negative  Negative 08/04/21 0912    Benzodiazepine Screen  Negative  Negative 08/04/21 0912    Methadone Screen  Negative  Negative 08/04/21 0912    Phencyclidine Screen  Negative  Negative 08/04/21 0912    Opiates Screen  Negative  Negative 08/04/21 0912    THC Screen  Negative  Negative 08/04/21 0912    Cocaine Screen  Negative  Negative 08/04/21 0912    Propoxyphene Screen  Negative  Negative 08/04/21 0912    Buprenorphine Screen  Negative  Negative 08/04/21 0912    Methamphetamine Screen  Negative  Negative 08/04/21 0912    Oxycodone Screen  Negative  Negative 08/04/21 0912    Tricyclic Antidepressants Screen  Negative  Negative 08/04/21 0912          Other (Risk screening)     Test Value Reference Range Date Time    Varicella IgG        Parvovirus IgG        CMV IgG        Cystic Fibrosis        Hemoglobin electrophoresis        NIPT        MSAFP-4        AFP (for NTD only)              Legend    ^: Historical                      External Prenatal Results     Pregnancy Outside Results - Transcribed From Office Records - See Scanned Records For Details     Test Value Date Time    ABO  B  08/04/21 0909    Rh  Negative  08/04/21 0909    Antibody Screen  Negative  08/04/21 0909    Varicella IgG       Rubella  5.36 index 08/04/21 0909    Hgb  14.7  g/dL 21 0909    Hct  42.4 % 21 0909    Glucose Fasting GTT       Glucose Tolerance Test 1 hour       Glucose Tolerance Test 3 hour  152 mg/dL 20 1207    Gonorrhea (discrete)  Negative  21 0912    Chlamydia (discrete)  Negative  21 0912    RPR  Non-Reactive  21 0909    VDRL       Syphilis Antibody       HBsAg  Non-Reactive  21 0909    Herpes Simplex Virus PCR       Herpes Simplex VIrus Culture       HIV  Non-Reactive  21 0909    Hep C RNA Quant PCR       Hep C Antibody  Non-Reactive  21 0909    AFP       Group B Strep  Positive  20 1628    GBS Susceptibility to Clindamycin       GBS Susceptibility to Erythromycin       Fetal Fibronectin       Genetic Testing, Maternal Blood             Drug Screening     Test Value Date Time    Urine Drug Screen       Amphetamine Screen  Negative  21 0912    Barbiturate Screen  Negative  21 0912    Benzodiazepine Screen  Negative  21 0912    Methadone Screen  Negative  21 0912    Phencyclidine Screen  Negative  21 0912    Opiates Screen  Negative  21 0912    THC Screen  Negative  21 0912    Cocaine Screen       Propoxyphene Screen  Negative  21 0912    Buprenorphine Screen  Negative  21 0912    Methamphetamine Screen       Oxycodone Screen  Negative  21 0912    Tricyclic Antidepressants Screen  Negative  21 0912          Legend    ^: Historical                         Past OB History:     OB History    Para Term  AB Living   2 1 1 0 0 1   SAB IAB Ectopic Molar Multiple Live Births   0 0 0 0 0 1      # Outcome Date GA Lbr Suresh/2nd Weight Sex Delivery Anes PTL Lv   2 Current            1 Term 20 40w2d  3380 g (7 lb 7.2 oz) F CS-LTranv Gen N PALAK      Complications: Pulmonary edema, Preeclampsia, severe, third trimester      Name: JOSLYN ZULETA      Apgar1: 8  Apgar5: 9        ALLERGIES:   No Known Allergies     Home Medications:     Prior  to Admission medications    Medication Sig Start Date End Date Taking? Authorizing Provider   amoxicillin (AMOXIL) 875 MG tablet  21  Yes ProviderJose Antonio MD   aspirin (aspirin) 81 MG EC tablet Take 1 tablet by mouth Daily. 21  Yes Alysha Curran MD   doxylamine-pyridoxine (Diclegis) 10-10 MG tablet delayed-release EC tablet Take 2 tablets at bedtime take 1 tablet in the morning as needed for nausea/vomitting 21  Yes Madina Tolbert APRN   Prenatal Vit-Fe Fumarate-FA (PRENATAL, CLASSIC, VITAMIN) 28-0.8 MG tablet tablet Take  by mouth Daily.   Yes ProviderJose Antonio MD   pseudoephedrine (SUDAFED) 30 MG tablet Take 1 tablet by mouth 2 (Two) Times a Day. 11/15/21  Yes Judy Mckeon APRN       Past Medical History: Past Medical History:   Diagnosis Date   • Abnormal Pap smear of cervix    • Anxiety    • History of pre-eclampsia 2020    w/ severe features, pulmonary edema   • Nephrolithiasis 2021   • Preeclampsia    • Varicella       Past Surgical History Past Surgical History:   Procedure Laterality Date   •  SECTION N/A 2020    Procedure:  SECTION PRIMARY;  Surgeon: Alysha Curran MD;  Location: [facility] LABOR DELIVERY;  Service: Obstetrics;  Laterality: N/A;      Family History: Family History   Problem Relation Age of Onset   • Heart disease Father    • Diabetes Father    • Heart attack Father    • Coronary artery disease Father    • No Known Problems Brother    • Cancer Paternal Grandfather    • Heart disease Maternal Grandfather    • Heart attack Maternal Grandfather    • No Known Problems Daughter       Social History:  reports that she has quit smoking. She has never used smokeless tobacco.   reports no history of alcohol use.   reports no history of drug use.        Review of Systems   Constitutional: Negative for chills, fatigue and fever.   HENT: Negative for sore throat.    Eyes: Negative for visual disturbance.    Respiratory: Negative for cough, shortness of breath and wheezing.    Cardiovascular: Negative for chest pain, palpitations and leg swelling.   Gastrointestinal: Negative for abdominal pain, diarrhea, nausea and vomiting.   Endocrine: Negative for cold intolerance and heat intolerance.   Genitourinary: Negative for dysuria, flank pain, frequency, menstrual problem, pelvic pain, vaginal bleeding, vaginal discharge and vaginal pain.   Skin: Negative for color change and pallor.   Neurological: Negative for syncope, light-headedness and headaches.   Psychiatric/Behavioral: Negative for dysphoric mood and suicidal ideas. The patient is not nervous/anxious.      Objective     Documented Vitals    11/18/21 0112 11/18/21 0117 11/18/21 0347   BP: 134/72  111/62   Pulse: 71  77   Resp:   16   Temp:  98.2 °F (36.8 °C) 98.3 °F (36.8 °C)   SpO2:   99%       OBGyn Exam  Constitutional: Appears to be in no acute distress; Eyes: sclera normal; Endocrine system: thyroid palpate is normal; Pulmonary system: lungs clear; Cardiovascular system: heart regular rate and rhythm; Gastrointestinal system: abdomen soft nontender, active bowel sounds; Urologic system: CVA tenderness on the left side, no tenderness on the right; Psychiatric: appropriate insight; Neurologic: gait within normal limits, fetal heart tones present.      Last Labs  Lab Results   Component Value Date    WBC 6.41 08/04/2021    RBC 5.22 08/04/2021    HGB 14.7 08/04/2021    HCT 42.4 08/04/2021    MCV 81.2 08/04/2021    MCH 28.2 08/04/2021    MCHC 34.7 08/04/2021    RDW 13.0 08/04/2021    RDWSD 37.6 08/04/2021    MPV 11.6 08/04/2021     08/04/2021        Lab Results   Component Value Date    GLUCOSE 94 08/04/2021    BUN 8 08/04/2021    CREATININE 0.87 08/04/2021     08/04/2021    K 3.9 08/04/2021     08/04/2021    CO2 22.1 08/04/2021    CALCIUM 9.4 08/04/2021    PROTEINTOT 7.5 08/04/2021    ALBUMIN 4.80 08/04/2021    ALT 11 08/04/2021    AST 16  2021    ALKPHOS 83 2021    BILITOT 0.3 2021    EGFRIFNONA 74 2021    GLOB 2.7 2021    AGRATIO 1.8 2021    BCR 9.2 2021    ANIONGAP 11.9 2021       Lab Results   Component Value Date    HCGQUAL Positive (A) 2020         Assessment/Plan:  1. 36 y.o. [id number].  With likely left nephrolithiasis.  Patient was started on pain medication and antiemetics.  We will start patient on Flomax this morning, will get renal ultrasound this morning.  Patient states that pain has improved significantly overnight likely patient did pass kidney stone.  UA was notable for significant blood no other evidence of problems.  No evidence of infection at this time with no fevers and normal heart rate.  Plan to see what renal ultrasound shows if no severe hydronephrosis and pain remains minimal will possibly be able to discharge to home today.  We will keep the patient on Flomax for approximately 1 week and have her follow-up in clinic, will have patient strain urine at this time.  Patient can have a regular diet.  Patient had recent anatomy scan which showed placenta previa.  Instructed patient on pelvic rest until she is able to be seen in our clinic.      Zahra Gasca and I have discussed pain goals for this hospitalization after reviewing her current clinical condition, medical history and prior pain experiences.  The goal is to keep her pain level tolerable.  To help achieve this, I plan to use IV and oral pain medications as needed.           This document has been electronically signed by Nilesh Logan DO on 2021 07:03 CST

## 2021-11-19 LAB — BACTERIA SPEC AEROBE CULT: NO GROWTH

## 2021-11-22 ENCOUNTER — TELEPHONE (OUTPATIENT)
Dept: OBSTETRICS AND GYNECOLOGY | Facility: CLINIC | Age: 36
End: 2021-11-22

## 2021-11-22 RX ORDER — BENZONATATE 100 MG/1
100 CAPSULE ORAL 3 TIMES DAILY PRN
Qty: 30 CAPSULE | Refills: 1 | Status: ON HOLD | OUTPATIENT
Start: 2021-11-22 | End: 2021-12-12

## 2021-11-22 NOTE — TELEPHONE ENCOUNTER
Patient called with complaints of a cough for over a month. Has seen urgent care and been prescribed medication. She has finished that rx and wanted to know what else she could take.   Let patient know I spoke with dr springer and we can send in tessalon pearls and recommend she take otc prilosec as reflux can be related to cough.    Also let patient know dr springer has reviewed her US report and she has placenta previa. Advised her of pelvic rest and that if she has any bleeding to contact the office.    Patient verbalized understanding.

## 2021-11-29 ENCOUNTER — ROUTINE PRENATAL (OUTPATIENT)
Dept: OBSTETRICS AND GYNECOLOGY | Facility: CLINIC | Age: 36
End: 2021-11-29

## 2021-11-29 ENCOUNTER — LAB (OUTPATIENT)
Dept: LAB | Facility: HOSPITAL | Age: 36
End: 2021-11-29

## 2021-11-29 VITALS — SYSTOLIC BLOOD PRESSURE: 118 MMHG | BODY MASS INDEX: 26.57 KG/M2 | WEIGHT: 150 LBS | DIASTOLIC BLOOD PRESSURE: 60 MMHG

## 2021-11-29 DIAGNOSIS — N89.8 VAGINAL IRRITATION: ICD-10-CM

## 2021-11-29 DIAGNOSIS — O09.92 SUPERVISION OF HIGH RISK PREGNANCY IN SECOND TRIMESTER: Primary | ICD-10-CM

## 2021-11-29 DIAGNOSIS — M54.9 BACK PAIN IN PREGNANCY: ICD-10-CM

## 2021-11-29 DIAGNOSIS — O34.219 PREVIOUS CESAREAN DELIVERY AFFECTING PREGNANCY: ICD-10-CM

## 2021-11-29 DIAGNOSIS — O21.9 NAUSEA AND VOMITING DURING PREGNANCY PRIOR TO 22 WEEKS GESTATION: ICD-10-CM

## 2021-11-29 DIAGNOSIS — Z87.59 HISTORY OF SEVERE PRE-ECLAMPSIA: ICD-10-CM

## 2021-11-29 DIAGNOSIS — F41.9 ANXIETY DURING PREGNANCY: ICD-10-CM

## 2021-11-29 DIAGNOSIS — O44.02 PLACENTA PREVIA IN SECOND TRIMESTER: ICD-10-CM

## 2021-11-29 DIAGNOSIS — O99.891 BACK PAIN IN PREGNANCY: ICD-10-CM

## 2021-11-29 DIAGNOSIS — O09.92 SUPERVISION OF HIGH RISK PREGNANCY IN SECOND TRIMESTER: ICD-10-CM

## 2021-11-29 DIAGNOSIS — O99.340 ANXIETY DURING PREGNANCY: ICD-10-CM

## 2021-11-29 DIAGNOSIS — O09.522 MULTIGRAVIDA OF ADVANCED MATERNAL AGE IN SECOND TRIMESTER: ICD-10-CM

## 2021-11-29 DIAGNOSIS — Z3A.22 22 WEEKS GESTATION OF PREGNANCY: ICD-10-CM

## 2021-11-29 LAB
CANDIDA ALBICANS: NEGATIVE
GARDNERELLA VAGINALIS: POSITIVE
T VAGINALIS DNA VAG QL PROBE+SIG AMP: NEGATIVE

## 2021-11-29 PROCEDURE — 87510 GARDNER VAG DNA DIR PROBE: CPT | Performed by: OBSTETRICS & GYNECOLOGY

## 2021-11-29 PROCEDURE — 86336 INHIBIN A: CPT

## 2021-11-29 PROCEDURE — 84702 CHORIONIC GONADOTROPIN TEST: CPT

## 2021-11-29 PROCEDURE — 82105 ALPHA-FETOPROTEIN SERUM: CPT

## 2021-11-29 PROCEDURE — 87480 CANDIDA DNA DIR PROBE: CPT | Performed by: OBSTETRICS & GYNECOLOGY

## 2021-11-29 PROCEDURE — 87660 TRICHOMONAS VAGIN DIR PROBE: CPT | Performed by: OBSTETRICS & GYNECOLOGY

## 2021-11-29 PROCEDURE — 0502F SUBSEQUENT PRENATAL CARE: CPT | Performed by: OBSTETRICS & GYNECOLOGY

## 2021-11-29 PROCEDURE — 82677 ASSAY OF ESTRIOL: CPT

## 2021-11-29 RX ORDER — METRONIDAZOLE 500 MG/1
500 TABLET ORAL 2 TIMES DAILY
Qty: 14 TABLET | Refills: 0 | Status: SHIPPED | OUTPATIENT
Start: 2021-11-29 | End: 2021-12-06

## 2021-11-29 NOTE — PROGRESS NOTES
CC: Prenatal visit    Zahra Gasca is a 36 y.o.  at 22w2d.  Doing well.  Denies contractions, LOF, or VB.  Reports good FM.  She has had intermittent L back pain that wraps around to her front.  She also reports vaginal itching and burning.    /60   Wt 68 kg (150 lb)   LMP 2021 (Approximate)   BMI 26.57 kg/m²   Fundal Height (cm): 22 cm  Fetal Heart Rate: 134     Problems (from 21 to present)     Problem Noted Resolved    Placenta previa in second trimester 2021 by Alysha Curran MD No    Overview Signed 2021  4:40 PM by Alysha Curran MD     Diagnosed on  at 20w3d  Pelvic rest restrictions given; discussed risks of placenta accreta if persistent  Repeat US 28-32 wks to see if resolved         Previous  delivery affecting pregnancy 2021 by Alysha Curran MD No    Overview Signed 2021 10:05 AM by Alysha Curran MD     LTCS x1 (secondary to preE w/ pulmonary edema)         Supervision of high risk pregnancy in second trimester 2021 by Alysha Curran MD No    Multigravida of advanced maternal age in second trimester 2021 by Jennifer Shi APRN No    Anxiety during pregnancy 2021 by Jennifer Shi APRN No    Nausea and vomiting during pregnancy prior to 22 weeks gestation 2021 by Jennifer Shi APRN No    History of severe pre-eclampsia 2021 by Jennifer Shi APRN No    Overview Signed 2021 10:06 AM by Alysha Curran MD     Severe preE w/ pulmonary edema  Baseline labs WNL             A/P: Zahra Gasca is a 36 y.o.  at 22w2d.  - RTC in 4 weeks w/ 3T labs, Tdap, breastpump script  - Vag panel today  - Reviewed US finding of placenta previa.  Discussed that what is concerning is that she has had a prior  and this US finding puts her at risk for placenta  accreta.  Discussed morbidly adherent placentas.  She has a 7% risk of placenta accreta if persistent previa.  Will obtain msAFP as screening.  Reviewed recommendation for MFM US at 28-30 weeks to evaluate placenta thoroughly.  - Reviewed COVID-19 visitation policy  - Reviewed COVID-19 precautions     Diagnosis Plan   1. Supervision of high risk pregnancy in second trimester  CBC (No Diff)    Glucose, Post 50 Gm Glucola    Maternal Screen 4   2. Placenta previa in second trimester  Maternal Screen 4   3. Previous  delivery affecting pregnancy     4. Multigravida of advanced maternal age in second trimester     5. Nausea and vomiting during pregnancy prior to 22 weeks gestation     6. History of severe pre-eclampsia     7. Anxiety during pregnancy     8. 22 weeks gestation of pregnancy     9. Back pain in pregnancy       Alysha Curran MD  2021  14:06 CST

## 2021-12-03 LAB
2ND TRIMESTER 4 SCREEN SERPL-IMP: NORMAL
AFP ADJ MOM SERPL: 1.35
AFP SERPL-MCNC: 150.1 NG/ML
AGE AT DELIVERY: 36.7 YR
FET TS 18 RISK FROM MAT AGE: NORMAL
FET TS 21 RISK FROM MAT AGE: 203
GA METHOD: NORMAL
GA: 22.3 WEEKS
HCG ADJ MOM SERPL: NORMAL
HCG SERPL-ACNC: NORMAL MIU/ML
IDDM PATIENT QL: NO
INHIBIN A ADJ MOM SERPL: NORMAL
INHIBIN A SERPL-MCNC: 123.82 PG/ML
MULTIPLE PREGNANCY: NO
NEURAL TUBE DEFECT RISK FETUS: 4151 %
SERVICE CMNT-IMP: NORMAL
TS 18 RISK FETUS: NORMAL
TS 21 RISK FETUS: NORMAL
U ESTRIOL ADJ MOM SERPL: NORMAL
U ESTRIOL SERPL-MCNC: 3.48 NG/ML

## 2021-12-06 ENCOUNTER — TELEPHONE (OUTPATIENT)
Dept: OBSTETRICS AND GYNECOLOGY | Facility: CLINIC | Age: 36
End: 2021-12-06

## 2021-12-06 NOTE — TELEPHONE ENCOUNTER
Patient returned call.  Let her know labs are normal and let her know that she is not doing a 3 hour and that we start with 1 hour glucose which is done here at the lab in the [facility] and if she were to fail that then we would proceed with a 3 hour.  Patient verbalized understanding

## 2021-12-12 ENCOUNTER — HOSPITAL ENCOUNTER (OUTPATIENT)
Facility: HOSPITAL | Age: 36
Discharge: LEFT BEFORE DISCHARGE | DRG: 833 | End: 2021-12-12
Attending: STUDENT IN AN ORGANIZED HEALTH CARE EDUCATION/TRAINING PROGRAM | Admitting: STUDENT IN AN ORGANIZED HEALTH CARE EDUCATION/TRAINING PROGRAM
Payer: COMMERCIAL

## 2021-12-12 VITALS
BODY MASS INDEX: 26.58 KG/M2 | WEIGHT: 150 LBS | HEART RATE: 89 BPM | RESPIRATION RATE: 18 BRPM | TEMPERATURE: 99.2 F | HEIGHT: 63 IN | OXYGEN SATURATION: 98 %

## 2021-12-12 PROBLEM — O44.02 PLACENTA PREVIA, SECOND TRIMESTER: Status: ACTIVE | Noted: 2021-12-12

## 2021-12-12 PROCEDURE — G0463 HOSPITAL OUTPT CLINIC VISIT: HCPCS

## 2021-12-12 PROCEDURE — 59025 FETAL NON-STRESS TEST: CPT

## 2021-12-12 PROCEDURE — 25010000002 BETAMETHASONE ACET & SOD PHOS PER 4 MG: Performed by: STUDENT IN AN ORGANIZED HEALTH CARE EDUCATION/TRAINING PROGRAM

## 2021-12-12 PROCEDURE — 96372 THER/PROPH/DIAG INJ SC/IM: CPT

## 2021-12-12 PROCEDURE — 59025 FETAL NON-STRESS TEST: CPT | Performed by: STUDENT IN AN ORGANIZED HEALTH CARE EDUCATION/TRAINING PROGRAM

## 2021-12-12 PROCEDURE — 99235 HOSP IP/OBS SAME DATE MOD 70: CPT | Performed by: STUDENT IN AN ORGANIZED HEALTH CARE EDUCATION/TRAINING PROGRAM

## 2021-12-12 RX ORDER — SODIUM CHLORIDE 0.9 % (FLUSH) 0.9 %
3 SYRINGE (ML) INJECTION EVERY 12 HOURS SCHEDULED
Status: DISCONTINUED | OUTPATIENT
Start: 2021-12-12 | End: 2021-12-12 | Stop reason: HOSPADM

## 2021-12-12 RX ORDER — SODIUM CHLORIDE 0.9 % (FLUSH) 0.9 %
3-10 SYRINGE (ML) INJECTION AS NEEDED
Status: DISCONTINUED | OUTPATIENT
Start: 2021-12-12 | End: 2021-12-12 | Stop reason: HOSPADM

## 2021-12-12 RX ORDER — BETAMETHASONE SODIUM PHOSPHATE AND BETAMETHASONE ACETATE 3; 3 MG/ML; MG/ML
12 INJECTION, SUSPENSION INTRA-ARTICULAR; INTRALESIONAL; INTRAMUSCULAR; SOFT TISSUE EVERY 24 HOURS
Status: DISCONTINUED | OUTPATIENT
Start: 2021-12-12 | End: 2021-12-12 | Stop reason: HOSPADM

## 2021-12-12 RX ORDER — LIDOCAINE HYDROCHLORIDE 10 MG/ML
5 INJECTION, SOLUTION EPIDURAL; INFILTRATION; INTRACAUDAL; PERINEURAL AS NEEDED
Status: DISCONTINUED | OUTPATIENT
Start: 2021-12-12 | End: 2021-12-12 | Stop reason: HOSPADM

## 2021-12-12 RX ADMIN — BETAMETHASONE SODIUM PHOSPHATE AND BETAMETHASONE ACETATE 12 MG: 3; 3 INJECTION, SUSPENSION INTRA-ARTICULAR; INTRALESIONAL; INTRAMUSCULAR at 07:39

## 2021-12-13 ENCOUNTER — HOSPITAL ENCOUNTER (OUTPATIENT)
Facility: HOSPITAL | Age: 36
End: 2021-12-13
Attending: OBSTETRICS & GYNECOLOGY | Admitting: OBSTETRICS & GYNECOLOGY

## 2021-12-13 ENCOUNTER — HOSPITAL ENCOUNTER (OUTPATIENT)
Facility: HOSPITAL | Age: 36
Discharge: HOME | End: 2021-12-13
Attending: OBSTETRICS & GYNECOLOGY | Admitting: OBSTETRICS & GYNECOLOGY

## 2021-12-13 VITALS
OXYGEN SATURATION: 96 % | TEMPERATURE: 98.4 F | HEIGHT: 63 IN | RESPIRATION RATE: 18 BRPM | WEIGHT: 150 LBS | HEART RATE: 78 BPM | BODY MASS INDEX: 26.58 KG/M2

## 2021-12-13 PROCEDURE — 59025 FETAL NON-STRESS TEST: CPT | Performed by: OBSTETRICS & GYNECOLOGY

## 2021-12-13 PROCEDURE — 25010000002 RHO D IMMUNE GLOBULIN 1500 UNITS SOLUTION PREFILLED SYRINGE: Performed by: OBSTETRICS & GYNECOLOGY

## 2021-12-13 PROCEDURE — G0463 HOSPITAL OUTPT CLINIC VISIT: HCPCS

## 2021-12-13 PROCEDURE — 25010000002 BETAMETHASONE ACET & SOD PHOS PER 4 MG: Performed by: OBSTETRICS & GYNECOLOGY

## 2021-12-13 PROCEDURE — 59025 FETAL NON-STRESS TEST: CPT

## 2021-12-13 PROCEDURE — 96372 THER/PROPH/DIAG INJ SC/IM: CPT

## 2021-12-13 RX ORDER — BETAMETHASONE SODIUM PHOSPHATE AND BETAMETHASONE ACETATE 3; 3 MG/ML; MG/ML
12 INJECTION, SUSPENSION INTRA-ARTICULAR; INTRALESIONAL; INTRAMUSCULAR; SOFT TISSUE EVERY 24 HOURS
Status: DISCONTINUED | OUTPATIENT
Start: 2021-12-13 | End: 2021-12-13 | Stop reason: HOSPADM

## 2021-12-13 RX ADMIN — HUMAN RHO(D) IMMUNE GLOBULIN 1500 UNITS: 300 INJECTION, SOLUTION INTRAMUSCULAR at 08:41

## 2021-12-13 RX ADMIN — BETAMETHASONE SODIUM PHOSPHATE AND BETAMETHASONE ACETATE 12 MG: 3; 3 INJECTION, SUSPENSION INTRA-ARTICULAR; INTRALESIONAL; INTRAMUSCULAR at 08:25

## 2021-12-13 NOTE — NON STRESS TEST
Zahra Gasca, a  at 24w2d with an ALEXEY of 2022, by Last Menstrual Period, was seen at [facility] LABOR DELIVERY for a nonstress test.    Chief Complaint   Patient presents with   • Medication Administration       Patient Active Problem List   Diagnosis   • Anxiety state   • Multigravida of advanced maternal age in second trimester   • Anxiety during pregnancy   • Nausea and vomiting during pregnancy prior to 22 weeks gestation   • History of severe pre-eclampsia   • Previous  delivery affecting pregnancy   • Supervision of high risk pregnancy in second trimester   • Placenta previa in second trimester   • Nephrolithiasis   • Placenta previa, second trimester       Start Time: 0750  Stop Time:0815    Interpretation A  Nonstress Test Interpretation A: Reactive (21 : Alice Montgomery, RN)  Comments A: reviewed strip with Lidya Reid RNC (21 : Alice Montgomery, RN)

## 2022-01-04 ENCOUNTER — LAB (OUTPATIENT)
Dept: LAB | Facility: HOSPITAL | Age: 37
End: 2022-01-04

## 2022-01-04 ENCOUNTER — ROUTINE PRENATAL (OUTPATIENT)
Dept: OBSTETRICS AND GYNECOLOGY | Facility: CLINIC | Age: 37
End: 2022-01-04

## 2022-01-04 VITALS — DIASTOLIC BLOOD PRESSURE: 78 MMHG | BODY MASS INDEX: 27.28 KG/M2 | WEIGHT: 154 LBS | SYSTOLIC BLOOD PRESSURE: 122 MMHG

## 2022-01-04 DIAGNOSIS — O09.522 MULTIGRAVIDA OF ADVANCED MATERNAL AGE IN SECOND TRIMESTER: ICD-10-CM

## 2022-01-04 DIAGNOSIS — O99.340 ANXIETY DURING PREGNANCY: ICD-10-CM

## 2022-01-04 DIAGNOSIS — O09.92 SUPERVISION OF HIGH RISK PREGNANCY IN SECOND TRIMESTER: Primary | ICD-10-CM

## 2022-01-04 DIAGNOSIS — O09.92 SUPERVISION OF HIGH RISK PREGNANCY IN SECOND TRIMESTER: ICD-10-CM

## 2022-01-04 DIAGNOSIS — O34.219 PREVIOUS CESAREAN DELIVERY AFFECTING PREGNANCY: ICD-10-CM

## 2022-01-04 DIAGNOSIS — Z87.59 HISTORY OF SEVERE PRE-ECLAMPSIA: ICD-10-CM

## 2022-01-04 DIAGNOSIS — F41.9 ANXIETY DURING PREGNANCY: ICD-10-CM

## 2022-01-04 DIAGNOSIS — O44.02 PLACENTA PREVIA IN SECOND TRIMESTER: ICD-10-CM

## 2022-01-04 DIAGNOSIS — O21.9 NAUSEA AND VOMITING DURING PREGNANCY PRIOR TO 22 WEEKS GESTATION: ICD-10-CM

## 2022-01-04 LAB — GLUCOSE 1H P 100 G GLC PO SERPL-MCNC: 149 MG/DL (ref 65–139)

## 2022-01-04 PROCEDURE — 85027 COMPLETE CBC AUTOMATED: CPT

## 2022-01-04 PROCEDURE — 82950 GLUCOSE TEST: CPT

## 2022-01-04 PROCEDURE — 36415 COLL VENOUS BLD VENIPUNCTURE: CPT

## 2022-01-04 PROCEDURE — 0502F SUBSEQUENT PRENATAL CARE: CPT | Performed by: OBSTETRICS & GYNECOLOGY

## 2022-01-04 NOTE — PROGRESS NOTES
CC: Prenatal visit    Zahra Gasca is a 36 y.o.  at 27w3d.  Doing well.  Denies contractions, LOF, or VB.  Reports good FM.  She wanted to know about using an [employer] energy supplement.  She wants a BTL.    /78   Wt 69.9 kg (154 lb)   LMP 2021 (Approximate)   BMI 27.28 kg/m²   Fundal Height (cm): 27 cm  Fetal Heart Rate: 142     Problems (from 21 to present)     Problem Noted Resolved    Placenta previa in second trimester 2021 by Alysha Curran MD No    Overview Signed 2021  4:40 PM by Alysha Curran MD     Diagnosed on  at 20w3d  Pelvic rest restrictions given; discussed risks of placenta accreta if persistent  Repeat US 28-32 wks to see if resolved         Previous  delivery affecting pregnancy 2021 by Alysha Curran MD No    Overview Signed 2021 10:05 AM by Alysha Curran MD     LTCS x1 (secondary to preE w/ pulmonary edema)         Supervision of high risk pregnancy in second trimester 2021 by Alysha Curran MD No    Multigravida of advanced maternal age in second trimester 2021 by Jennifer Shi APRN No    Anxiety during pregnancy 2021 by Jennifer Shi APRN No    Nausea and vomiting during pregnancy prior to 22 weeks gestation 2021 by Jennifer Shi APRN No    History of severe pre-eclampsia 2021 by Jennifer Shi APRN No    Overview Signed 2021 10:06 AM by Alysha Curran MD     Severe preE w/ pulmonary edema  Baseline labs WNL             A/P: Zahra Gasca is a 36 y.o.  at 27w3d.  - RTC in 3 weeks w/ US (placenta previa)  - 3T labs today  - Tdap today  - Patient was going to sign BTL consents.  When we went to sign them, she declined.  Will readdress at next visit.  - Discussed that we can't say that the supplements are safe in pregnancy because not  well studied.  Reviewed no ingredients are teratogenic or harmful.  - Reviewed COVID-19 visitation policy  - Reviewed COVID-19 precautions     Diagnosis Plan   1. Supervision of high risk pregnancy in second trimester     2. Placenta previa in second trimester  US ob follow up transabdominal approach   3. Previous  delivery affecting pregnancy     4. Multigravida of advanced maternal age in second trimester     5. Nausea and vomiting during pregnancy prior to 22 weeks gestation     6. History of severe pre-eclampsia     7. Anxiety during pregnancy     8. 27 weeks gestation of pregnancy       Alysha Curran MD  2022  13:46 CST

## 2022-01-05 LAB
DEPRECATED RDW RBC AUTO: 40.1 FL (ref 37–54)
ERYTHROCYTE [DISTWIDTH] IN BLOOD BY AUTOMATED COUNT: 13.4 % (ref 12.3–15.4)
HCT VFR BLD AUTO: 32.9 % (ref 34–46.6)
HGB BLD-MCNC: 11 G/DL (ref 12–15.9)
MCH RBC QN AUTO: 27.6 PG (ref 26.6–33)
MCHC RBC AUTO-ENTMCNC: 33.4 G/DL (ref 31.5–35.7)
MCV RBC AUTO: 82.7 FL (ref 79–97)
PLATELET # BLD AUTO: 208 10*3/MM3 (ref 140–450)
PMV BLD AUTO: 10.9 FL (ref 6–12)
RBC # BLD AUTO: 3.98 10*6/MM3 (ref 3.77–5.28)
WBC NRBC COR # BLD: 8.45 10*3/MM3 (ref 3.4–10.8)

## 2022-01-06 ENCOUNTER — TELEPHONE (OUTPATIENT)
Dept: OBSTETRICS AND GYNECOLOGY | Facility: CLINIC | Age: 37
End: 2022-01-06

## 2022-01-06 NOTE — TELEPHONE ENCOUNTER
----- Message from Alysha Curran MD sent at 1/5/2022 11:28 AM CST -----  Please call and let her know that she failed her glucola and needs to either do a 3h GTT or check her BS for 1 week.  She is not anemic.

## 2022-01-06 NOTE — TELEPHONE ENCOUNTER
Patient sent [employer] message requesting results. Let her know she did fail 1 hour but is not anemic. She can schedule 3 hour or check blood sugars for 1 week.  Patient verbalized understanding and states she will consult with her  and let us know what she decides.

## 2022-01-10 ENCOUNTER — TELEPHONE (OUTPATIENT)
Dept: OBSTETRICS AND GYNECOLOGY | Facility: CLINIC | Age: 37
End: 2022-01-10

## 2022-01-10 ENCOUNTER — OFFICE VISIT (OUTPATIENT)
Dept: FAMILY MEDICINE CLINIC | Facility: CLINIC | Age: 37
End: 2022-01-10

## 2022-01-10 ENCOUNTER — LAB (OUTPATIENT)
Dept: LAB | Facility: HOSPITAL | Age: 37
End: 2022-01-10

## 2022-01-10 VITALS
WEIGHT: 153 LBS | SYSTOLIC BLOOD PRESSURE: 106 MMHG | OXYGEN SATURATION: 100 % | DIASTOLIC BLOOD PRESSURE: 74 MMHG | HEIGHT: 63 IN | BODY MASS INDEX: 27.11 KG/M2 | HEART RATE: 94 BPM

## 2022-01-10 DIAGNOSIS — R21 FACIAL RASH: Primary | ICD-10-CM

## 2022-01-10 DIAGNOSIS — R21 FACIAL RASH: ICD-10-CM

## 2022-01-10 DIAGNOSIS — O44.00 PLACENTA PREVIA ANTEPARTUM: ICD-10-CM

## 2022-01-10 PROCEDURE — 36415 COLL VENOUS BLD VENIPUNCTURE: CPT

## 2022-01-10 PROCEDURE — 86038 ANTINUCLEAR ANTIBODIES: CPT

## 2022-01-10 PROCEDURE — 99213 OFFICE O/P EST LOW 20 MIN: CPT | Performed by: FAMILY MEDICINE

## 2022-01-10 NOTE — PROGRESS NOTES
"Chief Complaint  Anxiety    Subjective          Zahra Gasca presents to [facility] [place]  History of Present Illness    Patient seen today for follow up.  Patient is managing anxiety without treatment at this time.  Patient has placenta previa, vaginal bleeding episode after carrying boxes to help with disaster relief.  Was admitted, but had to leave AMA to take care of her child.  She was sent home with precautions and on pelvic rest.   Has been doing ok, however had another brief episode of vaginal bleeding a couple of days ago.  She has not notified OB yet, has upcoming appointment.     Patient does have some flushing/red rash on cheeks intermittently.  Concerned for rosacea.  Also has flesh colored lesion behind right ear, unchanged.     Objective   Vital Signs:   /74   Pulse 94   Ht 160 cm (63\")   Wt 69.4 kg (153 lb)   SpO2 100%   BMI 27.10 kg/m²     Physical Exam  Vitals reviewed.   Constitutional:       General: She is not in acute distress.     Appearance: She is well-developed.   Cardiovascular:      Rate and Rhythm: Normal rate and regular rhythm.      Heart sounds: Normal heart sounds. No murmur heard.      Pulmonary:      Effort: Pulmonary effort is normal. No respiratory distress.      Breath sounds: Normal breath sounds. No wheezing or rales.   Abdominal:      Palpations: Abdomen is soft.      Tenderness: There is no abdominal tenderness.   Skin:     General: Skin is warm and dry.      Comments: Slightly erythematous, patchy discoloration of cheeks, no papules or vesicles   Neurological:      Mental Status: She is alert and oriented to person, place, and time.        Result Review :   The following data was reviewed by: Niecy Butler MD on 01/10/2022:  Common labs    Common Labsle 1/23/22 1/24/22 1/27/22 1/27/22      0749 0749   Glucose    101 (A)   BUN    8   Creatinine    0.64   eGFR Non African Am    105   Sodium    137 "   Potassium    3.9   Chloride    104   Calcium    9.1   Albumin    3.80   Total Bilirubin    <0.2   Alkaline Phosphatase    94   AST (SGOT)    15   ALT (SGPT)    13   WBC 12.94 (A) 11.95 (A) 9.76    Hemoglobin 10.6 (A) 9.5 (A) 10.8 (A)    Hematocrit 31.9 (A) 28.6 (A) 33.0 (A)    Platelets 262 221 246    (A) Abnormal value                      Assessment and Plan    Diagnoses and all orders for this visit:    1. Facial rash (Primary)  -     MARTHA; Future    2. Placenta previa antepartum      Anxiety manageable now, will re-evaluate in the postpartum period  She was advised to consider starting counseling sessions prior to delivery to have established connection in case symptoms acutely worsened after delivery     Will check MARTHA to rule out autoimmune causes of rash, specifically lupus  Much more commonly, could be rosacea  Can discuss treatment options with negative lab    Patient advised that she should notify OB with bleeding, no active bleeding at this time  Continue to follow specialist recommendations for rest           Follow Up   Return in about 4 months (around 5/10/2022).  Patient was given instructions and counseling regarding her condition or for health maintenance advice. Please see specific information pulled into the AVS if appropriate.         This document has been electronically signed by Niecy Butler MD

## 2022-01-10 NOTE — TELEPHONE ENCOUNTER
Patient called with concerns stating she had some bleeding this morning and wanted to speak with someone.    States after using the restroom she has not had any additional bleeding. has not had any leakage of fluid and is monitoring baby movements and states baby is moving good.  Advised patient with placenta previa she can have some bleeding, advised her to wear a pad and monitor over the next few hours and if she grows more concerned or has any heavier bleeding or baby is not moving to contact the office.    Patient verbalized understanding

## 2022-01-11 LAB — ANA SER QL: NEGATIVE

## 2022-01-12 ENCOUNTER — TELEPHONE (OUTPATIENT)
Dept: FAMILY MEDICINE CLINIC | Facility: CLINIC | Age: 37
End: 2022-01-12

## 2022-01-12 ENCOUNTER — TELEPHONE (OUTPATIENT)
Dept: OBSTETRICS AND GYNECOLOGY | Facility: CLINIC | Age: 37
End: 2022-01-12

## 2022-01-12 ENCOUNTER — LAB (OUTPATIENT)
Dept: LAB | Facility: HOSPITAL | Age: 37
End: 2022-01-12

## 2022-01-12 DIAGNOSIS — R21 FACIAL RASH: Primary | ICD-10-CM

## 2022-01-12 DIAGNOSIS — O99.810 GLUCOSE INTOLERANCE OF PREGNANCY: Primary | ICD-10-CM

## 2022-01-12 DIAGNOSIS — O99.810 GLUCOSE INTOLERANCE OF PREGNANCY: ICD-10-CM

## 2022-01-12 LAB
GLUCOSE P FAST SERPL-MCNC: 103 MG/DL (ref 65–94)
GTT GEST 2H PNL UR+SERPL: 198 MG/DL (ref 65–179)
GTT GEST 3H PNL SERPL: 119 MG/DL (ref 65–139)
GTT GEST 3H PNL SERPL: 123 MG/DL (ref 65–154)

## 2022-01-12 PROCEDURE — 36415 COLL VENOUS BLD VENIPUNCTURE: CPT

## 2022-01-12 PROCEDURE — 82952 GTT-ADDED SAMPLES: CPT

## 2022-01-12 PROCEDURE — 82951 GLUCOSE TOLERANCE TEST (GTT): CPT

## 2022-01-12 RX ORDER — LANCETS 30 GAUGE
1 EACH MISCELLANEOUS 4 TIMES DAILY
Qty: 200 EACH | Refills: 5 | Status: ON HOLD | OUTPATIENT
Start: 2022-01-12 | End: 2022-03-07

## 2022-01-12 RX ORDER — BLOOD-GLUCOSE METER
1 KIT MISCELLANEOUS 4 TIMES DAILY
Qty: 1 EACH | Refills: 0 | Status: ON HOLD | OUTPATIENT
Start: 2022-01-12 | End: 2022-03-07

## 2022-01-12 RX ORDER — BLOOD PRESSURE TEST KIT
1 KIT MISCELLANEOUS 4 TIMES DAILY
Qty: 120 EACH | Refills: 11 | Status: ON HOLD | OUTPATIENT
Start: 2022-01-12 | End: 2022-03-07

## 2022-01-12 NOTE — TELEPHONE ENCOUNTER
----- Message from Alysha Curran MD sent at 1/12/2022 12:33 PM CST -----  Please let her know that she has GDM (2 or more levels that are abnormal and she has 2). Recommend testing BS fasting and 1h PP with each meal. Fasting levels should be <95, 1h PP <140. Starting at 32 weeks, she will need to have weekly appointments w/ ultrasounds to check on the baby (called biophysical profiles). We can upload a sheet for her to track her BS onto her [employer] that she can print off at home.

## 2022-01-12 NOTE — TELEPHONE ENCOUNTER
----- Message from Alysha Curran MD sent at 1/12/2022 12:33 PM CST -----  Please let her know that she has GDM (2 or more levels that are abnormal and she has 2). Recommend testing BS fasting and 1h PP with each meal. Fasting levels should be <95, 1h PP <140. Starting at 32 weeks, she will need to have weekly appointments w/ ultrasounds to check on the baby (called biophysical profiles). We can upload a sheet for her to track her BS onto her [employer] that she can print off at home.

## 2022-01-12 NOTE — TELEPHONE ENCOUNTER
Called and spoke with patient, notified of results.  Discussed testing blood sugars and need for ultrasounds beginning at 32 weeks.   Patient verbalized understanding and would like testing supplies to be sent to [facility] pharmacy.    Will send patient a [employer] message advising how to test sugars and keep a log of results.

## 2022-01-13 NOTE — TELEPHONE ENCOUNTER
Per ,  has been called with recent lab results and recommendations  Continue current medications and follow-up as planned.     Ms. Gasca ask about the referral to Dermatology, are you still going to refer her to one?

## 2022-01-14 ENCOUNTER — TELEPHONE (OUTPATIENT)
Dept: OBSTETRICS AND GYNECOLOGY | Facility: CLINIC | Age: 37
End: 2022-01-14

## 2022-01-14 NOTE — TELEPHONE ENCOUNTER
MISS DOREEN ZULETA, PATIENT CALLED IN REGARDS TO A DIETICIAN DUE TO HER GESTATIONAL DIABETES AS WELL AS A BREAST PUMP. PATIENT STATED THAT SOMEONE WAS TO REACH OUT TO HER AND NO ONE HAS YET.  CALL BACK # [phone]  THANK YOU AND GOD ROSCOE MALIK

## 2022-01-17 DIAGNOSIS — O99.810 GLUCOSE INTOLERANCE OF PREGNANCY: Primary | ICD-10-CM

## 2022-01-17 NOTE — TELEPHONE ENCOUNTER
Patient returned call.   States she has heard from dietitian. Let her know I would resend breast pump script to alternate fax number.  Pt verbalized understanding

## 2022-01-19 ENCOUNTER — DOCUMENTATION (OUTPATIENT)
Dept: NUTRITION | Facility: HOSPITAL | Age: 37
End: 2022-01-19

## 2022-01-19 NOTE — PROGRESS NOTES
Nutrition Services    Patient Name:  Zahra Gasca  YOB: 1985  MRN: [id number]  Admit Date:  (Not on file)    Pt was referred for education re Gestational Diabetes by Dr. Alysha Curran. Spoke with pt by phone to make an appt. Pt had questions RDN answered and pt wanted RDN to mail nutrition info to her first. Mailed sample menu, food list and tips for low blood sugar. RDN will follow as needed. Provider is aware.    Electronically signed by:  Khloe Ramsay RD  01/19/22 09:29 CST

## 2022-01-22 ENCOUNTER — HOSPITAL ENCOUNTER (INPATIENT)
Facility: HOSPITAL | Age: 37
LOS: 45 days | Discharge: HOME | End: 2022-03-08
Attending: STUDENT IN AN ORGANIZED HEALTH CARE EDUCATION/TRAINING PROGRAM | Admitting: OBSTETRICS & GYNECOLOGY

## 2022-01-22 ENCOUNTER — APPOINTMENT (OUTPATIENT)
Dept: ULTRASOUND IMAGING | Facility: HOSPITAL | Age: 37
End: 2022-01-22

## 2022-01-22 DIAGNOSIS — O24.410 DIET CONTROLLED GESTATIONAL DIABETES MELLITUS (GDM) IN THIRD TRIMESTER: ICD-10-CM

## 2022-01-22 DIAGNOSIS — O36.5930 POOR FETAL GROWTH AFFECTING MANAGEMENT OF MOTHER IN THIRD TRIMESTER, SINGLE OR UNSPECIFIED FETUS: ICD-10-CM

## 2022-01-22 DIAGNOSIS — O44.00 PLACENTA PREVIA ANTEPARTUM: Primary | ICD-10-CM

## 2022-01-22 LAB
ABO GROUP BLD: NORMAL
ALBUMIN SERPL-MCNC: 3.7 G/DL (ref 3.5–5.2)
ALBUMIN/GLOB SERPL: 1.6 G/DL
ALP SERPL-CCNC: 100 U/L (ref 39–117)
ALT SERPL W P-5'-P-CCNC: 10 U/L (ref 1–33)
AMPHET+METHAMPHET UR QL: NEGATIVE
AMPHETAMINES UR QL: NEGATIVE
ANION GAP SERPL CALCULATED.3IONS-SCNC: 10 MMOL/L (ref 5–15)
ANTI-D, PASSIVE: NORMAL
APTT PPP: 24.2 SECONDS (ref 20–40.3)
AST SERPL-CCNC: 19 U/L (ref 1–32)
BARBITURATES UR QL SCN: NEGATIVE
BENZODIAZ UR QL SCN: NEGATIVE
BILIRUB SERPL-MCNC: 0.2 MG/DL (ref 0–1.2)
BLD GP AB SCN SERPL QL: POSITIVE
BUN SERPL-MCNC: 7 MG/DL (ref 6–20)
BUN/CREAT SERPL: 10.1 (ref 7–25)
BUPRENORPHINE SERPL-MCNC: NEGATIVE NG/ML
CALCIUM SPEC-SCNC: 9.3 MG/DL (ref 8.6–10.5)
CANNABINOIDS SERPL QL: NEGATIVE
CHLORIDE SERPL-SCNC: 105 MMOL/L (ref 98–107)
CO2 SERPL-SCNC: 23 MMOL/L (ref 22–29)
COCAINE UR QL: NEGATIVE
CREAT SERPL-MCNC: 0.69 MG/DL (ref 0.57–1)
DEPRECATED RDW RBC AUTO: 39.7 FL (ref 37–54)
ERYTHROCYTE [DISTWIDTH] IN BLOOD BY AUTOMATED COUNT: 13.8 % (ref 12.3–15.4)
FIBRINOGEN PPP-MCNC: 434 MG/DL (ref 228–514)
GFR SERPL CREATININE-BSD FRML MDRD: 96 ML/MIN/1.73
GLOBULIN UR ELPH-MCNC: 2.3 GM/DL
GLUCOSE BLDC GLUCOMTR-MCNC: 143 MG/DL (ref 70–130)
GLUCOSE BLDC GLUCOMTR-MCNC: 190 MG/DL (ref 70–130)
GLUCOSE BLDC GLUCOMTR-MCNC: 198 MG/DL (ref 70–130)
GLUCOSE SERPL-MCNC: 136 MG/DL (ref 65–99)
HCT VFR BLD AUTO: 33.8 % (ref 34–46.6)
HGB BLD-MCNC: 11.5 G/DL (ref 12–15.9)
HOLD SPECIMEN: NORMAL
INR PPP: 0.94 (ref 0.8–1.2)
Lab: NORMAL
MCH RBC QN AUTO: 27.4 PG (ref 26.6–33)
MCHC RBC AUTO-ENTMCNC: 34 G/DL (ref 31.5–35.7)
MCV RBC AUTO: 80.7 FL (ref 79–97)
METHADONE UR QL SCN: NEGATIVE
OPIATES UR QL: NEGATIVE
OXYCODONE UR QL SCN: NEGATIVE
PCP UR QL SCN: NEGATIVE
PLATELET # BLD AUTO: 259 10*3/MM3 (ref 140–450)
PMV BLD AUTO: 10 FL (ref 6–12)
POTASSIUM SERPL-SCNC: 3.8 MMOL/L (ref 3.5–5.2)
PROPOXYPH UR QL: NEGATIVE
PROT SERPL-MCNC: 6 G/DL (ref 6–8.5)
PROTHROMBIN TIME: 12.5 SECONDS (ref 11.1–15.3)
RBC # BLD AUTO: 4.19 10*6/MM3 (ref 3.77–5.28)
RH BLD: NEGATIVE
SARS-COV-2 RNA RESP QL NAA+PROBE: NOT DETECTED
SODIUM SERPL-SCNC: 138 MMOL/L (ref 136–145)
T&S EXPIRATION DATE: NORMAL
TRICYCLICS UR QL SCN: NEGATIVE
WBC NRBC COR # BLD: 8.43 10*3/MM3 (ref 3.4–10.8)

## 2022-01-22 PROCEDURE — 86850 RBC ANTIBODY SCREEN: CPT | Performed by: STUDENT IN AN ORGANIZED HEALTH CARE EDUCATION/TRAINING PROGRAM

## 2022-01-22 PROCEDURE — 86901 BLOOD TYPING SEROLOGIC RH(D): CPT | Performed by: STUDENT IN AN ORGANIZED HEALTH CARE EDUCATION/TRAINING PROGRAM

## 2022-01-22 PROCEDURE — 0 MAGNESIUM SULFATE 20 GM/500ML SOLUTION: Performed by: STUDENT IN AN ORGANIZED HEALTH CARE EDUCATION/TRAINING PROGRAM

## 2022-01-22 PROCEDURE — 87653 STREP B DNA AMP PROBE: CPT | Performed by: STUDENT IN AN ORGANIZED HEALTH CARE EDUCATION/TRAINING PROGRAM

## 2022-01-22 PROCEDURE — 85610 PROTHROMBIN TIME: CPT | Performed by: STUDENT IN AN ORGANIZED HEALTH CARE EDUCATION/TRAINING PROGRAM

## 2022-01-22 PROCEDURE — 86870 RBC ANTIBODY IDENTIFICATION: CPT | Performed by: STUDENT IN AN ORGANIZED HEALTH CARE EDUCATION/TRAINING PROGRAM

## 2022-01-22 PROCEDURE — 99222 1ST HOSP IP/OBS MODERATE 55: CPT | Performed by: STUDENT IN AN ORGANIZED HEALTH CARE EDUCATION/TRAINING PROGRAM

## 2022-01-22 PROCEDURE — 86900 BLOOD TYPING SEROLOGIC ABO: CPT | Performed by: STUDENT IN AN ORGANIZED HEALTH CARE EDUCATION/TRAINING PROGRAM

## 2022-01-22 PROCEDURE — 85027 COMPLETE CBC AUTOMATED: CPT | Performed by: STUDENT IN AN ORGANIZED HEALTH CARE EDUCATION/TRAINING PROGRAM

## 2022-01-22 PROCEDURE — 0 MAGNESIUM SULFATE 4 GM/100ML SOLUTION

## 2022-01-22 PROCEDURE — 25010000002 BETAMETHASONE ACET & SOD PHOS PER 4 MG: Performed by: STUDENT IN AN ORGANIZED HEALTH CARE EDUCATION/TRAINING PROGRAM

## 2022-01-22 PROCEDURE — 87635 SARS-COV-2 COVID-19 AMP PRB: CPT | Performed by: STUDENT IN AN ORGANIZED HEALTH CARE EDUCATION/TRAINING PROGRAM

## 2022-01-22 PROCEDURE — 76816 OB US FOLLOW-UP PER FETUS: CPT

## 2022-01-22 PROCEDURE — 85730 THROMBOPLASTIN TIME PARTIAL: CPT | Performed by: STUDENT IN AN ORGANIZED HEALTH CARE EDUCATION/TRAINING PROGRAM

## 2022-01-22 PROCEDURE — 80053 COMPREHEN METABOLIC PANEL: CPT | Performed by: STUDENT IN AN ORGANIZED HEALTH CARE EDUCATION/TRAINING PROGRAM

## 2022-01-22 PROCEDURE — 36415 COLL VENOUS BLD VENIPUNCTURE: CPT | Performed by: STUDENT IN AN ORGANIZED HEALTH CARE EDUCATION/TRAINING PROGRAM

## 2022-01-22 PROCEDURE — 80306 DRUG TEST PRSMV INSTRMNT: CPT | Performed by: STUDENT IN AN ORGANIZED HEALTH CARE EDUCATION/TRAINING PROGRAM

## 2022-01-22 PROCEDURE — 85384 FIBRINOGEN ACTIVITY: CPT | Performed by: STUDENT IN AN ORGANIZED HEALTH CARE EDUCATION/TRAINING PROGRAM

## 2022-01-22 RX ORDER — ONDANSETRON 2 MG/ML
4 INJECTION INTRAMUSCULAR; INTRAVENOUS EVERY 8 HOURS PRN
Status: DISCONTINUED | OUTPATIENT
Start: 2022-01-22 | End: 2022-03-07

## 2022-01-22 RX ORDER — SODIUM CHLORIDE, SODIUM LACTATE, POTASSIUM CHLORIDE, CALCIUM CHLORIDE 600; 310; 30; 20 MG/100ML; MG/100ML; MG/100ML; MG/100ML
125 INJECTION, SOLUTION INTRAVENOUS CONTINUOUS
Status: DISCONTINUED | OUTPATIENT
Start: 2022-01-22 | End: 2022-01-23

## 2022-01-22 RX ORDER — SODIUM CHLORIDE 0.9 % (FLUSH) 0.9 %
10 SYRINGE (ML) INJECTION EVERY 12 HOURS SCHEDULED
Status: DISCONTINUED | OUTPATIENT
Start: 2022-01-22 | End: 2022-02-01

## 2022-01-22 RX ORDER — BETAMETHASONE SODIUM PHOSPHATE AND BETAMETHASONE ACETATE 3; 3 MG/ML; MG/ML
12 INJECTION, SUSPENSION INTRA-ARTICULAR; INTRALESIONAL; INTRAMUSCULAR; SOFT TISSUE EVERY 24 HOURS
Status: COMPLETED | OUTPATIENT
Start: 2022-01-22 | End: 2022-01-23

## 2022-01-22 RX ORDER — MAGNESIUM SULFATE HEPTAHYDRATE 40 MG/ML
INJECTION, SOLUTION INTRAVENOUS
Status: COMPLETED
Start: 2022-01-22 | End: 2022-01-22

## 2022-01-22 RX ORDER — ONDANSETRON 4 MG/1
4 TABLET, FILM COATED ORAL EVERY 8 HOURS PRN
Status: DISCONTINUED | OUTPATIENT
Start: 2022-01-22 | End: 2022-03-07

## 2022-01-22 RX ORDER — SODIUM CHLORIDE 0.9 % (FLUSH) 0.9 %
10 SYRINGE (ML) INJECTION AS NEEDED
Status: DISCONTINUED | OUTPATIENT
Start: 2022-01-22 | End: 2022-03-06

## 2022-01-22 RX ORDER — MAGNESIUM SULFATE HEPTAHYDRATE 40 MG/ML
2 INJECTION, SOLUTION INTRAVENOUS CONTINUOUS
Status: DISCONTINUED | OUTPATIENT
Start: 2022-01-22 | End: 2022-01-23

## 2022-01-22 RX ORDER — MAGNESIUM SULFATE HEPTAHYDRATE 40 MG/ML
4 INJECTION, SOLUTION INTRAVENOUS ONCE
Status: COMPLETED | OUTPATIENT
Start: 2022-01-22 | End: 2022-01-22

## 2022-01-22 RX ADMIN — MAGNESIUM SULFATE HEPTAHYDRATE 2 G/HR: 40 INJECTION, SOLUTION INTRAVENOUS at 14:17

## 2022-01-22 RX ADMIN — MAGNESIUM SULFATE HEPTAHYDRATE 4 G: 40 INJECTION, SOLUTION INTRAVENOUS at 13:45

## 2022-01-22 RX ADMIN — SODIUM CHLORIDE, POTASSIUM CHLORIDE, SODIUM LACTATE AND CALCIUM CHLORIDE 125 ML/HR: 600; 310; 30; 20 INJECTION, SOLUTION INTRAVENOUS at 13:24

## 2022-01-22 RX ADMIN — BETAMETHASONE SODIUM PHOSPHATE AND BETAMETHASONE ACETATE 12 MG: 3; 3 INJECTION, SUSPENSION INTRA-ARTICULAR; INTRALESIONAL; INTRAMUSCULAR at 13:57

## 2022-01-23 ENCOUNTER — APPOINTMENT (OUTPATIENT)
Dept: ULTRASOUND IMAGING | Facility: HOSPITAL | Age: 37
End: 2022-01-23

## 2022-01-23 LAB
DEPRECATED RDW RBC AUTO: 40.2 FL (ref 37–54)
ERYTHROCYTE [DISTWIDTH] IN BLOOD BY AUTOMATED COUNT: 13.8 % (ref 12.3–15.4)
FETAL BLEED: NEGATIVE
GLUCOSE BLDC GLUCOMTR-MCNC: 176 MG/DL (ref 70–130)
GLUCOSE BLDC GLUCOMTR-MCNC: 187 MG/DL (ref 70–130)
GLUCOSE BLDC GLUCOMTR-MCNC: 192 MG/DL (ref 70–130)
GLUCOSE BLDC GLUCOMTR-MCNC: 197 MG/DL (ref 70–130)
HCT VFR BLD AUTO: 31.9 % (ref 34–46.6)
HGB BLD-MCNC: 10.6 G/DL (ref 12–15.9)
HOLD SPECIMEN: NORMAL
MCH RBC QN AUTO: 27 PG (ref 26.6–33)
MCHC RBC AUTO-ENTMCNC: 33.2 G/DL (ref 31.5–35.7)
MCV RBC AUTO: 81.2 FL (ref 79–97)
PLATELET # BLD AUTO: 262 10*3/MM3 (ref 140–450)
PMV BLD AUTO: 10.2 FL (ref 6–12)
RBC # BLD AUTO: 3.93 10*6/MM3 (ref 3.77–5.28)
WBC NRBC COR # BLD: 12.94 10*3/MM3 (ref 3.4–10.8)

## 2022-01-23 PROCEDURE — 25010000002 BETAMETHASONE ACET & SOD PHOS PER 4 MG: Performed by: STUDENT IN AN ORGANIZED HEALTH CARE EDUCATION/TRAINING PROGRAM

## 2022-01-23 PROCEDURE — 76819 FETAL BIOPHYS PROFIL W/O NST: CPT

## 2022-01-23 PROCEDURE — 85027 COMPLETE CBC AUTOMATED: CPT | Performed by: STUDENT IN AN ORGANIZED HEALTH CARE EDUCATION/TRAINING PROGRAM

## 2022-01-23 PROCEDURE — 63710000001 DIPHENHYDRAMINE PER 50 MG

## 2022-01-23 PROCEDURE — 25010000002 RHO D IMMUNE GLOBULIN 1500 UNITS SOLUTION PREFILLED SYRINGE: Performed by: STUDENT IN AN ORGANIZED HEALTH CARE EDUCATION/TRAINING PROGRAM

## 2022-01-23 PROCEDURE — 85461 HEMOGLOBIN FETAL: CPT | Performed by: STUDENT IN AN ORGANIZED HEALTH CARE EDUCATION/TRAINING PROGRAM

## 2022-01-23 PROCEDURE — 59025 FETAL NON-STRESS TEST: CPT | Performed by: STUDENT IN AN ORGANIZED HEALTH CARE EDUCATION/TRAINING PROGRAM

## 2022-01-23 PROCEDURE — 59025 FETAL NON-STRESS TEST: CPT

## 2022-01-23 PROCEDURE — 76820 UMBILICAL ARTERY ECHO: CPT

## 2022-01-23 PROCEDURE — 99232 SBSQ HOSP IP/OBS MODERATE 35: CPT | Performed by: STUDENT IN AN ORGANIZED HEALTH CARE EDUCATION/TRAINING PROGRAM

## 2022-01-23 RX ORDER — DIPHENHYDRAMINE HCL 25 MG
25 CAPSULE ORAL ONCE AS NEEDED
Status: COMPLETED | OUTPATIENT
Start: 2022-01-23 | End: 2022-01-23

## 2022-01-23 RX ORDER — DIPHENHYDRAMINE HCL 25 MG
CAPSULE ORAL
Status: COMPLETED
Start: 2022-01-23 | End: 2022-01-23

## 2022-01-23 RX ADMIN — BETAMETHASONE SODIUM PHOSPHATE AND BETAMETHASONE ACETATE 12 MG: 3; 3 INJECTION, SUSPENSION INTRA-ARTICULAR; INTRALESIONAL; INTRAMUSCULAR at 15:41

## 2022-01-23 RX ADMIN — Medication 25 MG: at 05:41

## 2022-01-23 RX ADMIN — DIPHENHYDRAMINE HYDROCHLORIDE 25 MG: 25 CAPSULE ORAL at 05:41

## 2022-01-23 RX ADMIN — RHO(D) IMMUNE GLOBULIN (HUMAN) 1500 UNITS: 1500 SOLUTION INTRAMUSCULAR at 17:56

## 2022-01-23 RX ADMIN — SODIUM CHLORIDE, POTASSIUM CHLORIDE, SODIUM LACTATE AND CALCIUM CHLORIDE 125 ML/HR: 600; 310; 30; 20 INJECTION, SOLUTION INTRAVENOUS at 02:00

## 2022-01-24 ENCOUNTER — TELEPHONE (OUTPATIENT)
Dept: OBSTETRICS AND GYNECOLOGY | Facility: CLINIC | Age: 37
End: 2022-01-24

## 2022-01-24 LAB
DEPRECATED RDW RBC AUTO: 40.5 FL (ref 37–54)
ERYTHROCYTE [DISTWIDTH] IN BLOOD BY AUTOMATED COUNT: 13.9 % (ref 12.3–15.4)
GLUCOSE BLDC GLUCOMTR-MCNC: 123 MG/DL (ref 70–130)
GLUCOSE BLDC GLUCOMTR-MCNC: 147 MG/DL (ref 70–130)
GLUCOSE BLDC GLUCOMTR-MCNC: 160 MG/DL (ref 70–130)
GLUCOSE BLDC GLUCOMTR-MCNC: 228 MG/DL (ref 70–130)
GLUCOSE BLDC GLUCOMTR-MCNC: 82 MG/DL (ref 70–130)
HCT VFR BLD AUTO: 28.6 % (ref 34–46.6)
HGB BLD-MCNC: 9.5 G/DL (ref 12–15.9)
MCH RBC QN AUTO: 26.8 PG (ref 26.6–33)
MCHC RBC AUTO-ENTMCNC: 33.2 G/DL (ref 31.5–35.7)
MCV RBC AUTO: 80.6 FL (ref 79–97)
PLATELET # BLD AUTO: 221 10*3/MM3 (ref 140–450)
PMV BLD AUTO: 10.1 FL (ref 6–12)
RBC # BLD AUTO: 3.55 10*6/MM3 (ref 3.77–5.28)
WBC NRBC COR # BLD: 11.95 10*3/MM3 (ref 3.4–10.8)

## 2022-01-24 PROCEDURE — 85027 COMPLETE CBC AUTOMATED: CPT | Performed by: STUDENT IN AN ORGANIZED HEALTH CARE EDUCATION/TRAINING PROGRAM

## 2022-01-24 PROCEDURE — 82962 GLUCOSE BLOOD TEST: CPT

## 2022-01-24 PROCEDURE — 59025 FETAL NON-STRESS TEST: CPT | Performed by: STUDENT IN AN ORGANIZED HEALTH CARE EDUCATION/TRAINING PROGRAM

## 2022-01-24 PROCEDURE — 99232 SBSQ HOSP IP/OBS MODERATE 35: CPT | Performed by: STUDENT IN AN ORGANIZED HEALTH CARE EDUCATION/TRAINING PROGRAM

## 2022-01-24 RX ORDER — PRENATAL VIT/IRON FUM/FOLIC AC 27MG-0.8MG
1 TABLET ORAL DAILY
Status: DISCONTINUED | OUTPATIENT
Start: 2022-01-25 | End: 2022-03-07

## 2022-01-24 RX ADMIN — SODIUM CHLORIDE, PRESERVATIVE FREE 10 ML: 5 INJECTION INTRAVENOUS at 07:18

## 2022-01-24 RX ADMIN — SODIUM CHLORIDE, PRESERVATIVE FREE 10 ML: 5 INJECTION INTRAVENOUS at 09:00

## 2022-01-24 NOTE — TELEPHONE ENCOUNTER
Patient called with questions regarding a referral for a breast pump. She requested a returned call to #[phone].     Thanks,  Linh

## 2022-01-24 NOTE — TELEPHONE ENCOUNTER
Patient called about getting a breast pump. She said that she hadn't heard anything from the company yet. I told her that normally they call closer to the due date. I told her I would check to make sure an order was sent in and let her know. I checked and one hadn't been sent so I sent one in for the patient.

## 2022-01-25 LAB
GLUCOSE BLDC GLUCOMTR-MCNC: 107 MG/DL (ref 70–130)
GLUCOSE BLDC GLUCOMTR-MCNC: 141 MG/DL (ref 70–130)
GLUCOSE BLDC GLUCOMTR-MCNC: 190 MG/DL (ref 70–130)
GLUCOSE BLDC GLUCOMTR-MCNC: 212 MG/DL (ref 70–130)
GLUCOSE BLDC GLUCOMTR-MCNC: 96 MG/DL (ref 70–130)
GROUP B STREP, DNA: NEGATIVE

## 2022-01-25 PROCEDURE — 59025 FETAL NON-STRESS TEST: CPT | Performed by: STUDENT IN AN ORGANIZED HEALTH CARE EDUCATION/TRAINING PROGRAM

## 2022-01-25 PROCEDURE — 59025 FETAL NON-STRESS TEST: CPT | Performed by: OBSTETRICS & GYNECOLOGY

## 2022-01-25 PROCEDURE — 82962 GLUCOSE BLOOD TEST: CPT

## 2022-01-25 PROCEDURE — 99232 SBSQ HOSP IP/OBS MODERATE 35: CPT | Performed by: OBSTETRICS & GYNECOLOGY

## 2022-01-25 RX ORDER — ZOLPIDEM TARTRATE 5 MG/1
5 TABLET ORAL NIGHTLY PRN
Status: ACTIVE | OUTPATIENT
Start: 2022-01-25 | End: 2022-02-01

## 2022-01-25 RX ADMIN — SODIUM CHLORIDE, PRESERVATIVE FREE 10 ML: 5 INJECTION INTRAVENOUS at 08:56

## 2022-01-25 RX ADMIN — SODIUM CHLORIDE, PRESERVATIVE FREE 10 ML: 5 INJECTION INTRAVENOUS at 21:14

## 2022-01-25 RX ADMIN — Medication 1 TABLET: at 08:57

## 2022-01-26 LAB
GLUCOSE BLDC GLUCOMTR-MCNC: 133 MG/DL (ref 70–130)
GLUCOSE BLDC GLUCOMTR-MCNC: 138 MG/DL (ref 70–130)
GLUCOSE BLDC GLUCOMTR-MCNC: 178 MG/DL (ref 70–130)
GLUCOSE BLDC GLUCOMTR-MCNC: 98 MG/DL (ref 70–130)

## 2022-01-26 PROCEDURE — 99232 SBSQ HOSP IP/OBS MODERATE 35: CPT | Performed by: OBSTETRICS & GYNECOLOGY

## 2022-01-26 PROCEDURE — 59025 FETAL NON-STRESS TEST: CPT | Performed by: OBSTETRICS & GYNECOLOGY

## 2022-01-26 PROCEDURE — 63710000001 DIPHENHYDRAMINE PER 50 MG: Performed by: OBSTETRICS & GYNECOLOGY

## 2022-01-26 PROCEDURE — 82962 GLUCOSE BLOOD TEST: CPT

## 2022-01-26 RX ORDER — ECHINACEA PURPUREA EXTRACT 125 MG
2 TABLET ORAL AS NEEDED
Status: DISCONTINUED | OUTPATIENT
Start: 2022-01-26 | End: 2022-03-07

## 2022-01-26 RX ORDER — DIPHENHYDRAMINE HYDROCHLORIDE 25 MG/1
25 CAPSULE ORAL NIGHTLY
Status: DISCONTINUED | OUTPATIENT
Start: 2022-01-26 | End: 2022-02-12

## 2022-01-26 RX ORDER — ACETAMINOPHEN 500 MG
1000 TABLET ORAL EVERY 6 HOURS PRN
Status: DISCONTINUED | OUTPATIENT
Start: 2022-01-26 | End: 2022-03-07

## 2022-01-26 RX ORDER — DIPHENHYDRAMINE HCL 25 MG
25 CAPSULE ORAL EVERY 6 HOURS PRN
Status: DISCONTINUED | OUTPATIENT
Start: 2022-01-26 | End: 2022-03-07

## 2022-01-26 RX ORDER — DIPHENHYDRAMINE HCL 25 MG
25 CAPSULE ORAL NIGHTLY
Status: DISCONTINUED | OUTPATIENT
Start: 2022-01-26 | End: 2022-02-12

## 2022-01-26 RX ADMIN — Medication 1 TABLET: at 08:07

## 2022-01-26 RX ADMIN — SODIUM CHLORIDE, PRESERVATIVE FREE 10 ML: 5 INJECTION INTRAVENOUS at 22:00

## 2022-01-26 RX ADMIN — SODIUM CHLORIDE, PRESERVATIVE FREE 10 ML: 5 INJECTION INTRAVENOUS at 08:07

## 2022-01-26 RX ADMIN — Medication 25 MG: at 22:48

## 2022-01-26 RX ADMIN — DIPHENHYDRAMINE HYDROCHLORIDE 25 MG: 25 CAPSULE ORAL at 22:47

## 2022-01-26 RX ADMIN — ACETAMINOPHEN 1000 MG: 500 TABLET, FILM COATED ORAL at 15:27

## 2022-01-27 ENCOUNTER — APPOINTMENT (OUTPATIENT)
Dept: ULTRASOUND IMAGING | Facility: HOSPITAL | Age: 37
End: 2022-01-27

## 2022-01-27 LAB
ALBUMIN SERPL-MCNC: 3.8 G/DL (ref 3.5–5.2)
ALBUMIN/GLOB SERPL: 1.7 G/DL
ALP SERPL-CCNC: 94 U/L (ref 39–117)
ALT SERPL W P-5'-P-CCNC: 13 U/L (ref 1–33)
ANION GAP SERPL CALCULATED.3IONS-SCNC: 7 MMOL/L (ref 5–15)
AST SERPL-CCNC: 15 U/L (ref 1–32)
BILIRUB SERPL-MCNC: <0.2 MG/DL (ref 0–1.2)
BUN SERPL-MCNC: 8 MG/DL (ref 6–20)
BUN/CREAT SERPL: 12.5 (ref 7–25)
CALCIUM SPEC-SCNC: 9.1 MG/DL (ref 8.6–10.5)
CHLORIDE SERPL-SCNC: 104 MMOL/L (ref 98–107)
CO2 SERPL-SCNC: 26 MMOL/L (ref 22–29)
CREAT SERPL-MCNC: 0.64 MG/DL (ref 0.57–1)
DEPRECATED RDW RBC AUTO: 41 FL (ref 37–54)
ERYTHROCYTE [DISTWIDTH] IN BLOOD BY AUTOMATED COUNT: 14 % (ref 12.3–15.4)
GFR SERPL CREATININE-BSD FRML MDRD: 105 ML/MIN/1.73
GLOBULIN UR ELPH-MCNC: 2.2 GM/DL
GLUCOSE BLDC GLUCOMTR-MCNC: 153 MG/DL (ref 70–130)
GLUCOSE BLDC GLUCOMTR-MCNC: 158 MG/DL (ref 70–130)
GLUCOSE BLDC GLUCOMTR-MCNC: 188 MG/DL (ref 70–130)
GLUCOSE BLDC GLUCOMTR-MCNC: 88 MG/DL (ref 70–130)
GLUCOSE SERPL-MCNC: 101 MG/DL (ref 65–99)
HCT VFR BLD AUTO: 33 % (ref 34–46.6)
HGB BLD-MCNC: 10.8 G/DL (ref 12–15.9)
MCH RBC QN AUTO: 26.7 PG (ref 26.6–33)
MCHC RBC AUTO-ENTMCNC: 32.7 G/DL (ref 31.5–35.7)
MCV RBC AUTO: 81.5 FL (ref 79–97)
PLATELET # BLD AUTO: 246 10*3/MM3 (ref 140–450)
PMV BLD AUTO: 10.3 FL (ref 6–12)
POTASSIUM SERPL-SCNC: 3.9 MMOL/L (ref 3.5–5.2)
PROT SERPL-MCNC: 6 G/DL (ref 6–8.5)
RBC # BLD AUTO: 4.05 10*6/MM3 (ref 3.77–5.28)
SODIUM SERPL-SCNC: 137 MMOL/L (ref 136–145)
WBC NRBC COR # BLD: 9.76 10*3/MM3 (ref 3.4–10.8)

## 2022-01-27 PROCEDURE — 99232 SBSQ HOSP IP/OBS MODERATE 35: CPT | Performed by: OBSTETRICS & GYNECOLOGY

## 2022-01-27 PROCEDURE — 85027 COMPLETE CBC AUTOMATED: CPT | Performed by: STUDENT IN AN ORGANIZED HEALTH CARE EDUCATION/TRAINING PROGRAM

## 2022-01-27 PROCEDURE — 59025 FETAL NON-STRESS TEST: CPT | Performed by: OBSTETRICS & GYNECOLOGY

## 2022-01-27 PROCEDURE — 80053 COMPREHEN METABOLIC PANEL: CPT | Performed by: STUDENT IN AN ORGANIZED HEALTH CARE EDUCATION/TRAINING PROGRAM

## 2022-01-27 PROCEDURE — 82962 GLUCOSE BLOOD TEST: CPT

## 2022-01-27 PROCEDURE — 82239 BILE ACIDS TOTAL: CPT | Performed by: STUDENT IN AN ORGANIZED HEALTH CARE EDUCATION/TRAINING PROGRAM

## 2022-01-27 PROCEDURE — 63710000001 DIPHENHYDRAMINE PER 50 MG: Performed by: OBSTETRICS & GYNECOLOGY

## 2022-01-27 PROCEDURE — 76819 FETAL BIOPHYS PROFIL W/O NST: CPT

## 2022-01-27 PROCEDURE — 76820 UMBILICAL ARTERY ECHO: CPT

## 2022-01-27 RX ADMIN — DIPHENHYDRAMINE HYDROCHLORIDE 25 MG: 25 CAPSULE ORAL at 22:01

## 2022-01-27 RX ADMIN — Medication 1 TABLET: at 08:24

## 2022-01-27 RX ADMIN — Medication 25 MG: at 22:01

## 2022-01-28 LAB
BILE AC SERPL-SCNC: 7.9 UMOL/L (ref 0–10)
GLUCOSE BLDC GLUCOMTR-MCNC: 105 MG/DL (ref 70–130)
GLUCOSE BLDC GLUCOMTR-MCNC: 215 MG/DL (ref 70–130)
GLUCOSE BLDC GLUCOMTR-MCNC: 215 MG/DL (ref 70–130)
GLUCOSE BLDC GLUCOMTR-MCNC: 99 MG/DL (ref 70–130)

## 2022-01-28 PROCEDURE — 82962 GLUCOSE BLOOD TEST: CPT

## 2022-01-28 PROCEDURE — 99232 SBSQ HOSP IP/OBS MODERATE 35: CPT | Performed by: OBSTETRICS & GYNECOLOGY

## 2022-01-28 PROCEDURE — 63710000001 DIPHENHYDRAMINE PER 50 MG: Performed by: OBSTETRICS & GYNECOLOGY

## 2022-01-28 PROCEDURE — 59025 FETAL NON-STRESS TEST: CPT | Performed by: STUDENT IN AN ORGANIZED HEALTH CARE EDUCATION/TRAINING PROGRAM

## 2022-01-28 RX ADMIN — Medication 25 MG: at 21:25

## 2022-01-28 RX ADMIN — DIPHENHYDRAMINE HYDROCHLORIDE 25 MG: 25 CAPSULE ORAL at 21:25

## 2022-01-28 RX ADMIN — Medication 1 TABLET: at 08:27

## 2022-01-29 LAB
GLUCOSE BLDC GLUCOMTR-MCNC: 101 MG/DL (ref 70–130)
GLUCOSE BLDC GLUCOMTR-MCNC: 105 MG/DL (ref 70–130)
GLUCOSE BLDC GLUCOMTR-MCNC: 166 MG/DL (ref 70–130)
GLUCOSE BLDC GLUCOMTR-MCNC: 93 MG/DL (ref 70–130)

## 2022-01-29 PROCEDURE — 63710000001 DIPHENHYDRAMINE PER 50 MG: Performed by: OBSTETRICS & GYNECOLOGY

## 2022-01-29 PROCEDURE — 59025 FETAL NON-STRESS TEST: CPT | Performed by: STUDENT IN AN ORGANIZED HEALTH CARE EDUCATION/TRAINING PROGRAM

## 2022-01-29 PROCEDURE — 82962 GLUCOSE BLOOD TEST: CPT

## 2022-01-29 PROCEDURE — 99231 SBSQ HOSP IP/OBS SF/LOW 25: CPT | Performed by: OBSTETRICS & GYNECOLOGY

## 2022-01-29 RX ADMIN — Medication 1 TABLET: at 09:29

## 2022-01-29 RX ADMIN — Medication 25 MG: at 21:13

## 2022-01-29 RX ADMIN — DIPHENHYDRAMINE HYDROCHLORIDE 25 MG: 25 CAPSULE ORAL at 21:13

## 2022-01-30 LAB
GLUCOSE BLDC GLUCOMTR-MCNC: 106 MG/DL (ref 70–130)
GLUCOSE BLDC GLUCOMTR-MCNC: 116 MG/DL (ref 70–130)
GLUCOSE BLDC GLUCOMTR-MCNC: 139 MG/DL (ref 70–130)
GLUCOSE BLDC GLUCOMTR-MCNC: 139 MG/DL (ref 70–130)
GLUCOSE BLDC GLUCOMTR-MCNC: 160 MG/DL (ref 70–130)

## 2022-01-30 PROCEDURE — 99231 SBSQ HOSP IP/OBS SF/LOW 25: CPT | Performed by: OBSTETRICS & GYNECOLOGY

## 2022-01-30 PROCEDURE — 59025 FETAL NON-STRESS TEST: CPT | Performed by: OBSTETRICS & GYNECOLOGY

## 2022-01-30 PROCEDURE — 63710000001 DIPHENHYDRAMINE PER 50 MG: Performed by: OBSTETRICS & GYNECOLOGY

## 2022-01-30 PROCEDURE — 59025 FETAL NON-STRESS TEST: CPT

## 2022-01-30 PROCEDURE — 82962 GLUCOSE BLOOD TEST: CPT

## 2022-01-30 RX ADMIN — Medication 1 TABLET: at 08:13

## 2022-01-30 RX ADMIN — Medication 25 MG: at 21:45

## 2022-01-30 RX ADMIN — DIPHENHYDRAMINE HYDROCHLORIDE 25 MG: 25 CAPSULE ORAL at 21:46

## 2022-01-31 LAB
GLUCOSE BLDC GLUCOMTR-MCNC: 127 MG/DL (ref 70–130)
GLUCOSE BLDC GLUCOMTR-MCNC: 176 MG/DL (ref 70–130)
GLUCOSE BLDC GLUCOMTR-MCNC: 178 MG/DL (ref 70–130)
GLUCOSE BLDC GLUCOMTR-MCNC: 188 MG/DL (ref 70–130)
GLUCOSE BLDC GLUCOMTR-MCNC: 88 MG/DL (ref 70–130)

## 2022-01-31 PROCEDURE — 99232 SBSQ HOSP IP/OBS MODERATE 35: CPT | Performed by: OBSTETRICS & GYNECOLOGY

## 2022-01-31 PROCEDURE — 82962 GLUCOSE BLOOD TEST: CPT

## 2022-01-31 PROCEDURE — 63710000001 DIPHENHYDRAMINE PER 50 MG: Performed by: OBSTETRICS & GYNECOLOGY

## 2022-01-31 PROCEDURE — 59025 FETAL NON-STRESS TEST: CPT | Performed by: OBSTETRICS & GYNECOLOGY

## 2022-01-31 RX ADMIN — Medication 25 MG: at 22:05

## 2022-01-31 RX ADMIN — Medication 1 TABLET: at 08:24

## 2022-01-31 RX ADMIN — DIPHENHYDRAMINE HYDROCHLORIDE 25 MG: 25 CAPSULE ORAL at 22:05

## 2022-02-01 ENCOUNTER — APPOINTMENT (OUTPATIENT)
Dept: ULTRASOUND IMAGING | Facility: HOSPITAL | Age: 37
End: 2022-02-01

## 2022-02-01 LAB
GLUCOSE BLDC GLUCOMTR-MCNC: 112 MG/DL (ref 70–130)
GLUCOSE BLDC GLUCOMTR-MCNC: 126 MG/DL (ref 70–130)
GLUCOSE BLDC GLUCOMTR-MCNC: 155 MG/DL (ref 70–130)
GLUCOSE BLDC GLUCOMTR-MCNC: 95 MG/DL (ref 70–130)

## 2022-02-01 PROCEDURE — 76819 FETAL BIOPHYS PROFIL W/O NST: CPT

## 2022-02-01 PROCEDURE — 99232 SBSQ HOSP IP/OBS MODERATE 35: CPT | Performed by: OBSTETRICS & GYNECOLOGY

## 2022-02-01 PROCEDURE — 76820 UMBILICAL ARTERY ECHO: CPT

## 2022-02-01 PROCEDURE — 59025 FETAL NON-STRESS TEST: CPT | Performed by: OBSTETRICS & GYNECOLOGY

## 2022-02-01 PROCEDURE — 59025 FETAL NON-STRESS TEST: CPT

## 2022-02-01 PROCEDURE — 82962 GLUCOSE BLOOD TEST: CPT

## 2022-02-01 RX ADMIN — Medication 1 TABLET: at 08:39

## 2022-02-01 RX ADMIN — Medication 25 MG: at 20:23

## 2022-02-02 LAB
GLUCOSE BLDC GLUCOMTR-MCNC: 109 MG/DL (ref 70–130)
GLUCOSE BLDC GLUCOMTR-MCNC: 113 MG/DL (ref 70–130)
GLUCOSE BLDC GLUCOMTR-MCNC: 125 MG/DL (ref 70–130)
GLUCOSE BLDC GLUCOMTR-MCNC: 92 MG/DL (ref 70–130)

## 2022-02-02 PROCEDURE — 82962 GLUCOSE BLOOD TEST: CPT

## 2022-02-02 PROCEDURE — 59025 FETAL NON-STRESS TEST: CPT | Performed by: OBSTETRICS & GYNECOLOGY

## 2022-02-02 PROCEDURE — 59025 FETAL NON-STRESS TEST: CPT

## 2022-02-02 PROCEDURE — 63710000001 DIPHENHYDRAMINE PER 50 MG: Performed by: OBSTETRICS & GYNECOLOGY

## 2022-02-02 RX ORDER — DIPHENHYDRAMINE HYDROCHLORIDE 25 MG/1
25 CAPSULE ORAL ONCE
Status: COMPLETED | OUTPATIENT
Start: 2022-02-02 | End: 2022-02-02

## 2022-02-02 RX ADMIN — Medication 1 TABLET: at 10:10

## 2022-02-02 RX ADMIN — DIPHENHYDRAMINE HYDROCHLORIDE 25 MG: 25 CAPSULE ORAL at 15:01

## 2022-02-02 RX ADMIN — Medication 25 MG: at 22:27

## 2022-02-02 RX ADMIN — Medication 25 MG: at 15:01

## 2022-02-02 RX ADMIN — ACETAMINOPHEN 1000 MG: 500 TABLET, FILM COATED ORAL at 13:56

## 2022-02-02 RX ADMIN — DIPHENHYDRAMINE HYDROCHLORIDE 25 MG: 25 CAPSULE ORAL at 22:27

## 2022-02-03 ENCOUNTER — APPOINTMENT (OUTPATIENT)
Dept: ULTRASOUND IMAGING | Facility: HOSPITAL | Age: 37
End: 2022-02-03

## 2022-02-03 LAB
GLUCOSE BLDC GLUCOMTR-MCNC: 100 MG/DL (ref 70–130)
GLUCOSE BLDC GLUCOMTR-MCNC: 132 MG/DL (ref 70–130)
GLUCOSE BLDC GLUCOMTR-MCNC: 149 MG/DL (ref 70–130)
GLUCOSE BLDC GLUCOMTR-MCNC: 97 MG/DL (ref 70–130)

## 2022-02-03 PROCEDURE — 59025 FETAL NON-STRESS TEST: CPT

## 2022-02-03 PROCEDURE — 59025 FETAL NON-STRESS TEST: CPT | Performed by: OBSTETRICS & GYNECOLOGY

## 2022-02-03 PROCEDURE — 82962 GLUCOSE BLOOD TEST: CPT

## 2022-02-03 PROCEDURE — 99232 SBSQ HOSP IP/OBS MODERATE 35: CPT | Performed by: OBSTETRICS & GYNECOLOGY

## 2022-02-03 PROCEDURE — 76820 UMBILICAL ARTERY ECHO: CPT

## 2022-02-03 PROCEDURE — 76819 FETAL BIOPHYS PROFIL W/O NST: CPT

## 2022-02-03 RX ADMIN — Medication 1 TABLET: at 09:55

## 2022-02-03 RX ADMIN — ACETAMINOPHEN 1000 MG: 500 TABLET, FILM COATED ORAL at 15:31

## 2022-02-04 LAB
GLUCOSE BLDC GLUCOMTR-MCNC: 105 MG/DL (ref 70–130)
GLUCOSE BLDC GLUCOMTR-MCNC: 105 MG/DL (ref 70–130)
GLUCOSE BLDC GLUCOMTR-MCNC: 116 MG/DL (ref 70–130)
GLUCOSE BLDC GLUCOMTR-MCNC: 116 MG/DL (ref 70–130)
GLUCOSE BLDC GLUCOMTR-MCNC: 119 MG/DL (ref 70–130)

## 2022-02-04 PROCEDURE — 99232 SBSQ HOSP IP/OBS MODERATE 35: CPT | Performed by: OBSTETRICS & GYNECOLOGY

## 2022-02-04 PROCEDURE — 59025 FETAL NON-STRESS TEST: CPT | Performed by: OBSTETRICS & GYNECOLOGY

## 2022-02-04 PROCEDURE — 59025 FETAL NON-STRESS TEST: CPT

## 2022-02-04 PROCEDURE — 82962 GLUCOSE BLOOD TEST: CPT

## 2022-02-04 RX ADMIN — Medication 1 TABLET: at 09:28

## 2022-02-05 LAB
GLUCOSE BLDC GLUCOMTR-MCNC: 100 MG/DL (ref 70–130)
GLUCOSE BLDC GLUCOMTR-MCNC: 100 MG/DL (ref 70–130)
GLUCOSE BLDC GLUCOMTR-MCNC: 110 MG/DL (ref 70–130)
GLUCOSE BLDC GLUCOMTR-MCNC: 129 MG/DL (ref 70–130)
GLUCOSE BLDC GLUCOMTR-MCNC: 129 MG/DL (ref 70–130)
GLUCOSE BLDC GLUCOMTR-MCNC: 138 MG/DL (ref 70–130)

## 2022-02-05 PROCEDURE — 59025 FETAL NON-STRESS TEST: CPT

## 2022-02-05 PROCEDURE — 59025 FETAL NON-STRESS TEST: CPT | Performed by: OBSTETRICS & GYNECOLOGY

## 2022-02-05 PROCEDURE — 82962 GLUCOSE BLOOD TEST: CPT

## 2022-02-05 PROCEDURE — 99232 SBSQ HOSP IP/OBS MODERATE 35: CPT | Performed by: OBSTETRICS & GYNECOLOGY

## 2022-02-05 RX ADMIN — Medication 1 TABLET: at 09:20

## 2022-02-06 LAB
GLUCOSE BLDC GLUCOMTR-MCNC: 112 MG/DL (ref 70–130)
GLUCOSE BLDC GLUCOMTR-MCNC: 117 MG/DL (ref 70–130)
GLUCOSE BLDC GLUCOMTR-MCNC: 88 MG/DL (ref 70–130)
GLUCOSE BLDC GLUCOMTR-MCNC: 88 MG/DL (ref 70–130)
GLUCOSE BLDC GLUCOMTR-MCNC: 98 MG/DL (ref 70–130)
GLUCOSE BLDC GLUCOMTR-MCNC: 98 MG/DL (ref 70–130)

## 2022-02-06 PROCEDURE — 99232 SBSQ HOSP IP/OBS MODERATE 35: CPT | Performed by: OBSTETRICS & GYNECOLOGY

## 2022-02-06 PROCEDURE — 82962 GLUCOSE BLOOD TEST: CPT

## 2022-02-06 PROCEDURE — 59025 FETAL NON-STRESS TEST: CPT

## 2022-02-06 PROCEDURE — 59025 FETAL NON-STRESS TEST: CPT | Performed by: OBSTETRICS & GYNECOLOGY

## 2022-02-06 RX ADMIN — Medication 1 TABLET: at 09:39

## 2022-02-07 ENCOUNTER — APPOINTMENT (OUTPATIENT)
Dept: ULTRASOUND IMAGING | Facility: HOSPITAL | Age: 37
End: 2022-02-07

## 2022-02-07 LAB
GLUCOSE BLDC GLUCOMTR-MCNC: 102 MG/DL (ref 70–130)
GLUCOSE BLDC GLUCOMTR-MCNC: 102 MG/DL (ref 70–130)
GLUCOSE BLDC GLUCOMTR-MCNC: 126 MG/DL (ref 70–130)
GLUCOSE BLDC GLUCOMTR-MCNC: 140 MG/DL (ref 70–130)
GLUCOSE BLDC GLUCOMTR-MCNC: 142 MG/DL (ref 70–130)

## 2022-02-07 PROCEDURE — 59025 FETAL NON-STRESS TEST: CPT

## 2022-02-07 PROCEDURE — 76819 FETAL BIOPHYS PROFIL W/O NST: CPT

## 2022-02-07 PROCEDURE — 59025 FETAL NON-STRESS TEST: CPT | Performed by: OBSTETRICS & GYNECOLOGY

## 2022-02-07 PROCEDURE — 76820 UMBILICAL ARTERY ECHO: CPT

## 2022-02-07 PROCEDURE — 76816 OB US FOLLOW-UP PER FETUS: CPT

## 2022-02-07 PROCEDURE — 82962 GLUCOSE BLOOD TEST: CPT

## 2022-02-07 PROCEDURE — 59025 FETAL NON-STRESS TEST: CPT | Performed by: STUDENT IN AN ORGANIZED HEALTH CARE EDUCATION/TRAINING PROGRAM

## 2022-02-07 PROCEDURE — 99232 SBSQ HOSP IP/OBS MODERATE 35: CPT | Performed by: OBSTETRICS & GYNECOLOGY

## 2022-02-07 RX ADMIN — Medication 1 TABLET: at 09:26

## 2022-02-08 LAB
GLUCOSE BLDC GLUCOMTR-MCNC: 112 MG/DL (ref 70–130)
GLUCOSE BLDC GLUCOMTR-MCNC: 118 MG/DL (ref 70–130)
GLUCOSE BLDC GLUCOMTR-MCNC: 88 MG/DL (ref 70–130)
GLUCOSE BLDC GLUCOMTR-MCNC: 97 MG/DL (ref 70–130)

## 2022-02-08 PROCEDURE — 99232 SBSQ HOSP IP/OBS MODERATE 35: CPT | Performed by: OBSTETRICS & GYNECOLOGY

## 2022-02-08 PROCEDURE — 59025 FETAL NON-STRESS TEST: CPT | Performed by: STUDENT IN AN ORGANIZED HEALTH CARE EDUCATION/TRAINING PROGRAM

## 2022-02-08 PROCEDURE — 59025 FETAL NON-STRESS TEST: CPT

## 2022-02-08 PROCEDURE — 82962 GLUCOSE BLOOD TEST: CPT

## 2022-02-08 RX ADMIN — Medication 1 TABLET: at 08:55

## 2022-02-09 LAB
BILIRUB UR QL STRIP: NEGATIVE
CLARITY UR: CLEAR
COLOR UR: YELLOW
GLUCOSE BLDC GLUCOMTR-MCNC: 100 MG/DL (ref 70–130)
GLUCOSE BLDC GLUCOMTR-MCNC: 107 MG/DL (ref 70–130)
GLUCOSE BLDC GLUCOMTR-MCNC: 88 MG/DL (ref 70–130)
GLUCOSE UR STRIP-MCNC: NEGATIVE MG/DL
HGB UR QL STRIP.AUTO: NEGATIVE
KETONES UR QL STRIP: ABNORMAL
LEUKOCYTE ESTERASE UR QL STRIP.AUTO: NEGATIVE
NITRITE UR QL STRIP: NEGATIVE
PH UR STRIP.AUTO: <=5 [PH] (ref 5–9)
PROT UR QL STRIP: NEGATIVE
SP GR UR STRIP: 1.02 (ref 1–1.03)
UROBILINOGEN UR QL STRIP: ABNORMAL

## 2022-02-09 PROCEDURE — 59025 FETAL NON-STRESS TEST: CPT

## 2022-02-09 PROCEDURE — 59025 FETAL NON-STRESS TEST: CPT | Performed by: STUDENT IN AN ORGANIZED HEALTH CARE EDUCATION/TRAINING PROGRAM

## 2022-02-09 PROCEDURE — 81003 URINALYSIS AUTO W/O SCOPE: CPT | Performed by: STUDENT IN AN ORGANIZED HEALTH CARE EDUCATION/TRAINING PROGRAM

## 2022-02-09 PROCEDURE — 82962 GLUCOSE BLOOD TEST: CPT

## 2022-02-09 PROCEDURE — 99232 SBSQ HOSP IP/OBS MODERATE 35: CPT | Performed by: OBSTETRICS & GYNECOLOGY

## 2022-02-09 RX ADMIN — Medication 1 TABLET: at 11:08

## 2022-02-10 ENCOUNTER — APPOINTMENT (OUTPATIENT)
Dept: ULTRASOUND IMAGING | Facility: HOSPITAL | Age: 37
End: 2022-02-10

## 2022-02-10 LAB
GLUCOSE BLDC GLUCOMTR-MCNC: 100 MG/DL (ref 70–130)
GLUCOSE BLDC GLUCOMTR-MCNC: 126 MG/DL (ref 70–130)
GLUCOSE BLDC GLUCOMTR-MCNC: 131 MG/DL (ref 70–130)
GLUCOSE BLDC GLUCOMTR-MCNC: 85 MG/DL (ref 70–130)

## 2022-02-10 PROCEDURE — 59025 FETAL NON-STRESS TEST: CPT | Performed by: STUDENT IN AN ORGANIZED HEALTH CARE EDUCATION/TRAINING PROGRAM

## 2022-02-10 PROCEDURE — 82962 GLUCOSE BLOOD TEST: CPT

## 2022-02-10 PROCEDURE — 76819 FETAL BIOPHYS PROFIL W/O NST: CPT

## 2022-02-10 PROCEDURE — 76820 UMBILICAL ARTERY ECHO: CPT

## 2022-02-10 PROCEDURE — 59025 FETAL NON-STRESS TEST: CPT

## 2022-02-10 PROCEDURE — 99232 SBSQ HOSP IP/OBS MODERATE 35: CPT | Performed by: OBSTETRICS & GYNECOLOGY

## 2022-02-10 RX ADMIN — Medication 1 TABLET: at 09:55

## 2022-02-11 LAB
GLUCOSE BLDC GLUCOMTR-MCNC: 121 MG/DL (ref 70–130)
GLUCOSE BLDC GLUCOMTR-MCNC: 85 MG/DL (ref 70–130)
GLUCOSE BLDC GLUCOMTR-MCNC: 92 MG/DL (ref 70–130)
GLUCOSE BLDC GLUCOMTR-MCNC: 96 MG/DL (ref 70–130)

## 2022-02-11 PROCEDURE — 59025 FETAL NON-STRESS TEST: CPT | Performed by: STUDENT IN AN ORGANIZED HEALTH CARE EDUCATION/TRAINING PROGRAM

## 2022-02-11 PROCEDURE — 59025 FETAL NON-STRESS TEST: CPT

## 2022-02-11 PROCEDURE — 82962 GLUCOSE BLOOD TEST: CPT

## 2022-02-11 PROCEDURE — 99232 SBSQ HOSP IP/OBS MODERATE 35: CPT | Performed by: OBSTETRICS & GYNECOLOGY

## 2022-02-11 PROCEDURE — 59025 FETAL NON-STRESS TEST: CPT | Performed by: OBSTETRICS & GYNECOLOGY

## 2022-02-12 LAB
GLUCOSE BLDC GLUCOMTR-MCNC: 102 MG/DL (ref 70–130)
GLUCOSE BLDC GLUCOMTR-MCNC: 115 MG/DL (ref 70–130)
GLUCOSE BLDC GLUCOMTR-MCNC: 144 MG/DL (ref 70–130)
GLUCOSE BLDC GLUCOMTR-MCNC: 89 MG/DL (ref 70–130)

## 2022-02-12 PROCEDURE — 82962 GLUCOSE BLOOD TEST: CPT

## 2022-02-12 PROCEDURE — 59025 FETAL NON-STRESS TEST: CPT

## 2022-02-12 PROCEDURE — 59025 FETAL NON-STRESS TEST: CPT | Performed by: STUDENT IN AN ORGANIZED HEALTH CARE EDUCATION/TRAINING PROGRAM

## 2022-02-12 PROCEDURE — 99232 SBSQ HOSP IP/OBS MODERATE 35: CPT | Performed by: STUDENT IN AN ORGANIZED HEALTH CARE EDUCATION/TRAINING PROGRAM

## 2022-02-12 RX ORDER — DIPHENHYDRAMINE HYDROCHLORIDE 25 MG/1
25 CAPSULE ORAL NIGHTLY PRN
Status: DISCONTINUED | OUTPATIENT
Start: 2022-02-12 | End: 2022-03-07

## 2022-02-12 RX ORDER — DIPHENHYDRAMINE HCL 25 MG
25 CAPSULE ORAL NIGHTLY PRN
Status: DISCONTINUED | OUTPATIENT
Start: 2022-02-12 | End: 2022-03-07

## 2022-02-12 RX ADMIN — Medication 1 TABLET: at 09:07

## 2022-02-13 LAB
GLUCOSE BLDC GLUCOMTR-MCNC: 102 MG/DL (ref 70–130)
GLUCOSE BLDC GLUCOMTR-MCNC: 109 MG/DL (ref 70–130)
GLUCOSE BLDC GLUCOMTR-MCNC: 109 MG/DL (ref 70–130)
GLUCOSE BLDC GLUCOMTR-MCNC: 122 MG/DL (ref 70–130)
GLUCOSE BLDC GLUCOMTR-MCNC: 92 MG/DL (ref 70–130)

## 2022-02-13 PROCEDURE — 99232 SBSQ HOSP IP/OBS MODERATE 35: CPT | Performed by: STUDENT IN AN ORGANIZED HEALTH CARE EDUCATION/TRAINING PROGRAM

## 2022-02-13 PROCEDURE — 59025 FETAL NON-STRESS TEST: CPT

## 2022-02-13 PROCEDURE — 59025 FETAL NON-STRESS TEST: CPT | Performed by: STUDENT IN AN ORGANIZED HEALTH CARE EDUCATION/TRAINING PROGRAM

## 2022-02-13 PROCEDURE — 82962 GLUCOSE BLOOD TEST: CPT

## 2022-02-13 RX ADMIN — Medication 1 TABLET: at 09:04

## 2022-02-14 ENCOUNTER — APPOINTMENT (OUTPATIENT)
Dept: ULTRASOUND IMAGING | Facility: HOSPITAL | Age: 37
End: 2022-02-14

## 2022-02-14 LAB
GLUCOSE BLDC GLUCOMTR-MCNC: 103 MG/DL (ref 70–130)
GLUCOSE BLDC GLUCOMTR-MCNC: 103 MG/DL (ref 70–130)
GLUCOSE BLDC GLUCOMTR-MCNC: 108 MG/DL (ref 70–130)
GLUCOSE BLDC GLUCOMTR-MCNC: 109 MG/DL (ref 70–130)
GLUCOSE BLDC GLUCOMTR-MCNC: 119 MG/DL (ref 70–130)

## 2022-02-14 PROCEDURE — 59025 FETAL NON-STRESS TEST: CPT | Performed by: OBSTETRICS & GYNECOLOGY

## 2022-02-14 PROCEDURE — 76819 FETAL BIOPHYS PROFIL W/O NST: CPT

## 2022-02-14 PROCEDURE — 99232 SBSQ HOSP IP/OBS MODERATE 35: CPT | Performed by: OBSTETRICS & GYNECOLOGY

## 2022-02-14 PROCEDURE — 59025 FETAL NON-STRESS TEST: CPT | Performed by: STUDENT IN AN ORGANIZED HEALTH CARE EDUCATION/TRAINING PROGRAM

## 2022-02-14 PROCEDURE — 82962 GLUCOSE BLOOD TEST: CPT

## 2022-02-14 PROCEDURE — 76820 UMBILICAL ARTERY ECHO: CPT

## 2022-02-15 LAB
GLUCOSE BLDC GLUCOMTR-MCNC: 113 MG/DL (ref 70–130)
GLUCOSE BLDC GLUCOMTR-MCNC: 119 MG/DL (ref 70–130)
GLUCOSE BLDC GLUCOMTR-MCNC: 87 MG/DL (ref 70–130)
GLUCOSE BLDC GLUCOMTR-MCNC: 93 MG/DL (ref 70–130)
GLUCOSE BLDC GLUCOMTR-MCNC: 99 MG/DL (ref 70–130)

## 2022-02-15 PROCEDURE — 59025 FETAL NON-STRESS TEST: CPT | Performed by: STUDENT IN AN ORGANIZED HEALTH CARE EDUCATION/TRAINING PROGRAM

## 2022-02-15 PROCEDURE — 99232 SBSQ HOSP IP/OBS MODERATE 35: CPT | Performed by: OBSTETRICS & GYNECOLOGY

## 2022-02-15 PROCEDURE — 82962 GLUCOSE BLOOD TEST: CPT

## 2022-02-15 RX ADMIN — Medication 1 TABLET: at 09:34

## 2022-02-16 ENCOUNTER — APPOINTMENT (OUTPATIENT)
Dept: ULTRASOUND IMAGING | Facility: HOSPITAL | Age: 37
End: 2022-02-16

## 2022-02-16 LAB
GLUCOSE BLDC GLUCOMTR-MCNC: 102 MG/DL (ref 70–130)
GLUCOSE BLDC GLUCOMTR-MCNC: 146 MG/DL (ref 70–130)
GLUCOSE BLDC GLUCOMTR-MCNC: 94 MG/DL (ref 70–130)
GLUCOSE BLDC GLUCOMTR-MCNC: 99 MG/DL (ref 70–130)

## 2022-02-16 PROCEDURE — 76819 FETAL BIOPHYS PROFIL W/O NST: CPT

## 2022-02-16 PROCEDURE — 59025 FETAL NON-STRESS TEST: CPT | Performed by: STUDENT IN AN ORGANIZED HEALTH CARE EDUCATION/TRAINING PROGRAM

## 2022-02-16 PROCEDURE — 59025 FETAL NON-STRESS TEST: CPT

## 2022-02-16 PROCEDURE — 76820 UMBILICAL ARTERY ECHO: CPT

## 2022-02-16 PROCEDURE — 99232 SBSQ HOSP IP/OBS MODERATE 35: CPT | Performed by: OBSTETRICS & GYNECOLOGY

## 2022-02-16 PROCEDURE — 82962 GLUCOSE BLOOD TEST: CPT

## 2022-02-16 PROCEDURE — 59025 FETAL NON-STRESS TEST: CPT | Performed by: OBSTETRICS & GYNECOLOGY

## 2022-02-16 RX ADMIN — Medication 1 TABLET: at 10:40

## 2022-02-17 LAB
ALBUMIN SERPL-MCNC: 3 G/DL (ref 3.5–5.2)
ALBUMIN/GLOB SERPL: 1.3 G/DL
ALP SERPL-CCNC: 113 U/L (ref 39–117)
ALT SERPL W P-5'-P-CCNC: 9 U/L (ref 1–33)
ANION GAP SERPL CALCULATED.3IONS-SCNC: 8 MMOL/L (ref 5–15)
AST SERPL-CCNC: 13 U/L (ref 1–32)
BASOPHILS # BLD AUTO: 0.02 10*3/MM3 (ref 0–0.2)
BASOPHILS NFR BLD AUTO: 0.2 % (ref 0–1.5)
BILIRUB SERPL-MCNC: <0.2 MG/DL (ref 0–1.2)
BUN SERPL-MCNC: 9 MG/DL (ref 6–20)
BUN/CREAT SERPL: 18 (ref 7–25)
CALCIUM SPEC-SCNC: 8.2 MG/DL (ref 8.6–10.5)
CHLORIDE SERPL-SCNC: 109 MMOL/L (ref 98–107)
CHOLEST SERPL-MCNC: 226 MG/DL (ref 0–200)
CO2 SERPL-SCNC: 21 MMOL/L (ref 22–29)
CREAT SERPL-MCNC: 0.5 MG/DL (ref 0.57–1)
DEPRECATED RDW RBC AUTO: 44.3 FL (ref 37–54)
EOSINOPHIL # BLD AUTO: 0.12 10*3/MM3 (ref 0–0.4)
EOSINOPHIL NFR BLD AUTO: 1.2 % (ref 0.3–6.2)
ERYTHROCYTE [DISTWIDTH] IN BLOOD BY AUTOMATED COUNT: 15.5 % (ref 12.3–15.4)
GFR SERPL CREATININE-BSD FRML MDRD: 140 ML/MIN/1.73
GLOBULIN UR ELPH-MCNC: 2.3 GM/DL
GLUCOSE BLDC GLUCOMTR-MCNC: 117 MG/DL (ref 70–130)
GLUCOSE BLDC GLUCOMTR-MCNC: 132 MG/DL (ref 70–130)
GLUCOSE BLDC GLUCOMTR-MCNC: 89 MG/DL (ref 70–130)
GLUCOSE BLDC GLUCOMTR-MCNC: 93 MG/DL (ref 70–130)
GLUCOSE SERPL-MCNC: 95 MG/DL (ref 65–99)
HCT VFR BLD AUTO: 33.3 % (ref 34–46.6)
HDLC SERPL-MCNC: 61 MG/DL (ref 40–60)
HGB BLD-MCNC: 10.7 G/DL (ref 12–15.9)
IMM GRANULOCYTES # BLD AUTO: 0.06 10*3/MM3 (ref 0–0.05)
IMM GRANULOCYTES NFR BLD AUTO: 0.6 % (ref 0–0.5)
LDLC SERPL CALC-MCNC: 134 MG/DL (ref 0–100)
LDLC/HDLC SERPL: 2.13 {RATIO}
LYMPHOCYTES # BLD AUTO: 1.7 10*3/MM3 (ref 0.7–3.1)
LYMPHOCYTES NFR BLD AUTO: 17.1 % (ref 19.6–45.3)
MCH RBC QN AUTO: 26.1 PG (ref 26.6–33)
MCHC RBC AUTO-ENTMCNC: 32.1 G/DL (ref 31.5–35.7)
MCV RBC AUTO: 81.2 FL (ref 79–97)
MONOCYTES # BLD AUTO: 0.73 10*3/MM3 (ref 0.1–0.9)
MONOCYTES NFR BLD AUTO: 7.4 % (ref 5–12)
NEUTROPHILS NFR BLD AUTO: 7.29 10*3/MM3 (ref 1.7–7)
NEUTROPHILS NFR BLD AUTO: 73.5 % (ref 42.7–76)
NRBC BLD AUTO-RTO: 0 /100 WBC (ref 0–0.2)
PLATELET # BLD AUTO: 191 10*3/MM3 (ref 140–450)
PMV BLD AUTO: 10.1 FL (ref 6–12)
POTASSIUM SERPL-SCNC: 3.8 MMOL/L (ref 3.5–5.2)
PROT SERPL-MCNC: 5.3 G/DL (ref 6–8.5)
RBC # BLD AUTO: 4.1 10*6/MM3 (ref 3.77–5.28)
SODIUM SERPL-SCNC: 138 MMOL/L (ref 136–145)
TRIGL SERPL-MCNC: 175 MG/DL (ref 0–150)
VLDLC SERPL-MCNC: 31 MG/DL (ref 5–40)
WBC NRBC COR # BLD: 9.92 10*3/MM3 (ref 3.4–10.8)

## 2022-02-17 PROCEDURE — 59025 FETAL NON-STRESS TEST: CPT | Performed by: OBSTETRICS & GYNECOLOGY

## 2022-02-17 PROCEDURE — 82962 GLUCOSE BLOOD TEST: CPT

## 2022-02-17 PROCEDURE — 59025 FETAL NON-STRESS TEST: CPT | Performed by: STUDENT IN AN ORGANIZED HEALTH CARE EDUCATION/TRAINING PROGRAM

## 2022-02-17 PROCEDURE — 80061 LIPID PANEL: CPT | Performed by: OBSTETRICS & GYNECOLOGY

## 2022-02-17 PROCEDURE — 80053 COMPREHEN METABOLIC PANEL: CPT | Performed by: OBSTETRICS & GYNECOLOGY

## 2022-02-17 PROCEDURE — 85025 COMPLETE CBC W/AUTO DIFF WBC: CPT | Performed by: OBSTETRICS & GYNECOLOGY

## 2022-02-17 PROCEDURE — 99232 SBSQ HOSP IP/OBS MODERATE 35: CPT | Performed by: OBSTETRICS & GYNECOLOGY

## 2022-02-17 RX ADMIN — Medication 1 TABLET: at 10:39

## 2022-02-18 LAB
ABO GROUP BLD: NORMAL
ANTI-D, PASSIVE: NORMAL
BLD GP AB SCN SERPL QL: POSITIVE
DEPRECATED RDW RBC AUTO: 45.4 FL (ref 37–54)
ERYTHROCYTE [DISTWIDTH] IN BLOOD BY AUTOMATED COUNT: 15.6 % (ref 12.3–15.4)
FERRITIN SERPL-MCNC: 9.3 NG/ML (ref 13–150)
GLUCOSE BLDC GLUCOMTR-MCNC: 108 MG/DL (ref 70–130)
GLUCOSE BLDC GLUCOMTR-MCNC: 121 MG/DL (ref 70–130)
GLUCOSE BLDC GLUCOMTR-MCNC: 87 MG/DL (ref 70–130)
GLUCOSE BLDC GLUCOMTR-MCNC: 96 MG/DL (ref 70–130)
HCT VFR BLD AUTO: 33.7 % (ref 34–46.6)
HGB BLD-MCNC: 11 G/DL (ref 12–15.9)
IRON 24H UR-MRATE: 40 MCG/DL (ref 37–145)
IRON SATN MFR SERPL: 6 % (ref 20–50)
Lab: NORMAL
MCH RBC QN AUTO: 26.5 PG (ref 26.6–33)
MCHC RBC AUTO-ENTMCNC: 32.6 G/DL (ref 31.5–35.7)
MCV RBC AUTO: 81.2 FL (ref 79–97)
PLATELET # BLD AUTO: 193 10*3/MM3 (ref 140–450)
PMV BLD AUTO: 10.3 FL (ref 6–12)
RBC # BLD AUTO: 4.15 10*6/MM3 (ref 3.77–5.28)
RH BLD: NEGATIVE
T&S EXPIRATION DATE: NORMAL
TIBC SERPL-MCNC: 617 MCG/DL (ref 298–536)
TRANSFERRIN SERPL-MCNC: 414 MG/DL (ref 200–360)
WBC NRBC COR # BLD: 8.69 10*3/MM3 (ref 3.4–10.8)

## 2022-02-18 PROCEDURE — 85027 COMPLETE CBC AUTOMATED: CPT | Performed by: OBSTETRICS & GYNECOLOGY

## 2022-02-18 PROCEDURE — 83540 ASSAY OF IRON: CPT | Performed by: OBSTETRICS & GYNECOLOGY

## 2022-02-18 PROCEDURE — 86901 BLOOD TYPING SEROLOGIC RH(D): CPT | Performed by: OBSTETRICS & GYNECOLOGY

## 2022-02-18 PROCEDURE — 82728 ASSAY OF FERRITIN: CPT | Performed by: OBSTETRICS & GYNECOLOGY

## 2022-02-18 PROCEDURE — 99232 SBSQ HOSP IP/OBS MODERATE 35: CPT | Performed by: OBSTETRICS & GYNECOLOGY

## 2022-02-18 PROCEDURE — 82962 GLUCOSE BLOOD TEST: CPT

## 2022-02-18 PROCEDURE — 59025 FETAL NON-STRESS TEST: CPT | Performed by: STUDENT IN AN ORGANIZED HEALTH CARE EDUCATION/TRAINING PROGRAM

## 2022-02-18 PROCEDURE — 59025 FETAL NON-STRESS TEST: CPT | Performed by: OBSTETRICS & GYNECOLOGY

## 2022-02-18 PROCEDURE — 59025 FETAL NON-STRESS TEST: CPT

## 2022-02-18 PROCEDURE — 84466 ASSAY OF TRANSFERRIN: CPT | Performed by: OBSTETRICS & GYNECOLOGY

## 2022-02-18 PROCEDURE — 86850 RBC ANTIBODY SCREEN: CPT | Performed by: OBSTETRICS & GYNECOLOGY

## 2022-02-18 PROCEDURE — 86900 BLOOD TYPING SEROLOGIC ABO: CPT | Performed by: OBSTETRICS & GYNECOLOGY

## 2022-02-18 PROCEDURE — 86870 RBC ANTIBODY IDENTIFICATION: CPT | Performed by: OBSTETRICS & GYNECOLOGY

## 2022-02-18 RX ADMIN — Medication 1 TABLET: at 09:38

## 2022-02-19 LAB
GLUCOSE BLDC GLUCOMTR-MCNC: 101 MG/DL (ref 70–130)
GLUCOSE BLDC GLUCOMTR-MCNC: 106 MG/DL (ref 70–130)
GLUCOSE BLDC GLUCOMTR-MCNC: 122 MG/DL (ref 70–130)
GLUCOSE BLDC GLUCOMTR-MCNC: 91 MG/DL (ref 70–130)

## 2022-02-19 PROCEDURE — 59025 FETAL NON-STRESS TEST: CPT

## 2022-02-19 PROCEDURE — 99232 SBSQ HOSP IP/OBS MODERATE 35: CPT | Performed by: OBSTETRICS & GYNECOLOGY

## 2022-02-19 PROCEDURE — 59025 FETAL NON-STRESS TEST: CPT | Performed by: OBSTETRICS & GYNECOLOGY

## 2022-02-19 PROCEDURE — 82962 GLUCOSE BLOOD TEST: CPT

## 2022-02-19 RX ORDER — HYDROXYZINE PAMOATE 25 MG/1
25 CAPSULE ORAL EVERY 8 HOURS PRN
Status: DISCONTINUED | OUTPATIENT
Start: 2022-02-19 | End: 2022-03-07

## 2022-02-19 RX ADMIN — Medication 1 TABLET: at 09:28

## 2022-02-20 LAB
GLUCOSE BLDC GLUCOMTR-MCNC: 115 MG/DL (ref 70–130)
GLUCOSE BLDC GLUCOMTR-MCNC: 88 MG/DL (ref 70–130)
GLUCOSE BLDC GLUCOMTR-MCNC: 96 MG/DL (ref 70–130)

## 2022-02-20 PROCEDURE — 82962 GLUCOSE BLOOD TEST: CPT

## 2022-02-20 PROCEDURE — 59025 FETAL NON-STRESS TEST: CPT | Performed by: OBSTETRICS & GYNECOLOGY

## 2022-02-20 PROCEDURE — 99232 SBSQ HOSP IP/OBS MODERATE 35: CPT | Performed by: OBSTETRICS & GYNECOLOGY

## 2022-02-20 PROCEDURE — 59025 FETAL NON-STRESS TEST: CPT | Performed by: STUDENT IN AN ORGANIZED HEALTH CARE EDUCATION/TRAINING PROGRAM

## 2022-02-20 PROCEDURE — 59025 FETAL NON-STRESS TEST: CPT

## 2022-02-20 RX ORDER — DOCUSATE SODIUM 100 MG/1
100 CAPSULE, LIQUID FILLED ORAL 2 TIMES DAILY
Status: DISCONTINUED | OUTPATIENT
Start: 2022-02-20 | End: 2022-03-07

## 2022-02-20 RX ORDER — FERROUS SULFATE TAB EC 324 MG (65 MG FE EQUIVALENT) 324 (65 FE) MG
324 TABLET DELAYED RESPONSE ORAL
Status: DISCONTINUED | OUTPATIENT
Start: 2022-02-20 | End: 2022-03-07

## 2022-02-20 RX ADMIN — FERROUS SULFATE TAB EC 324 MG (65 MG FE EQUIVALENT) 324 MG: 324 (65 FE) TABLET DELAYED RESPONSE at 13:02

## 2022-02-20 RX ADMIN — Medication 1 TABLET: at 13:03

## 2022-02-20 RX ADMIN — HYDROXYZINE PAMOATE 25 MG: 25 CAPSULE ORAL at 23:45

## 2022-02-20 RX ADMIN — DOCUSATE SODIUM 100 MG: 100 CAPSULE, LIQUID FILLED ORAL at 13:02

## 2022-02-21 ENCOUNTER — APPOINTMENT (OUTPATIENT)
Dept: ULTRASOUND IMAGING | Facility: HOSPITAL | Age: 37
End: 2022-02-21

## 2022-02-21 LAB
GLUCOSE BLDC GLUCOMTR-MCNC: 102 MG/DL (ref 70–130)
GLUCOSE BLDC GLUCOMTR-MCNC: 115 MG/DL (ref 70–130)
GLUCOSE BLDC GLUCOMTR-MCNC: 145 MG/DL (ref 70–130)

## 2022-02-21 PROCEDURE — 82962 GLUCOSE BLOOD TEST: CPT

## 2022-02-21 PROCEDURE — 59025 FETAL NON-STRESS TEST: CPT | Performed by: OBSTETRICS & GYNECOLOGY

## 2022-02-21 PROCEDURE — 76819 FETAL BIOPHYS PROFIL W/O NST: CPT

## 2022-02-21 PROCEDURE — 59025 FETAL NON-STRESS TEST: CPT | Performed by: STUDENT IN AN ORGANIZED HEALTH CARE EDUCATION/TRAINING PROGRAM

## 2022-02-21 PROCEDURE — 76820 UMBILICAL ARTERY ECHO: CPT

## 2022-02-21 PROCEDURE — 99232 SBSQ HOSP IP/OBS MODERATE 35: CPT | Performed by: OBSTETRICS & GYNECOLOGY

## 2022-02-21 RX ADMIN — DOCUSATE SODIUM 100 MG: 100 CAPSULE, LIQUID FILLED ORAL at 08:54

## 2022-02-21 RX ADMIN — Medication 1 TABLET: at 08:54

## 2022-02-21 RX ADMIN — HYDROXYZINE PAMOATE 25 MG: 25 CAPSULE ORAL at 23:03

## 2022-02-21 RX ADMIN — DOCUSATE SODIUM 100 MG: 100 CAPSULE, LIQUID FILLED ORAL at 23:03

## 2022-02-21 RX ADMIN — FERROUS SULFATE TAB EC 324 MG (65 MG FE EQUIVALENT) 324 MG: 324 (65 FE) TABLET DELAYED RESPONSE at 08:54

## 2022-02-22 LAB
GLUCOSE BLDC GLUCOMTR-MCNC: 102 MG/DL (ref 70–130)
GLUCOSE BLDC GLUCOMTR-MCNC: 112 MG/DL (ref 70–130)
GLUCOSE BLDC GLUCOMTR-MCNC: 123 MG/DL (ref 70–130)
GLUCOSE BLDC GLUCOMTR-MCNC: 141 MG/DL (ref 70–130)
GLUCOSE BLDC GLUCOMTR-MCNC: 92 MG/DL (ref 70–130)

## 2022-02-22 PROCEDURE — 99232 SBSQ HOSP IP/OBS MODERATE 35: CPT | Performed by: OBSTETRICS & GYNECOLOGY

## 2022-02-22 PROCEDURE — 59025 FETAL NON-STRESS TEST: CPT

## 2022-02-22 PROCEDURE — 59025 FETAL NON-STRESS TEST: CPT | Performed by: OBSTETRICS & GYNECOLOGY

## 2022-02-22 PROCEDURE — 82962 GLUCOSE BLOOD TEST: CPT

## 2022-02-22 RX ADMIN — Medication 1 TABLET: at 09:49

## 2022-02-22 RX ADMIN — DOCUSATE SODIUM 100 MG: 100 CAPSULE, LIQUID FILLED ORAL at 09:49

## 2022-02-22 RX ADMIN — FERROUS SULFATE TAB EC 324 MG (65 MG FE EQUIVALENT) 324 MG: 324 (65 FE) TABLET DELAYED RESPONSE at 09:49

## 2022-02-22 RX ADMIN — DOCUSATE SODIUM 100 MG: 100 CAPSULE, LIQUID FILLED ORAL at 21:03

## 2022-02-23 ENCOUNTER — APPOINTMENT (OUTPATIENT)
Dept: ULTRASOUND IMAGING | Facility: HOSPITAL | Age: 37
End: 2022-02-23

## 2022-02-23 LAB
GLUCOSE BLDC GLUCOMTR-MCNC: 106 MG/DL (ref 70–130)
GLUCOSE BLDC GLUCOMTR-MCNC: 111 MG/DL (ref 70–130)
GLUCOSE BLDC GLUCOMTR-MCNC: 88 MG/DL (ref 70–130)
GLUCOSE BLDC GLUCOMTR-MCNC: 95 MG/DL (ref 70–130)

## 2022-02-23 PROCEDURE — 59025 FETAL NON-STRESS TEST: CPT | Performed by: OBSTETRICS & GYNECOLOGY

## 2022-02-23 PROCEDURE — 59025 FETAL NON-STRESS TEST: CPT | Performed by: STUDENT IN AN ORGANIZED HEALTH CARE EDUCATION/TRAINING PROGRAM

## 2022-02-23 PROCEDURE — 76819 FETAL BIOPHYS PROFIL W/O NST: CPT

## 2022-02-23 PROCEDURE — 82962 GLUCOSE BLOOD TEST: CPT

## 2022-02-23 PROCEDURE — 76820 UMBILICAL ARTERY ECHO: CPT

## 2022-02-23 PROCEDURE — 99232 SBSQ HOSP IP/OBS MODERATE 35: CPT | Performed by: OBSTETRICS & GYNECOLOGY

## 2022-02-23 RX ADMIN — DOCUSATE SODIUM 100 MG: 100 CAPSULE, LIQUID FILLED ORAL at 21:32

## 2022-02-23 RX ADMIN — Medication 1 TABLET: at 11:44

## 2022-02-23 RX ADMIN — DOCUSATE SODIUM 100 MG: 100 CAPSULE, LIQUID FILLED ORAL at 11:43

## 2022-02-23 RX ADMIN — FERROUS SULFATE TAB EC 324 MG (65 MG FE EQUIVALENT) 324 MG: 324 (65 FE) TABLET DELAYED RESPONSE at 11:44

## 2022-02-24 LAB
GLUCOSE BLDC GLUCOMTR-MCNC: 106 MG/DL (ref 70–130)
GLUCOSE BLDC GLUCOMTR-MCNC: 113 MG/DL (ref 70–130)
GLUCOSE BLDC GLUCOMTR-MCNC: 212 MG/DL (ref 70–130)
GLUCOSE BLDC GLUCOMTR-MCNC: 68 MG/DL (ref 70–130)
GLUCOSE BLDC GLUCOMTR-MCNC: 96 MG/DL (ref 70–130)

## 2022-02-24 PROCEDURE — 82962 GLUCOSE BLOOD TEST: CPT

## 2022-02-24 PROCEDURE — 59025 FETAL NON-STRESS TEST: CPT | Performed by: OBSTETRICS & GYNECOLOGY

## 2022-02-24 PROCEDURE — 99232 SBSQ HOSP IP/OBS MODERATE 35: CPT | Performed by: OBSTETRICS & GYNECOLOGY

## 2022-02-24 RX ADMIN — DOCUSATE SODIUM 100 MG: 100 CAPSULE, LIQUID FILLED ORAL at 07:56

## 2022-02-24 RX ADMIN — Medication 1 TABLET: at 07:56

## 2022-02-24 RX ADMIN — FERROUS SULFATE TAB EC 324 MG (65 MG FE EQUIVALENT) 324 MG: 324 (65 FE) TABLET DELAYED RESPONSE at 07:56

## 2022-02-24 RX ADMIN — DOCUSATE SODIUM 100 MG: 100 CAPSULE, LIQUID FILLED ORAL at 21:36

## 2022-02-25 LAB
GLUCOSE BLDC GLUCOMTR-MCNC: 103 MG/DL (ref 70–130)
GLUCOSE BLDC GLUCOMTR-MCNC: 109 MG/DL (ref 70–130)
GLUCOSE BLDC GLUCOMTR-MCNC: 92 MG/DL (ref 70–130)
GLUCOSE BLDC GLUCOMTR-MCNC: 94 MG/DL (ref 70–130)

## 2022-02-25 PROCEDURE — 82962 GLUCOSE BLOOD TEST: CPT

## 2022-02-25 PROCEDURE — 59025 FETAL NON-STRESS TEST: CPT

## 2022-02-25 PROCEDURE — 99232 SBSQ HOSP IP/OBS MODERATE 35: CPT | Performed by: OBSTETRICS & GYNECOLOGY

## 2022-02-25 PROCEDURE — 59025 FETAL NON-STRESS TEST: CPT | Performed by: OBSTETRICS & GYNECOLOGY

## 2022-02-25 RX ADMIN — DOCUSATE SODIUM 100 MG: 100 CAPSULE, LIQUID FILLED ORAL at 12:07

## 2022-02-25 RX ADMIN — FERROUS SULFATE TAB EC 324 MG (65 MG FE EQUIVALENT) 324 MG: 324 (65 FE) TABLET DELAYED RESPONSE at 12:07

## 2022-02-25 RX ADMIN — DOCUSATE SODIUM 100 MG: 100 CAPSULE, LIQUID FILLED ORAL at 20:20

## 2022-02-25 RX ADMIN — Medication 1 TABLET: at 12:07

## 2022-02-26 LAB
GLUCOSE BLDC GLUCOMTR-MCNC: 118 MG/DL (ref 70–130)
GLUCOSE BLDC GLUCOMTR-MCNC: 139 MG/DL (ref 70–130)
GLUCOSE BLDC GLUCOMTR-MCNC: 87 MG/DL (ref 70–130)
GLUCOSE BLDC GLUCOMTR-MCNC: 89 MG/DL (ref 70–130)

## 2022-02-26 PROCEDURE — 99232 SBSQ HOSP IP/OBS MODERATE 35: CPT | Performed by: OBSTETRICS & GYNECOLOGY

## 2022-02-26 PROCEDURE — 59025 FETAL NON-STRESS TEST: CPT | Performed by: OBSTETRICS & GYNECOLOGY

## 2022-02-26 PROCEDURE — 82962 GLUCOSE BLOOD TEST: CPT

## 2022-02-26 RX ADMIN — Medication 1 TABLET: at 09:54

## 2022-02-26 RX ADMIN — DOCUSATE SODIUM 100 MG: 100 CAPSULE, LIQUID FILLED ORAL at 20:31

## 2022-02-26 RX ADMIN — FERROUS SULFATE TAB EC 324 MG (65 MG FE EQUIVALENT) 324 MG: 324 (65 FE) TABLET DELAYED RESPONSE at 09:54

## 2022-02-27 LAB
GLUCOSE BLDC GLUCOMTR-MCNC: 105 MG/DL (ref 70–130)
GLUCOSE BLDC GLUCOMTR-MCNC: 125 MG/DL (ref 70–130)
GLUCOSE BLDC GLUCOMTR-MCNC: 82 MG/DL (ref 70–130)
GLUCOSE BLDC GLUCOMTR-MCNC: 94 MG/DL (ref 70–130)

## 2022-02-27 PROCEDURE — 99232 SBSQ HOSP IP/OBS MODERATE 35: CPT | Performed by: OBSTETRICS & GYNECOLOGY

## 2022-02-27 PROCEDURE — 59025 FETAL NON-STRESS TEST: CPT | Performed by: OBSTETRICS & GYNECOLOGY

## 2022-02-27 PROCEDURE — 59025 FETAL NON-STRESS TEST: CPT

## 2022-02-27 PROCEDURE — 82962 GLUCOSE BLOOD TEST: CPT

## 2022-02-27 RX ADMIN — DOCUSATE SODIUM 100 MG: 100 CAPSULE, LIQUID FILLED ORAL at 10:54

## 2022-02-27 RX ADMIN — Medication 1 TABLET: at 10:54

## 2022-02-27 RX ADMIN — FERROUS SULFATE TAB EC 324 MG (65 MG FE EQUIVALENT) 324 MG: 324 (65 FE) TABLET DELAYED RESPONSE at 10:54

## 2022-02-28 ENCOUNTER — APPOINTMENT (OUTPATIENT)
Dept: ULTRASOUND IMAGING | Facility: HOSPITAL | Age: 37
End: 2022-02-28

## 2022-02-28 LAB
GLUCOSE BLDC GLUCOMTR-MCNC: 100 MG/DL (ref 70–130)
GLUCOSE BLDC GLUCOMTR-MCNC: 102 MG/DL (ref 70–130)
GLUCOSE BLDC GLUCOMTR-MCNC: 133 MG/DL (ref 70–130)
GLUCOSE BLDC GLUCOMTR-MCNC: 88 MG/DL (ref 70–130)

## 2022-02-28 PROCEDURE — 59025 FETAL NON-STRESS TEST: CPT

## 2022-02-28 PROCEDURE — 99232 SBSQ HOSP IP/OBS MODERATE 35: CPT | Performed by: OBSTETRICS & GYNECOLOGY

## 2022-02-28 PROCEDURE — 76816 OB US FOLLOW-UP PER FETUS: CPT

## 2022-02-28 PROCEDURE — 76820 UMBILICAL ARTERY ECHO: CPT

## 2022-02-28 PROCEDURE — 59025 FETAL NON-STRESS TEST: CPT | Performed by: OBSTETRICS & GYNECOLOGY

## 2022-02-28 PROCEDURE — 82962 GLUCOSE BLOOD TEST: CPT

## 2022-02-28 PROCEDURE — 76819 FETAL BIOPHYS PROFIL W/O NST: CPT

## 2022-02-28 RX ADMIN — FERROUS SULFATE TAB EC 324 MG (65 MG FE EQUIVALENT) 324 MG: 324 (65 FE) TABLET DELAYED RESPONSE at 07:55

## 2022-02-28 RX ADMIN — Medication 1 TABLET: at 07:55

## 2022-02-28 RX ADMIN — DOCUSATE SODIUM 100 MG: 100 CAPSULE, LIQUID FILLED ORAL at 07:55

## 2022-03-01 LAB
DEPRECATED RDW RBC AUTO: 45.9 FL (ref 37–54)
ERYTHROCYTE [DISTWIDTH] IN BLOOD BY AUTOMATED COUNT: 15.8 % (ref 12.3–15.4)
FERRITIN SERPL-MCNC: 34.37 NG/ML (ref 13–150)
GLUCOSE BLDC GLUCOMTR-MCNC: 107 MG/DL (ref 70–130)
GLUCOSE BLDC GLUCOMTR-MCNC: 121 MG/DL (ref 70–130)
GLUCOSE BLDC GLUCOMTR-MCNC: 130 MG/DL (ref 70–130)
GLUCOSE BLDC GLUCOMTR-MCNC: 89 MG/DL (ref 70–130)
HCT VFR BLD AUTO: 33.5 % (ref 34–46.6)
HGB BLD-MCNC: 10.9 G/DL (ref 12–15.9)
IRON 24H UR-MRATE: 96 MCG/DL (ref 37–145)
IRON SATN MFR SERPL: 18 % (ref 20–50)
MCH RBC QN AUTO: 26.7 PG (ref 26.6–33)
MCHC RBC AUTO-ENTMCNC: 32.5 G/DL (ref 31.5–35.7)
MCV RBC AUTO: 81.9 FL (ref 79–97)
PLATELET # BLD AUTO: 180 10*3/MM3 (ref 140–450)
PMV BLD AUTO: 10.5 FL (ref 6–12)
RBC # BLD AUTO: 4.09 10*6/MM3 (ref 3.77–5.28)
TIBC SERPL-MCNC: 533 MCG/DL (ref 298–536)
TRANSFERRIN SERPL-MCNC: 358 MG/DL (ref 200–360)
WBC NRBC COR # BLD: 8.42 10*3/MM3 (ref 3.4–10.8)

## 2022-03-01 PROCEDURE — 59025 FETAL NON-STRESS TEST: CPT

## 2022-03-01 PROCEDURE — 86696 HERPES SIMPLEX TYPE 2 TEST: CPT | Performed by: OBSTETRICS & GYNECOLOGY

## 2022-03-01 PROCEDURE — 86644 CMV ANTIBODY: CPT | Performed by: OBSTETRICS & GYNECOLOGY

## 2022-03-01 PROCEDURE — 86694 HERPES SIMPLEX NES ANTBDY: CPT | Performed by: OBSTETRICS & GYNECOLOGY

## 2022-03-01 PROCEDURE — 82962 GLUCOSE BLOOD TEST: CPT

## 2022-03-01 PROCEDURE — 99232 SBSQ HOSP IP/OBS MODERATE 35: CPT | Performed by: OBSTETRICS & GYNECOLOGY

## 2022-03-01 PROCEDURE — 84466 ASSAY OF TRANSFERRIN: CPT | Performed by: OBSTETRICS & GYNECOLOGY

## 2022-03-01 PROCEDURE — 59025 FETAL NON-STRESS TEST: CPT | Performed by: STUDENT IN AN ORGANIZED HEALTH CARE EDUCATION/TRAINING PROGRAM

## 2022-03-01 PROCEDURE — 82728 ASSAY OF FERRITIN: CPT | Performed by: OBSTETRICS & GYNECOLOGY

## 2022-03-01 PROCEDURE — 86777 TOXOPLASMA ANTIBODY: CPT | Performed by: OBSTETRICS & GYNECOLOGY

## 2022-03-01 PROCEDURE — 86778 TOXOPLASMA ANTIBODY IGM: CPT | Performed by: OBSTETRICS & GYNECOLOGY

## 2022-03-01 PROCEDURE — 86645 CMV ANTIBODY IGM: CPT | Performed by: OBSTETRICS & GYNECOLOGY

## 2022-03-01 PROCEDURE — 85027 COMPLETE CBC AUTOMATED: CPT | Performed by: OBSTETRICS & GYNECOLOGY

## 2022-03-01 PROCEDURE — 83540 ASSAY OF IRON: CPT | Performed by: OBSTETRICS & GYNECOLOGY

## 2022-03-01 PROCEDURE — 86695 HERPES SIMPLEX TYPE 1 TEST: CPT | Performed by: OBSTETRICS & GYNECOLOGY

## 2022-03-01 RX ADMIN — FERROUS SULFATE TAB EC 324 MG (65 MG FE EQUIVALENT) 324 MG: 324 (65 FE) TABLET DELAYED RESPONSE at 11:51

## 2022-03-01 RX ADMIN — Medication 1 TABLET: at 11:51

## 2022-03-01 NOTE — PROGRESS NOTES
Nutrition Services    Patient Name:  Zahra Gasca  YOB: 1985  MRN: [id number]  Admit Date:  1/22/2022    Spoke with nursing re pt's nutrition. Pt is eating well and is  choosing what she wants to eat per nursing. There are a few intakes recorded for the patient-%. Pt's glucose levels are within normal limits for pregnancy and nursing agrees she is doing well in managing her blood sugar.RDN staff is available as needed during her stay. Date of c section is Monday, March 7,2022 per RN.     Electronically signed by:  Khloe Ramsay RD  03/01/22 12:09 CST

## 2022-03-01 NOTE — NON STRESS TEST
Zahra Gasca, a  at 35w2d with an ALEXEY of 2022, by Last Menstrual Period, was seen at [facility] for a nonstress test.    Chief Complaint   Patient presents with   • Vaginal Bleeding     1215   • other     reports fetal movement       Patient Active Problem List   Diagnosis   • Anxiety state   • Multigravida of advanced maternal age in second trimester   • Anxiety during pregnancy   • Nausea and vomiting during pregnancy prior to 22 weeks gestation   • History of severe pre-eclampsia   • Previous  delivery affecting pregnancy   • Supervision of high risk pregnancy in second trimester   • Placenta previa in second trimester   • Nephrolithiasis   • Placenta previa antepartum       Start Time:   Stop Time:     Interpretation A  Nonstress Test Interpretation A: Reactive (22 2013 : Melisa Mchugh, RN)  Comments A: Reviewed with MAYTE Anna RN (22 2013 : Melisa Mchugh, RN)

## 2022-03-01 NOTE — PROGRESS NOTES
[facility]  Progress Note - Antepartum    Name: Zahra Gasca  MRN: [id number]  Location: M767/1   Date: 3/1/2022  CSN: [id number]     HD #36 - Admitted at 30w0d for recurrent vaginal bleeding with known placenta previa and FGR.     SUBJECTIVE  Patient seen and examined.  Patient has no complaints.  Denies any contractions, LOF, or VB.  Reports good FM.  Denies any fevers, chills, chest pain, SOB, abdominal pain, or calf pain.    OBJECTIVE  /69 (BP Location: Left arm, Patient Position: Sitting)   Pulse 86   Temp 98.1 °F (36.7 °C) (Oral)   Resp 16   Wt 69.9 kg (154 lb)   LMP 06/26/2021 (Approximate)   SpO2 98%   Breastfeeding No   BMI 27.28 kg/m²   Gen: NAD, AAO x3, pleasant  CV: RRR, no murmurs/rubs/gallops  Lungs: CTAB, no wheezes/rhonchi/rales  Abd: Soft, gravid, uterus nontender  Ext: Mild BLE edema (nonpitting)    IMAGING  US Ob Follow Up Transabdominal Approach    Result Date: 2/28/2022  Ultrasound fetal biophysical profile without nonstress testing Ultrasound color flow Doppler umbilical artery February 28, 2022 INDICATION: Follow-up placenta previa FINDINGS: I asked measures 3.9 cm. Cervical os closed. Posterior placenta previa chest crosses the internal os consistent with persistent posterior placenta previa. No evidence of abruption. Normal amount of amniotic fluid is present with an amniotic fluid index of 11.3 cm. Maximal vertical pocket measures 4.24 cm. Fetal cardiac activity identified at 133 bpm. Fetal cardiac and somatic activity identified. No fetal respiratory activity. Physical profile score of 8/8 obtained. Limited fetal survey unremarkable. Peak systolic umbilical artery velocity 44.7 and 53.8 cm/s. Peak systolic/end diastolic umbilical artery velocity ratio 4.1 and 3.3     Single intrauterine gestation with posterior placenta previa. 6/8 biophysical profile score as above. Prominent umbilical artery peak systolic/end diastolic velocity ratio. Close  follow-up recommended. Electronically signed by:  Gregory Baez MD  2/28/2022 7:50 AM CST Workstation: [id number]    US Ob Follow Up Transabdominal Approach    Addendum Date: 2/7/2022     ADDENDUM ADDENDUM #1 Umbilical arteries are patent and demonstrates preserved vascularity. The RI ranges from 0.56 - 0.61. Electronically signed by:  Tommie Bailey MD  2/7/2022 1:20 PM CST Workstation: [id number]     Result Date: 2/7/2022  US FETAL BIOPHYSICAL PROFILE WITHOUT NON STRESS TEST, US PREGNANCY FOLLOW UP CLINICAL STATEMENT: bpp/iugr COMPARISON:  2/3/2022 FINDINGS: Single viable IUP of approximately 29 weeks and one day. Fetus in cephalic presentation. Placenta lies posteriorly and there is complete placenta previa. Biophysical profile score 8 out of 8 points. Estimated fetal weight 1417 g, less than 3rd percentile. MIKIE is within normal limits at 12.4 cm.     Single viable IUP of 29 weeks and one day with IUGR. Biophysical profile score and MIKIE maintained within normal limits. Electronically signed by:  Tommie Bailey MD  2/7/2022 9:35 AM CST Workstation: [id number]    US Fetal Biophysical Profile;Without Non-Stress Testing    Result Date: 2/28/2022  Ultrasound fetal biophysical profile without nonstress testing Ultrasound color flow Doppler umbilical artery February 28, 2022 INDICATION: Follow-up placenta previa FINDINGS: I asked measures 3.9 cm. Cervical os closed. Posterior placenta previa chest crosses the internal os consistent with persistent posterior placenta previa. No evidence of abruption. Normal amount of amniotic fluid is present with an amniotic fluid index of 11.3 cm. Maximal vertical pocket measures 4.24 cm. Fetal cardiac activity identified at 133 bpm. Fetal cardiac and somatic activity identified. No fetal respiratory activity. Physical profile score of 8/8 obtained. Limited fetal survey unremarkable. Peak systolic umbilical artery velocity 44.7 and 53.8 cm/s. Peak systolic/end diastolic umbilical artery  velocity ratio 4.1 and 3.3     Single intrauterine gestation with posterior placenta previa. 6/8 biophysical profile score as above. Prominent umbilical artery peak systolic/end diastolic velocity ratio. Close follow-up recommended. Electronically signed by:  Gregory Baez MD  2/28/2022 7:50 AM CST Workstation: [id number]    US Fetal Biophysical Profile;Without Non-Stress Testing    Result Date: 2/23/2022  Ultrasound fetal biophysical profile score without nonstress testing. Ultrasound color flow Doppler umbilical artery February 23, 2022 INDICATION: Intrauterine growth retardation FINDINGS: Single intrauterine gestation with fetus in a cephalic presentation. Posterior placenta with known placenta previa. Normal amount of amniotic fluid with an amniotic fluid index of 17.2 cm. Maximal vertical pocket 5.5 cm. Fetal cardiac activity identified at 145 bpm. Fetal somatic activity identified. Biophysical profile score 8/8 obtained. Peak systolic umbilical artery velocity 62.8 cm/s and 49 cm/s. Peak systolic/end-diastolic umbilical artery ratio 3.3 and 3.6.     Single intrauterine gestation as described. Increasing peak systolic and diastolic ratios should be viewed with some concern. Known posterior placenta previa. 8/8 biophysical profile score. Electronically signed by:  Gregory Baez MD  2/23/2022 11:44 AM CST Workstation: [id number]    US Fetal Biophysical Profile;Without Non-Stress Testing    Result Date: 2/21/2022  Ultrasound biophysical profile without nonstress testing. Ultrasound color flow Doppler umbilical artery February 21, 2022 INDICATION: Intrauterine growth retardation follow-up FINDINGS: Single intrauterine gestation with fetus in a cephalic presentation. Known posterior placenta previa again seen. Amniotic fluid index 12.4 cm. Maximal vertical pocket 3.4 cm. Fetal cardiac activity identified at 137 bpm. Fetal somatic activity identified. Biophysical profile score 8/8 obtained. Umbilical artery  peak systolic flow velocities 54 cm/s and 48 cm/s. Umbilical artery peak systolic to end-diastolic velocity ratio 2.8. Waveforms grossly normal.     Single intrauterine gestation as above. Posterior placenta previa as previously. Some increase in umbilical artery peak systolic flow velocities with grossly stable peak systolic/end-diastolic ratio. 8/8 biophysical profile score. Electronically signed by:  Gregory Baez MD  2/21/2022 8:59 AM CST Workstation: [id number]    US Fetal Biophysical Profile;Without Non-Stress Testing    Result Date: 2/17/2022  Fetal biophysical profile without nonstress testing. Ultrasound color flow Doppler umbilical artery. HISTORY:  Intrauterine growth retardation. Transabdominal ultrasound of the pelvis was performed including duplex Doppler evaluation of the umbilical artery. FINDINGS: Fetal biophysical profile eight out of eight. Single fetus in cephalic presentation. Posterior placenta with placenta previa. Amniotic fluid index 9.47 cm. Maximum vertical pocket 3.20 cm. Fetal heart rate 151.22 bpm. Umbilical artery peak systolic velocities of 33.3 cm/s and 37.9 cm/s. Umbilical artery peak systolic to end-diastolic velocity ratios of 2.7 and 2.7.     CONCLUSION: Fetal biophysical profile eight out of eight. Posterior placenta with placenta previa. Umbilical artery peak systolic velocities of 33.3 cm/s and 37.9 cm/s. Umbilical artery peak systolic to end-diastolic velocity ratios of 2.7 and 2.7. [id number] Electronically signed by:  Jose Luis Hernandez MD  2/16/2022 10:28 AM CST Workstation: [id number]    US Fetal Biophysical Profile;Without Non-Stress Testing    Result Date: 2/14/2022  Ultrasound biophysical profile score without nonstress testing. Ultrasound color flow Doppler umbilical artery analysis February 14, 2022 INDICATION: Follow-up IUGR FINDINGS: Single intrauterine gestation with fetus in a cephalic presentation. Fetal heart rate 140 bpm. Amniotic fluid index normal at 11.4 cm. Maximal  vertical pocket 6.1 cm. Placenta posterior appears at least low lying in position. Umbilical artery peak systolic flow velocity 47 cm/s. End-diastolic umbilical artery flow velocity 13.6 cm. Peak systolic/end-diastolic ratio 3.5 Biophysical profile score 8/8..     Single intrauterine gestation with 8/8 biophysical profile score. At least low-lying placenta. Placenta previa suspected. Interval increase in peak systolic and diastolic umbilical artery ratio should be viewed with concern though this been no gross change in the end-diastolic flow velocity. Continued close evaluation/follow-up recommended. Electronically signed by:  Gregory Baez MD  2/14/2022 8:00 AM CST Workstation: [id number]    US Fetal Biophysical Profile;Without Non-Stress Testing    Result Date: 2/10/2022  Ultrasound fetal biophysical profile score without nonstress testing Ultrasound Doppler umbilical artery February 10, 2022 INDICATION: Intrauterine growth retardation follow-up FINDINGS: Single intrauterine gestation with fetus in a cephalic presentation. Placenta posterior appears at least low-lying in position. Fetal cardiac activity identified at 135 bpm. Amniotic fluid index normal at 9.7 cm. Maximal vertical pocket 3.4 cm. Umbilical artery peak systolic flow velocity 32 cm/s. End-diastolic umbilical artery flow velocity 13.6 cm. Peak systolic to end-diastolic ratio 2.3. Biophysical profile score of 8/8 obtained.     Single intrauterine gestation as described. Known placenta previa. 8/8 biophysical profile score. Umbilical artery Doppler analysis within normal limits. Electronically signed by:  Gregory Baez MD  2/10/2022 9:37 AM CST Workstation: [id number]    US Fetal Biophysical Profile;Without Non-Stress Testing    Addendum Date: 2/7/2022     ADDENDUM ADDENDUM #1 Umbilical arteries are patent and demonstrates preserved vascularity. The RI ranges from 0.56 - 0.61. Electronically signed by:  Tommie Bailey MD  2/7/2022 1:20 PM CST Workstation:  [id number]     Result Date: 2/7/2022  US FETAL BIOPHYSICAL PROFILE WITHOUT NON STRESS TEST, US PREGNANCY FOLLOW UP CLINICAL STATEMENT: bpp/iugr COMPARISON:  2/3/2022 FINDINGS: Single viable IUP of approximately 29 weeks and one day. Fetus in cephalic presentation. Placenta lies posteriorly and there is complete placenta previa. Biophysical profile score 8 out of 8 points. Estimated fetal weight 1417 g, less than 3rd percentile. MIKIE is within normal limits at 12.4 cm.     Single viable IUP of 29 weeks and one day with IUGR. Biophysical profile score and MIKIE maintained within normal limits. Electronically signed by:  Tommie Bailey MD  2/7/2022 9:35 AM CST Workstation: [id number]    US Fetal Biophysical Profile;Without Non-Stress Testing    Addendum Date: 2/4/2022     ADDENDUM ADDENDUM #1 Umbilical artery Doppler: Shows continuous and diastolic flow on the waveforms submitted. Umbilical artery 1: Peak systolic velocity 43.4 cm/s, end diastolic velocity 18.8 cm/s, PS/ED 2.3 Umbilical artery 2: Peak systolic velocity 48.6 cm/s, end diastolic velocity 18.8 cm/s, PS/ED 2.6 Electronically signed by:  Tobias Arenas MD  2/4/2022 8:48 AM CST Workstation: [id number]     Result Date: 2/4/2022  Ultrasound biophysical profile without nonstress testing February 3, 2022 INDICATION: Intrauterine growth retardation FINDINGS: Single intrauterine gestation with fetus in a cephalic presentation. Posterior placenta appears to reach down to cover the internal os. There may be some mild funneling of the internal os.. Overall cervix appears largely closed. Normal amount of amniotic fluid with an amniotic fluid index of 12 cm. Largest pocket 4.4 cm. Fetal cardiac activity identified at 151 bpm. No fetal respiratory activity identified during the examination. 6/8 biophysical profile score resulting.     Single intrauterine gestation as described. Persistent placenta previa. Mild funneling of the internal os. Cervix otherwise appears largely closed.  6/8 biophysical profile score with two points to document the lack of demonstrable fetal respiratory activity. Electronically signed by:  Gregory Baez MD  2/3/2022 9:31 AM CST Workstation: [id number]    US Fetal Biophysical Profile;Without Non-Stress Testing    Result Date: 2/1/2022  Fetal biophysical profile without nonstress testing. Ultrasound color flow Doppler umbilical artery. HISTORY:  Intrauterine growth retardation. Transabdominal ultrasound of the pelvis was performed including duplex Doppler evaluation of the umbilical artery. FINDINGS: Fetal biophysical profile eight out of eight. Single fetus in cephalic presentation. Low lying posterior placenta. Amniotic fluid index 12.63 cm. Maximum vertical pocket 4.51 cm. Fetal heart rate 155.45 bpm. Umbilical artery peak systolic velocities of 36.1 cm/s and 23.4 cm/s. Umbilical artery peak systolic to end-diastolic velocity ratios of 2.5 and 3.4.     CONCLUSION: Fetal biophysical profile eight out of eight. Low lying posterior placenta. Umbilical artery peak systolic velocities of 36.1 cm/s and 23.4 cm/s. Umbilical artery peak systolic to end-diastolic velocity ratios of 2.5 and 3.4. [id number] Electronically signed by:  Jose Luis Hernandez MD  2/1/2022 2:10 PM CST Workstation: [id number]    US Color Flow Doppler Umbilical Artery    Result Date: 2/28/2022  Ultrasound fetal biophysical profile without nonstress testing Ultrasound color flow Doppler umbilical artery February 28, 2022 INDICATION: Follow-up placenta previa FINDINGS: I asked measures 3.9 cm. Cervical os closed. Posterior placenta previa chest crosses the internal os consistent with persistent posterior placenta previa. No evidence of abruption. Normal amount of amniotic fluid is present with an amniotic fluid index of 11.3 cm. Maximal vertical pocket measures 4.24 cm. Fetal cardiac activity identified at 133 bpm. Fetal cardiac and somatic activity identified. No fetal respiratory activity. Physical  profile score of 8/8 obtained. Limited fetal survey unremarkable. Peak systolic umbilical artery velocity 44.7 and 53.8 cm/s. Peak systolic/end diastolic umbilical artery velocity ratio 4.1 and 3.3     Single intrauterine gestation with posterior placenta previa. 6/8 biophysical profile score as above. Prominent umbilical artery peak systolic/end diastolic velocity ratio. Close follow-up recommended. Electronically signed by:  Gregory Baez MD  2/28/2022 7:50 AM CST Workstation: [id number]    US Color Flow Doppler Umbilical Artery    Result Date: 2/23/2022  Ultrasound fetal biophysical profile score without nonstress testing. Ultrasound color flow Doppler umbilical artery February 23, 2022 INDICATION: Intrauterine growth retardation FINDINGS: Single intrauterine gestation with fetus in a cephalic presentation. Posterior placenta with known placenta previa. Normal amount of amniotic fluid with an amniotic fluid index of 17.2 cm. Maximal vertical pocket 5.5 cm. Fetal cardiac activity identified at 145 bpm. Fetal somatic activity identified. Biophysical profile score 8/8 obtained. Peak systolic umbilical artery velocity 62.8 cm/s and 49 cm/s. Peak systolic/end-diastolic umbilical artery ratio 3.3 and 3.6.     Single intrauterine gestation as described. Increasing peak systolic and diastolic ratios should be viewed with some concern. Known posterior placenta previa. 8/8 biophysical profile score. Electronically signed by:  Gregory Baez MD  2/23/2022 11:44 AM CST Workstation: [id number]    US Color Flow Doppler Umbilical Artery    Result Date: 2/21/2022  Ultrasound biophysical profile without nonstress testing. Ultrasound color flow Doppler umbilical artery February 21, 2022 INDICATION: Intrauterine growth retardation follow-up FINDINGS: Single intrauterine gestation with fetus in a cephalic presentation. Known posterior placenta previa again seen. Amniotic fluid index 12.4 cm. Maximal vertical pocket 3.4 cm.  Fetal cardiac activity identified at 137 bpm. Fetal somatic activity identified. Biophysical profile score 8/8 obtained. Umbilical artery peak systolic flow velocities 54 cm/s and 48 cm/s. Umbilical artery peak systolic to end-diastolic velocity ratio 2.8. Waveforms grossly normal.     Single intrauterine gestation as above. Posterior placenta previa as previously. Some increase in umbilical artery peak systolic flow velocities with grossly stable peak systolic/end-diastolic ratio. 8/8 biophysical profile score. Electronically signed by:  Gregory Baez MD  2/21/2022 8:59 AM CST Workstation: [id number]    US Color Flow Doppler Umbilical Artery    Result Date: 2/17/2022  Fetal biophysical profile without nonstress testing. Ultrasound color flow Doppler umbilical artery. HISTORY:  Intrauterine growth retardation. Transabdominal ultrasound of the pelvis was performed including duplex Doppler evaluation of the umbilical artery. FINDINGS: Fetal biophysical profile eight out of eight. Single fetus in cephalic presentation. Posterior placenta with placenta previa. Amniotic fluid index 9.47 cm. Maximum vertical pocket 3.20 cm. Fetal heart rate 151.22 bpm. Umbilical artery peak systolic velocities of 33.3 cm/s and 37.9 cm/s. Umbilical artery peak systolic to end-diastolic velocity ratios of 2.7 and 2.7.     CONCLUSION: Fetal biophysical profile eight out of eight. Posterior placenta with placenta previa. Umbilical artery peak systolic velocities of 33.3 cm/s and 37.9 cm/s. Umbilical artery peak systolic to end-diastolic velocity ratios of 2.7 and 2.7. [id number] Electronically signed by:  Jose Luis Hernandez MD  2/16/2022 10:28 AM CST Workstation: [id number]    US Color Flow Doppler Umbilical Artery    Result Date: 2/14/2022  Ultrasound biophysical profile score without nonstress testing. Ultrasound color flow Doppler umbilical artery analysis February 14, 2022 INDICATION: Follow-up IUGR FINDINGS: Single intrauterine gestation with fetus in  a cephalic presentation. Fetal heart rate 140 bpm. Amniotic fluid index normal at 11.4 cm. Maximal vertical pocket 6.1 cm. Placenta posterior appears at least low lying in position. Umbilical artery peak systolic flow velocity 47 cm/s. End-diastolic umbilical artery flow velocity 13.6 cm. Peak systolic/end-diastolic ratio 3.5 Biophysical profile score 8/8..     Single intrauterine gestation with 8/8 biophysical profile score. At least low-lying placenta. Placenta previa suspected. Interval increase in peak systolic and diastolic umbilical artery ratio should be viewed with concern though this been no gross change in the end-diastolic flow velocity. Continued close evaluation/follow-up recommended. Electronically signed by:  Gregory Baez MD  2/14/2022 8:00 AM CST Workstation: [id number]    US Color Flow Doppler Umbilical Artery    Result Date: 2/10/2022  Ultrasound fetal biophysical profile score without nonstress testing Ultrasound Doppler umbilical artery February 10, 2022 INDICATION: Intrauterine growth retardation follow-up FINDINGS: Single intrauterine gestation with fetus in a cephalic presentation. Placenta posterior appears at least low-lying in position. Fetal cardiac activity identified at 135 bpm. Amniotic fluid index normal at 9.7 cm. Maximal vertical pocket 3.4 cm. Umbilical artery peak systolic flow velocity 32 cm/s. End-diastolic umbilical artery flow velocity 13.6 cm. Peak systolic to end-diastolic ratio 2.3. Biophysical profile score of 8/8 obtained.     Single intrauterine gestation as described. Known placenta previa. 8/8 biophysical profile score. Umbilical artery Doppler analysis within normal limits. Electronically signed by:  Gregory Baez MD  2/10/2022 9:37 AM CST Workstation: [id number]    US Color Flow Doppler Umbilical Artery    Addendum Date: 2/7/2022     ADDENDUM ADDENDUM #1 Umbilical arteries are patent and demonstrates preserved vascularity. The RI ranges from 0.56 - 0.61.  Electronically signed by:  Tommie Bailey MD  2/7/2022 1:20 PM CST Workstation: [id number]     Result Date: 2/7/2022  US FETAL BIOPHYSICAL PROFILE WITHOUT NON STRESS TEST, US PREGNANCY FOLLOW UP CLINICAL STATEMENT: bpp/iugr COMPARISON:  2/3/2022 FINDINGS: Single viable IUP of approximately 29 weeks and one day. Fetus in cephalic presentation. Placenta lies posteriorly and there is complete placenta previa. Biophysical profile score 8 out of 8 points. Estimated fetal weight 1417 g, less than 3rd percentile. MIKIE is within normal limits at 12.4 cm.     Single viable IUP of 29 weeks and one day with IUGR. Biophysical profile score and MIKIE maintained within normal limits. Electronically signed by:  Tommie Bailey MD  2/7/2022 9:35 AM CST Workstation: [id number]    US Color Flow Doppler Umbilical Artery    Addendum Date: 2/4/2022     ADDENDUM ADDENDUM #1 Umbilical artery Doppler: Shows continuous and diastolic flow on the waveforms submitted. Umbilical artery 1: Peak systolic velocity 43.4 cm/s, end diastolic velocity 18.8 cm/s, PS/ED 2.3 Umbilical artery 2: Peak systolic velocity 48.6 cm/s, end diastolic velocity 18.8 cm/s, PS/ED 2.6 Electronically signed by:  Tobias Arenas MD  2/4/2022 8:48 AM CST Workstation: [id number]     Result Date: 2/4/2022  Ultrasound biophysical profile without nonstress testing February 3, 2022 INDICATION: Intrauterine growth retardation FINDINGS: Single intrauterine gestation with fetus in a cephalic presentation. Posterior placenta appears to reach down to cover the internal os. There may be some mild funneling of the internal os.. Overall cervix appears largely closed. Normal amount of amniotic fluid with an amniotic fluid index of 12 cm. Largest pocket 4.4 cm. Fetal cardiac activity identified at 151 bpm. No fetal respiratory activity identified during the examination. 6/8 biophysical profile score resulting.     Single intrauterine gestation as described. Persistent placenta previa. Mild funneling  of the internal os. Cervix otherwise appears largely closed. 68 biophysical profile score with two points to document the lack of demonstrable fetal respiratory activity. Electronically signed by:  Gregory Baez MD  2/3/2022 9:31 AM CST Workstation: [id number]    US Color Flow Doppler Umbilical Artery    Result Date: 2022  Fetal biophysical profile without nonstress testing. Ultrasound color flow Doppler umbilical artery. HISTORY:  Intrauterine growth retardation. Transabdominal ultrasound of the pelvis was performed including duplex Doppler evaluation of the umbilical artery. FINDINGS: Fetal biophysical profile eight out of eight. Single fetus in cephalic presentation. Low lying posterior placenta. Amniotic fluid index 12.63 cm. Maximum vertical pocket 4.51 cm. Fetal heart rate 155.45 bpm. Umbilical artery peak systolic velocities of 36.1 cm/s and 23.4 cm/s. Umbilical artery peak systolic to end-diastolic velocity ratios of 2.5 and 3.4.     CONCLUSION: Fetal biophysical profile eight out of eight. Low lying posterior placenta. Umbilical artery peak systolic velocities of 36.1 cm/s and 23.4 cm/s. Umbilical artery peak systolic to end-diastolic velocity ratios of 2.5 and 3.4. [id number] Electronically signed by:  Jose Luis Hernandez MD  2022 2:10 PM CST Workstation: [id number]  Zahra Gasca is 36 y.o.  at 35w3d admitted for recurrent bleeding with known placenta previa. Vitals are stable with no active bleeding.     PLAN  1.  Recurrent vaginal bleeding with known placenta previa, stable  - NST BID or PRN  - Disposition: Inpatient management until delivery via CS at 36 weeks unless sooner for worsening bleeding or other indications.  Inpatient admission required given risks of catastrophic outcomes including maternal and/or fetal death should she go home and have a catastrophic bleed.  - Delivery scheduled for 3/7 at 36w2d as when she falls at 36 weeks, it will be on the weekend.   Given the limited resources available on the weekend, joint decision made with the patient and OB staff to proceed with delivery during weekday.      2.  Severe FGR <3%ile  - BPP w/ UAD 2x/week (since severe IUGR)     3.  GDMA1  - Fasting and 2h PP (started checking 2h PP previously)  - BS within goal or near goal     4.  History of preeclampsia with severe features  - Monitor BP     5.  Prior  section x1  - Reviewed 11% risk of accreta  - Plan for RLTCS especially given placenta previa  - Declines permanent sterilization     7.  Rh negative  - s/p RhoGAM on     This document has been electronically signed by Guzman Dunaway, Medical Student on 2022 06:22 CST.    Attending Attestation  I have seen and evaluated the patient.  I have discussed the case with the student.  I have reviewed the notes, assessment and plan, and/or procedures performed by the resident.  I concur with the student's documentation.     Patient seen and examined.  Doing well, no concerns today.  Denies any VB.  Reports good FM.  BS appropriate.     /55 (BP Location: Right arm, Patient Position: Lying)   Pulse 81   Temp 98 °F (36.7 °C) (Oral)   Resp 18   Wt 69.9 kg (154 lb)   LMP 2021 (Approximate)   SpO2 98%   Breastfeeding No   BMI 27.28 kg/m²    Gen: NAD, AAO x3  Abd: Soft, NTND, uterus nontender     IMAGING  2022: cephalic, posterior placenta marginal previa, MIKIE 12.04 cm, EFW 9%ile, AC 18%ile  2022: BPP 8/8, UAD elevated  2022: BPP 8/8, UAD WNL  2022: BPP 8/8, UAD WNL, low-lying posterior placenta, cephalic  2/3/2022: BPP 6/8, UAD WNL, placenta previa, cephalic  2022: BPP 8/8, EFW 1417g (<3%ile) w/ AC <3%ile, UAD WNL  2/10/2022: BPP 8/8, UAD WNL  2022: BPP 8/8, UAD borderline (S/D 3.5)  2022: BPP 8/8, UAD borderline (S/D 3.3, 3.7)  2022: BPP 8/8, UAD WNL  2022: BPP 8/8, UAD WNL  2022: EFW 2053g (4%ile) w/ AC 6%ile, BPP 6/8, MIKIE 11.3 cm, UAD borderline  (S/D 3.3, 4.1)     ASSESSMENT  Zahra Gasca is 36 y.o.  at 35w3d admitted for significant repeat bleeding with known placenta previa.  Hemodynamically stable with no evidence of ongoing active bleed.  Obstetrically stable with reassuring fetal status with FGR.     PLAN  1.  Recurrent vaginal bleeding with known placenta previa, stable  - NST BID or PRN  - s/p BMZ benefit  (2nd course)  - NST BID  - Disposition: Inpatient management until delivery via CS at 36 weeks unless sooner for worsening bleeding or other indications.  Inpatient admission required given risks of catastrophic outcomes including maternal and/or fetal death should she go home and have a catastrophic bleed.  - Delivery scheduled for 3/7 at 36w2d as when she falls at 36 weeks, it will be on the weekend.  Given the limited resources available on the weekend, joint decision made with the patient and OB staff to proceed with delivery during weekday.      2.  Severe FGR <3%ile  - TORCH titers ordered  - NIPT low risk  - BPP w/ UAD 2x/week (since severe IUGR)  - Next GS on      3.  GDMA1  - Fasting and 2h PP (started checking 2h PP previously)  - BS within goal or near goal     4.  History of preeclampsia with severe features  - Monitor BP     5.  Prior  section x1  - Reviewed 11% risk of accreta  - Plan for RLTCS especially given placenta previa  - Declines permanent sterilization     7.  Rh negative  - s/p RhoGAM on     This document has been electronically signed by Alysha Curran MD on 2022 07:32 CST.

## 2022-03-01 NOTE — PLAN OF CARE
Problem: Adult Inpatient Plan of Care  Goal: Plan of Care Review  Outcome: Ongoing, Progressing  Flowsheets  Taken 3/1/2022 0347 by Melisa Mchugh, RN  Progress: no change  Outcome Summary: VSS. C/o of some nausea during the night. NST reactive. Pt resting at this time  Taken 2/28/2022 1601 by Khloe Oden, RN  Plan of Care Reviewed With: patient   Goal Outcome Evaluation:           Progress: no change  Outcome Summary: VSS. C/o of some nausea during the night. NST reactive. Pt resting at this time

## 2022-03-01 NOTE — NON STRESS TEST
Zahra Gasca, a  at 35w3d with an ALEXEY of 2022, by Last Menstrual Period, was seen at [facility] for a nonstress test.    Chief Complaint   Patient presents with   • Vaginal Bleeding     1215   • other     reports fetal movement       Patient Active Problem List   Diagnosis   • Anxiety state   • Multigravida of advanced maternal age in second trimester   • Anxiety during pregnancy   • Nausea and vomiting during pregnancy prior to 22 weeks gestation   • History of severe pre-eclampsia   • Previous  delivery affecting pregnancy   • Supervision of high risk pregnancy in second trimester   • Placenta previa in second trimester   • Nephrolithiasis   • Placenta previa antepartum       Start Time: 1426  Stop Time: 1446    Interpretation A  Comments A: Reviewed with MICHAEL Nielson RN (22 142 : Kristie Dickerson, RN)    Patient states no bleeding, no contractions and feeling fetal movement.

## 2022-03-02 ENCOUNTER — APPOINTMENT (OUTPATIENT)
Dept: ULTRASOUND IMAGING | Facility: HOSPITAL | Age: 37
End: 2022-03-02

## 2022-03-02 LAB
CMV IGG SERPL IA-ACNC: <0.6 U/ML (ref 0–0.59)
CMV IGM SERPL IA-ACNC: <30 AU/ML (ref 0–29.9)
GLUCOSE BLDC GLUCOMTR-MCNC: 107 MG/DL (ref 70–130)
GLUCOSE BLDC GLUCOMTR-MCNC: 111 MG/DL (ref 70–130)
GLUCOSE BLDC GLUCOMTR-MCNC: 84 MG/DL (ref 70–130)
GLUCOSE BLDC GLUCOMTR-MCNC: 87 MG/DL (ref 70–130)
HSV1 IGG SER IA-ACNC: 13.1 INDEX (ref 0–0.9)
HSV1+2 IGM SER IA-ACNC: 1.19 RATIO (ref 0–0.9)
HSV2 IGG SER IA-ACNC: <0.91 INDEX (ref 0–0.9)
LABORATORY COMMENT REPORT: NORMAL
T GONDII IGG SERPL IA-ACNC: <3 IU/ML (ref 0–7.1)
T GONDII IGM SER IA-ACNC: <3 AU/ML (ref 0–7.9)

## 2022-03-02 PROCEDURE — 59025 FETAL NON-STRESS TEST: CPT | Performed by: OBSTETRICS & GYNECOLOGY

## 2022-03-02 PROCEDURE — 59025 FETAL NON-STRESS TEST: CPT

## 2022-03-02 PROCEDURE — 76820 UMBILICAL ARTERY ECHO: CPT

## 2022-03-02 PROCEDURE — 99232 SBSQ HOSP IP/OBS MODERATE 35: CPT | Performed by: OBSTETRICS & GYNECOLOGY

## 2022-03-02 PROCEDURE — 82962 GLUCOSE BLOOD TEST: CPT

## 2022-03-02 PROCEDURE — 76819 FETAL BIOPHYS PROFIL W/O NST: CPT

## 2022-03-02 RX ADMIN — FERROUS SULFATE TAB EC 324 MG (65 MG FE EQUIVALENT) 324 MG: 324 (65 FE) TABLET DELAYED RESPONSE at 09:46

## 2022-03-02 RX ADMIN — Medication 1 TABLET: at 09:46

## 2022-03-02 NOTE — PROGRESS NOTES
[facility]  Progress Note - Antepartum    Name: Zahra Gasca  MRN: [id number]  Location: M767   Date: 3/2/2022  CSN: [id number]     HD #37 - Admitted at 30w0d for recurrent vaginal bleeding in the context of placenta previa and FGR.    SUBJECTIVE  Patient seen and examined.  Patient has no complaints.  Denies any contractions, LOF, or VB.  Reports good FM.  Denies any fevers, chills, chest pain, SOB, abdominal pain, or calf pain.    OBJECTIVE  /65 (BP Location: Right arm, Patient Position: Sitting)   Pulse 89   Temp 98.5 °F (36.9 °C) (Oral)   Resp 18   Wt 69.9 kg (154 lb)   LMP 2021 (Approximate)   SpO2 98%   Breastfeeding No   BMI 27.28 kg/m²   Gen: NAD, AAO x3, pleasant  CV: RRR, no murmurs/rubs/gallops  Lungs: CTAB, no wheezes/rhonchi/rales  Abd: Soft, gravid, uterus nontender  Ext: No edema    IMAGING  2022: cephalic, posterior placenta marginal previa, MIKIE 12.04 cm, EFW 9%ile, AC 18%ile  2022: BPP 8/8, UAD elevated  2022: BPP 8/8, UAD WNL  2022: BPP 8/8, UAD WNL, low-lying posterior placenta, cephalic  2/3/2022: BPP 6/8, UAD WNL, placenta previa, cephalic  2022: BPP 8/8, EFW 1417g (<3%ile) w/ AC <3%ile, UAD WNL  2/10/2022: BPP 8/8, UAD WNL  2022: BPP 8/8, UAD borderline (S/D 3.5)  2022: BPP 8/8, UAD borderline (S/D 3.3, 3.7)  2022: BPP 8/8, UAD WNL  2022: BPP 8/8, UAD WNL  2022: EFW 2053g (4%ile) w/ AC 6%ile, BPP 6/8, MIKIE 11.3 cm, UAD borderline (S/D 3.3, 4.1)    ASSESSMENT  Zahra Gasca is 36 y.o.  at 35w4d admitted for repeat bleeding with known placenta previa. Hemodynamically stable with no evidence of ongoing active bleed. Obstetrically stable with reassuring fetal status with FGR.     PLAN  1.  Recurrent vaginal bleeding with known placenta previa, stable  - NST BID or PRN  - Disposition: Inpatient management until delivery via CS at 36 weeks unless sooner for worsening bleeding or other  indications.  Inpatient admission required given risks of catastrophic outcomes including maternal and/or fetal death should she go home and have a catastrophic bleed.  - Delivery scheduled for 3/7 at 36w2d as when she falls at 36 weeks, it will be on the weekend.  Given the limited resources available on the weekend, joint decision made with the patient and OB staff to proceed with delivery during weekday.      2.  Severe FGR <3%ile  - BPP w/ UAD 2x/week (since severe IUGR)     3.  GDMA1  - Fasting and 2h PP (started checking 2h PP previously)  - BS within goal or near goal     4.  History of preeclampsia with severe features  - Monitor BP     5.  Prior  section x1  - Reviewed 11% risk of accreta  - Plan for RLTCS especially given placenta previa  - Declines permanent sterilization     7.  Rh negative  - s/p RhoGAM on     This document has been electronically signed by Guzman Dunaway, Medical Student on 2022 06:21 CST.    Attending Attestation  I have seen and evaluated the patient.  I have discussed the case with the student.  I have reviewed the notes, assessment and plan, and/or procedures performed by the resident.  I concur with the student's documentation.     Patient seen and examined.  Doing well, no concerns today.  Denies any VB.  Reports good FM.  BS appropriate.  Her  wanted to know regarding delivery early at 36 weeks.     /61 (BP Location: Right arm, Patient Position: Lying)   Pulse 96   Temp 98.4 °F (36.9 °C) (Oral)   Resp 18   Wt 69.9 kg (154 lb)   LMP 2021 (Approximate)   SpO2 97%   Breastfeeding No   BMI 27.28 kg/m²   Gen: NAD, AAO x3  Abd: Soft, NTND, uterus nontender     IMAGING  2022: cephalic, posterior placenta marginal previa, MIKIE 12.04 cm, EFW 9%ile, AC 18%ile  2022: BPP 8/8, UAD elevated  2022: BPP 8/8, UAD WNL  2022: BPP 8/8, UAD WNL, low-lying posterior placenta, cephalic  2/3/2022: BPP 6/8, UAD WNL, placenta previa,  cephalic  2022: BPP 8/8, EFW 1417g (<3%ile) w/ AC <3%ile, UAD WNL  2/10/2022: BPP 8/8, UAD WNL  2022: BPP 8/8, UAD borderline (S/D 3.5)  2022: BPP 8/8, UAD borderline (S/D 3.3, 3.7)  2022: BPP 8/8, UAD WNL  2022: BPP 8/8, UAD WNL  2022: EFW 2053g (4%ile) w/ AC 6%ile, BPP 6/8, MIKIE 11.3 cm, UAD borderline (S/D 3.3, 4.1)  3/2/2022: BPP 8/8, UAD WNL     ASSESSMENT  Zahra Gasca is 36 y.o.  at 35w4d admitted for significant repeat bleeding with known placenta previa.  Hemodynamically stable with no evidence of ongoing active bleed.  Obstetrically stable with reassuring fetal status with FGR.     PLAN  1.  Recurrent vaginal bleeding with known placenta previa, stable  - NST BID or PRN  - s/p BMZ benefit  (2nd course)  - NST BID  - Disposition: Inpatient management until delivery via CS at 36 weeks unless sooner for worsening bleeding or other indications.  Inpatient admission required given risks of catastrophic outcomes including maternal and/or fetal death should she go home and have a catastrophic bleed.  - Delivery scheduled for 3/7 at 36w2d as when she falls at 36 weeks, it will be on the weekend.  Given the limited resources available on the weekend, joint decision made with the patient and OB staff to proceed with delivery during weekday.      2.  Severe FGR <3%ile  - TORCH titers ordered  - NIPT low risk  - BPP w/ UAD 2x/week (since severe IUGR)  - Next GS on      3.  GDMA1  - Fasting and 2h PP (started checking 2h PP previously)  - BS within goal or near goal     4.  History of preeclampsia with severe features  - Monitor BP     5.  Prior  section x1  - Reviewed 11% risk of accreta  - Plan for RLTCS especially given placenta previa  - Declines permanent sterilization     7.  Rh negative  - s/p RhoGAM on     This document has been electronically signed by Alysha Curran MD on 2022 13:36 CST.

## 2022-03-02 NOTE — NON STRESS TEST
Zahra Gasca, a  at 35w3d with an ALEXEY of 2022, by Last Menstrual Period, was seen at [facility] for a nonstress test.    Chief Complaint   Patient presents with   • Vaginal Bleeding     1215   • other     reports fetal movement       Patient Active Problem List   Diagnosis   • Anxiety state   • Multigravida of advanced maternal age in second trimester   • Anxiety during pregnancy   • Nausea and vomiting during pregnancy prior to 22 weeks gestation   • History of severe pre-eclampsia   • Previous  delivery affecting pregnancy   • Supervision of high risk pregnancy in second trimester   • Placenta previa in second trimester   • Nephrolithiasis   • Placenta previa antepartum       Start Time:   Stop Time:

## 2022-03-02 NOTE — NON STRESS TEST
Zahra Gasca, a  at 35w4d with an ALEXEY of 2022, by Last Menstrual Period, was seen at [facility] for a nonstress test.    Chief Complaint   Patient presents with   • Vaginal Bleeding     1215   • other     reports fetal movement       Patient Active Problem List   Diagnosis   • Anxiety state   • Multigravida of advanced maternal age in second trimester   • Anxiety during pregnancy   • Nausea and vomiting during pregnancy prior to 22 weeks gestation   • History of severe pre-eclampsia   • Previous  delivery affecting pregnancy   • Supervision of high risk pregnancy in second trimester   • Placenta previa in second trimester   • Nephrolithiasis   • Placenta previa antepartum       Start Time: 951  Stop Time:     Interpretation A  Nonstress Test Interpretation A: Reactive (22 1011 : Larisa Calvert RN)  Comments A: Reviewed by MAIKEL Montgomery RN (22 : Larisa Calvert, RN)

## 2022-03-02 NOTE — PLAN OF CARE
Goal Outcome Evaluation:  Plan of Care Reviewed With: patient        Progress: no change  Outcome Summary: VSS, denied any pain. NST reactive. will continue monitor

## 2022-03-03 LAB
GLUCOSE BLDC GLUCOMTR-MCNC: 101 MG/DL (ref 70–130)
GLUCOSE BLDC GLUCOMTR-MCNC: 114 MG/DL (ref 70–130)
GLUCOSE BLDC GLUCOMTR-MCNC: 120 MG/DL (ref 70–130)
GLUCOSE BLDC GLUCOMTR-MCNC: 84 MG/DL (ref 70–130)

## 2022-03-03 PROCEDURE — 82962 GLUCOSE BLOOD TEST: CPT

## 2022-03-03 PROCEDURE — 99232 SBSQ HOSP IP/OBS MODERATE 35: CPT | Performed by: OBSTETRICS & GYNECOLOGY

## 2022-03-03 PROCEDURE — 59025 FETAL NON-STRESS TEST: CPT | Performed by: OBSTETRICS & GYNECOLOGY

## 2022-03-03 RX ADMIN — Medication 1 TABLET: at 09:37

## 2022-03-03 RX ADMIN — FERROUS SULFATE TAB EC 324 MG (65 MG FE EQUIVALENT) 324 MG: 324 (65 FE) TABLET DELAYED RESPONSE at 09:37

## 2022-03-03 NOTE — NON STRESS TEST
Zahra Gasca, a  at 35w5d with an ALEXEY of 2022, by Last Menstrual Period, was seen at [facility] for a nonstress test.    Chief Complaint   Patient presents with   • Vaginal Bleeding     1215   • other     reports fetal movement       Patient Active Problem List   Diagnosis   • Anxiety state   • Multigravida of advanced maternal age in second trimester   • Anxiety during pregnancy   • Nausea and vomiting during pregnancy prior to 22 weeks gestation   • History of severe pre-eclampsia   • Previous  delivery affecting pregnancy   • Supervision of high risk pregnancy in second trimester   • Placenta previa in second trimester   • Nephrolithiasis   • Placenta previa antepartum       Start Time:1400  Stop Time: 1420    Interpretation A  Nonstress Test Interpretation A: Reactive (22 : Carla Rincon, RN)  Comments A: Reviewed with DENISHA Francisco RN (22 : Carla Rincon, RN)

## 2022-03-03 NOTE — NON STRESS TEST
..    Zahra Gasca, a  at 35w5d with an ALEXEY of 2022, by Last Menstrual Period, was seen at [facility] for a nonstress test.    Chief Complaint   Patient presents with   • Vaginal Bleeding     1215   • other     reports fetal movement       Patient Active Problem List   Diagnosis   • Anxiety state   • Multigravida of advanced maternal age in second trimester   • Anxiety during pregnancy   • Nausea and vomiting during pregnancy prior to 22 weeks gestation   • History of severe pre-eclampsia   • Previous  delivery affecting pregnancy   • Supervision of high risk pregnancy in second trimester   • Placenta previa in second trimester   • Nephrolithiasis   • Placenta previa antepartum       Start Time:  Stop Time:   Fetal heart rate strip reviewed with staff agree reactive

## 2022-03-03 NOTE — PROGRESS NOTES
[facility]  Progress Note - Antepartum    Name: Zahra Gasca  MRN: [id number]  Location: M767   Date: 3/3/2022  CSN: [id number]     HD #38 - Admitted at 30w0d for recurrent vaginal bleeding in the context of placenta previa and FGR.    SUBJECTIVE  Patient seen and examined.  Patient has no complaints.  Denies any contractions, LOF, or VB.  Reports good FM.  Denies any fevers, chills, chest pain, SOB, abdominal pain, or calf pain.    OBJECTIVE  /57 (BP Location: Left arm, Patient Position: Lying)   Pulse 79   Temp 98.9 °F (37.2 °C) (Oral)   Resp 16   Wt 69.9 kg (154 lb)   LMP 2021 (Approximate)   SpO2 97%   Breastfeeding No   BMI 27.28 kg/m²   Gen: NAD, AAO x3, pleasant  CV: RRR, no murmurs/rubs/gallops  Lungs: CTAB, no wheezes/rhonchi/rales  Abd: Soft, gravid, uterus nontender  Ext: No edema      IMAGING  2022: cephalic, posterior placenta marginal previa, MIKIE 12.04 cm, EFW 9%ile, AC 18%ile  2022: BPP 8/8, UAD elevated  2022: BPP 8/8, UAD WNL  2022: BPP 8/8, UAD WNL, low-lying posterior placenta, cephalic  2/3/2022: BPP 6/8, UAD WNL, placenta previa, cephalic  2022: BPP 8/8, EFW 1417g (<3%ile) w/ AC <3%ile, UAD WNL  2/10/2022: BPP 8/8, UAD WNL  2022: BPP 8/8, UAD borderline (S/D 3.5)  2022: BPP 8/8, UAD borderline (S/D 3.3, 3.7)  2022: BPP 8/8, UAD WNL  2022: BPP 8/8, UAD WNL  2022: EFW 2053g (4%ile) w/ AC 6%ile, BPP 6/8, MIKIE 11.3 cm, UAD borderline (S/D 3.3, 4.1)  3/2/2022: BPP 8/8, UAD WNL    ASSESSMENT  Zahra Gasca is 36 y.o.  at 35w5d admitted for significant repeat bleeding with known placenta previa.  Hemodynamically stable with no evidence of ongoing active bleed.  Obstetrically stable with reassuring fetal status with FGR.    PLAN  1.  Recurrent vaginal bleeding with known placenta previa, stable  - NST BID or PRN  - s/p BMZ benefit  (2nd course)  - Disposition: Inpatient management until  delivery via CS at 36 weeks unless sooner for worsening bleeding or other indications.  Inpatient admission required given risks of catastrophic outcomes including maternal and/or fetal death should she go home and have a catastrophic bleed.  - Delivery scheduled for 3/7 at 36w2d as when she falls at 36 weeks, it will be on the weekend.  Given the limited resources available on the weekend, joint decision made with the patient and OB staff to proceed with delivery during weekday.      2.  Severe FGR <3%ile  - TORCH titers ordered  - NIPT low risk  - BPP w/ UAD 2x/week (since severe IUGR)     3.  GDMA1  - Fasting and 2h PP (started checking 2h PP previously)  - BS within goal or near goal -- Glc 107 yesterday     4.  History of preeclampsia with severe features  - Monitor BP     5.  Prior  section x1  - Reviewed 11% risk of accreta  - Plan for RLTCS especially given placenta previa  - Declines permanent sterilization     7.  Rh negative  - s/p RhoGAM on     This document has been electronically signed by Guzman Dunaway, Medical Student on March 3, 2022 06:26 CST.    Attending Attestation  Late entry due to patient care.  I have seen and evaluated the patient on 3/3 at 7AM.  I have discussed the case with the student.  I have reviewed the notes, assessment and plan, and/or procedures performed by the student.  I concur with the resident’s documentation.     Patient seen and examined.  No concerns.  Denies VB.  Reports good FM.     /67 (BP Location: Left arm, Patient Position: Lying)   Pulse 107   Temp 98.4 °F (36.9 °C) (Oral)   Resp 16   Wt 69.9 kg (154 lb)   LMP 2021 (Approximate)   SpO2 97%   Breastfeeding No   BMI 27.28 kg/m²    Gen: NAD, AAO x3  Abd: Soft, NTND, uterus gravid and nontender, no masses palpated     A/P: Zahra Gasca is 36 y.o.  at 35w5d admitted for recurrent bleeding with known placenta previa.  Hemodynamically and obstetrically stable.  - Delivery  scheduled for Monday at 36w2d    This document has been electronically signed by Alysha Curran MD on March 4, 2022 14:27 CST.

## 2022-03-03 NOTE — PLAN OF CARE
Problem: Adult Inpatient Plan of Care  Goal: Plan of Care Review  Outcome: Ongoing, Progressing  Flowsheets  Taken 3/3/2022 0522 by Lisa Mcgee, RN  Progress: improving  Outcome Summary: VSS, denies bleeding or pain, NST reactive this shift, mood pleasent.  Taken 3/2/2022 1731 by Larisa Calvert RN  Plan of Care Reviewed With: patient   Goal Outcome Evaluation:  Plan of Care Reviewed With: patient        Progress: improving  Outcome Summary: VSS, denies bleeding or pain, NST reactive this shift, mood pleasent.

## 2022-03-03 NOTE — PAYOR COMM NOTE
"    Ayde Esparza RN [facility]  [phone]     Phone  [phone]      Fax  Cont. Stay Review      Zahra Zuleta (36 y.o. Female)             Date of Birth Social Security Number Address Home Phone MRN    [id number]  [id number]  [place] [address] [phone] [id number]    [employer] Mar[employer] Status             None        Admission Date Admission Type Admitting Provider Attending Provider Department, Room/Bed    1/22/22 Elective Noy Parra DO Smith, Amelia, DO [facility], M767/1    Discharge Date Discharge Disposition Discharge Destination                         Attending Provider: Noy Parra DO    Allergies: No Known Allergies    Isolation: None   Infection: None   Code Status: CPR   Advance Care Planning Activity    Ht: 160 cm (63\")   Wt: 69.9 kg (154 lb)    Admission Cmt: None   Principal Problem: None                Active Insurance as of 1/22/2022     Primary Coverage     Payor Plan Insurance Group Employer/Plan Group    [employer] [facility] PPO [id number]     Payor Plan Address Payor Plan Phone Number Payor Plan Fax Number Effective Dates    [address] [phone]  8/17/2019 - None Entered    [place] [id number]       Subscriber Name Subscriber Birth Date Member ID       VINAY ZULETA 11/30/1983 [id number]                 Emergency Contacts      (Rel.) Home Phone Work Phone Mobile Phone    Vinay Zuleta (Spouse) [phone] -- [phone]            Vital Signs (last day)     Date/Time Temp Temp src Pulse Resp BP Patient Position SpO2    03/03/22 0946 97.7 (36.5) Oral 86 18 102/56 Lying 97    03/03/22 0616 98.9 (37.2) Oral 79 16 113/57 Lying 97    03/02/22 1952 98.4 (36.9) Oral 92 18 112/68 Lying 97    03/02/22 1824 98.7 (37.1) Oral 98 18 110/65 Sitting 98    03/02/22 1357 98.2 (36.8) Oral 88 18 105/77 Lying 100    03/02/22 0946 98.4 (36.9) Oral 96 " 18 102/61 Lying 97    03/02/22 0623 98.1 (36.7) Oral 70 18 99/51 Lying 99          Oxygen Therapy (last day)     Date/Time SpO2 Device (Oxygen Therapy) Flow (L/min) Oxygen Concentration (%) ETCO2 (mmHg)    03/03/22 0946 97 room air -- -- --    03/03/22 0616 97 room air -- -- --    03/02/22 1952 97 room air -- -- --    03/02/22 1824 98 room air -- -- --    03/02/22 1357 100 room air -- -- --    03/02/22 0946 97 room air -- -- --    03/02/22 0623 99 room air -- -- --          Current Facility-Administered Medications   Medication Dose Route Frequency Provider Last Rate Last Admin   • acetaminophen (TYLENOL) tablet 1,000 mg  1,000 mg Oral Q6H PRN Noy Parra DO   1,000 mg at 02/03/22 1531   • diphenhydrAMINE (BENADRYL) capsule 25 mg  25 mg Oral Q6H PRN Alysha Curran MD   25 mg at 02/02/22 1501   • diphenhydrAMINE (BENADRYL) capsule 25 mg  25 mg Oral Nightly PRN Noy Parra DO       • docusate sodium (COLACE) capsule 100 mg  100 mg Oral BID Mick Parra MD   100 mg at 02/28/22 0755   • ferrous sulfate EC tablet 324 mg  324 mg Oral Daily With Breakfast Mick Parra MD   324 mg at 03/03/22 0937   • hydrOXYzine pamoate (VISTARIL) capsule 25 mg  25 mg Oral Q8H PRN Zain Chopra MD   25 mg at 02/21/22 2303   • ondansetron (ZOFRAN) tablet 4 mg  4 mg Oral Q8H PRN Noy Parra DO        Or   • ondansetron (ZOFRAN) injection 4 mg  4 mg Intravenous Q8H PRN Noy Parra DO       • prenatal vitamin tablet 1 tablet  1 tablet Oral Daily Alysah Curran MD   1 tablet at 03/03/22 0937   • sodium chloride 0.9 % flush 10 mL  10 mL Intravenous PRN Noy Parra DO       • sodium chloride nasal spray 2 spray  2 spray Each Nare PRN Alysha Curran MD       • vitamin B-6 (PYRIDOXINE) tablet 25 mg  25 mg Oral Nightly PRN Noy Parra DO         Physician Progress Notes (last 24 hours)  Notes from 03/02/22 1218 through 03/03/22 1218   No notes of this type exist  for this encounter.         Medical Student Notes (last 24 hours)  Notes from 03/02/22 1218 through 03/03/22 1218   No notes of this type exist for this encounter.         Consult Notes (last 24 hours)  Notes from 03/02/22 1218 through 03/03/22 1218   No notes of this type exist for this encounter.

## 2022-03-04 LAB
GLUCOSE BLDC GLUCOMTR-MCNC: 123 MG/DL (ref 70–130)
GLUCOSE BLDC GLUCOMTR-MCNC: 137 MG/DL (ref 70–130)
GLUCOSE BLDC GLUCOMTR-MCNC: 82 MG/DL (ref 70–130)
GLUCOSE BLDC GLUCOMTR-MCNC: 91 MG/DL (ref 70–130)

## 2022-03-04 PROCEDURE — 99232 SBSQ HOSP IP/OBS MODERATE 35: CPT | Performed by: OBSTETRICS & GYNECOLOGY

## 2022-03-04 PROCEDURE — 59025 FETAL NON-STRESS TEST: CPT | Performed by: STUDENT IN AN ORGANIZED HEALTH CARE EDUCATION/TRAINING PROGRAM

## 2022-03-04 PROCEDURE — 82962 GLUCOSE BLOOD TEST: CPT

## 2022-03-04 PROCEDURE — 59025 FETAL NON-STRESS TEST: CPT

## 2022-03-04 RX ORDER — VALACYCLOVIR HYDROCHLORIDE 500 MG/1
500 TABLET, FILM COATED ORAL EVERY 12 HOURS SCHEDULED
Status: DISCONTINUED | OUTPATIENT
Start: 2022-03-04 | End: 2022-03-07

## 2022-03-04 RX ADMIN — VALACYCLOVIR HYDROCHLORIDE 500 MG: 500 TABLET, FILM COATED ORAL at 09:39

## 2022-03-04 RX ADMIN — FERROUS SULFATE TAB EC 324 MG (65 MG FE EQUIVALENT) 324 MG: 324 (65 FE) TABLET DELAYED RESPONSE at 09:40

## 2022-03-04 RX ADMIN — VALACYCLOVIR HYDROCHLORIDE 500 MG: 500 TABLET, FILM COATED ORAL at 20:26

## 2022-03-04 RX ADMIN — Medication 1 TABLET: at 09:40

## 2022-03-04 NOTE — PROGRESS NOTES
[facility]  Progress Note - Antepartum    Name: Zahra Gasca  MRN: [id number]  Location: M767   Date: 3/4/2022  CSN: [id number]     HD #39 - Admitted at 30w0d for recurrent vaginal bleeding with known placenta previa and FGR.     SUBJECTIVE  Patient seen and examined.  Patient has no complaints.  Denies any contractions, LOF, or VB.  Reports good FM.  Denies any fevers, chills, chest pain, SOB, abdominal pain, or calf pain.    OBJECTIVE  /70 (BP Location: Left arm, Patient Position: Lying)   Pulse 83   Temp 98.1 °F (36.7 °C) (Oral)   Resp 16   Wt 69.9 kg (154 lb)   LMP 2021 (Approximate)   SpO2 99%   Breastfeeding No   BMI 27.28 kg/m²   Gen: NAD, AAO x3, pleasant  CV: RRR, no murmurs/rubs/gallops  Lungs: CTAB, no wheezes/rhonchi/rales  Abd: Soft, gravid, uterus nontender  Ext: No edema    IMAGING  2022: cephalic, posterior placenta marginal previa, MIKIE 12.04 cm, EFW 9%ile, AC 18%ile  2022: BPP 8/8, UAD elevated  2022: BPP 8/8, UAD WNL  2022: BPP 8/8, UAD WNL, low-lying posterior placenta, cephalic  2/3/2022: BPP 6/8, UAD WNL, placenta previa, cephalic  2022: BPP 8/8, EFW 1417g (<3%ile) w/ AC <3%ile, UAD WNL  2/10/2022: BPP 8/8, UAD WNL  2022: BPP 8/8, UAD borderline (S/D 3.5)  2022: BPP 8/8, UAD borderline (S/D 3.3, 3.7)  2022: BPP 8/8, UAD WNL  2022: BPP 8/8, UAD WNL  2022: EFW 2053g (4%ile) w/ AC 6%ile, BPP 6/8, MIKIE 11.3 cm, UAD borderline (S/D 3.3, 4.1)  3/2/2022: BPP 8/8, UAD WNL    ASSESSMENT  Zahra Gasca is 36 y.o.  at 35w6d admitted for significant repeat bleeding in the context of placenta previa.  Hemodynamically stable with no evidence of ongoing active bleed.  Obstetrically stable with reassuring fetal status with FGR.    PLAN  1.  Recurrent vaginal bleeding with known placenta previa, stable  - NST BID or PRN  - s/p BMZ benefit  (2nd course)  - Disposition: Inpatient management until  "delivery via CS at 36 weeks unless sooner for worsening bleeding or other indications.  Inpatient admission required given risks of catastrophic outcomes including maternal and/or fetal death should she go home and have a catastrophic bleed.  - Delivery scheduled for 3/7 at 36w2d as when she falls at 36 weeks, it will be on the weekend.  Given the limited resources available on the weekend, joint decision made with the patient and OB staff to proceed with delivery during weekday.      2.  Severe FGR <3%ile  - TORCH titers ordered -- Positive for HSV 1 IgG  - NIPT low risk  - BPP w/ UAD 2x/week (since severe IUGR)     3.  GDMA1  - Fasting and 2h PP (started checking 2h PP previously)  - BS within goal or near goal -- Glc 114 yesterday     4.  History of preeclampsia with severe features  - Monitor BP     5.  Prior  section x1  - Reviewed 11% risk of accreta  - Plan for RLTCS especially given placenta previa  - Declines permanent sterilization     7.  Rh negative  - s/p RhoGAM on     This document has been electronically signed by Guzman Dunaway, Medical Student on 2022 06:22 CST.    Attending Attestation  I have seen and evaluated the patient.  I have discussed the case with the student.  I have reviewed the notes, assessment and plan, and/or procedures performed by the student.  I concur with the resident’s documentation.    Patient seen and examined.  No concerns today other than a \"lump\" in her L groin.  Denies VB.  Reports good FM.    /67 (BP Location: Left arm, Patient Position: Lying)   Pulse 107   Temp 98.4 °F (36.9 °C) (Oral)   Resp 16   Wt 69.9 kg (154 lb)   LMP 2021 (Approximate)   SpO2 97%   Breastfeeding No   BMI 27.28 kg/m²   Gen: NAD, AAO x3  Abd: Soft, NTND, uterus gravid and nontender, no masses palpated    A/P: Zahra Gasca is 36 y.o.  at 35w6d admitted for recurrent bleeding with known placenta previa.  Hemodynamically and obstetrically " stable.  - Delivery scheduled for Monday at 36w2d    This document has been electronically signed by Alysha Curran MD on March 4, 2022 14:22 CST.

## 2022-03-04 NOTE — PLAN OF CARE
Problem: Adult Inpatient Plan of Care  Goal: Plan of Care Review  Outcome: Ongoing, Progressing  Flowsheets (Taken 3/4/2022 1706)  Progress: improving  Plan of Care Reviewed With: patient  Outcome Summary: Ambulating in room, NST reactive, weighed per patient request.   Goal Outcome Evaluation:  Plan of Care Reviewed With: patient        Progress: improving  Outcome Summary: Ambulating in room, NST reactive, weighed per patient request.

## 2022-03-04 NOTE — NON STRESS TEST
Zahra Gasca, a  at 35w6d with an ALEXEY of 2022, by Last Menstrual Period, was seen at [facility] for a nonstress test.    Chief Complaint   Patient presents with   • Vaginal Bleeding     1215   • other     reports fetal movement       Patient Active Problem List   Diagnosis   • Anxiety state   • Multigravida of advanced maternal age in second trimester   • Anxiety during pregnancy   • Nausea and vomiting during pregnancy prior to 22 weeks gestation   • History of severe pre-eclampsia   • Previous  delivery affecting pregnancy   • Supervision of high risk pregnancy in second trimester   • Placenta previa in second trimester   • Nephrolithiasis   • Placenta previa antepartum       Start Time:   Stop Time:     Interpretation A  Nonstress Test Interpretation A: Reactive (22 : Belinda Rizvi, RN)  Comments A: Reviewed with DOROTHY Eugene RN. (22 : Belinda Rizvi, RN)

## 2022-03-04 NOTE — NON STRESS TEST
Zahra Gasca, a  at 35w5d with an ALEXEY of 2022, by Last Menstrual Period, was seen at [facility] for a nonstress test.    Chief Complaint   Patient presents with   • Vaginal Bleeding     1215   • other     reports fetal movement       Patient Active Problem List   Diagnosis   • Anxiety state   • Multigravida of advanced maternal age in second trimester   • Anxiety during pregnancy   • Nausea and vomiting during pregnancy prior to 22 weeks gestation   • History of severe pre-eclampsia   • Previous  delivery affecting pregnancy   • Supervision of high risk pregnancy in second trimester   • Placenta previa in second trimester   • Nephrolithiasis   • Placenta previa antepartum       Start Time:   Stop Time:     Interpretation A  Nonstress Test Interpretation A: Reactive (22 : Melisa Mchguh, RN)  Comments A: Reviewed with DAYTON Hinojosa RN (22 : Melisa Mchugh, RN)

## 2022-03-04 NOTE — PLAN OF CARE
Problem: Adult Inpatient Plan of Care  Goal: Plan of Care Review  Outcome: Ongoing, Progressing  Flowsheets  Taken 3/4/2022 0323 by Melisa Mchugh, RN  Progress: improving  Outcome Summary: NST reactive. VSS. No complaints at this time. Pt resting  Taken 3/2/2022 1731 by Larisa Calvert, RN  Plan of Care Reviewed With: patient   Goal Outcome Evaluation:           Progress: improving  Outcome Summary: NST reactive. VSS. No complaints at this time. Pt resting

## 2022-03-04 NOTE — PAYOR COMM NOTE
"    Ayde Joe RN [facility]  [phone]     Phone  [phone]      Fax  Cont. Stay Review      Zahra Zuleta (36 y.o. Female)             Date of Birth Social Security Number Address Home Phone MRN    1985  [address]  [place] [address] [phone] [id number]    [employer] Marital Status             None        Admission Date Admission Type Admitting Provider Attending Provider Department, Room/Bed    1/22/22 Elective Noy Parra DO Smith, Amelia, DO [facility], M[facility]/1    Discharge Date Discharge Disposition Discharge Destination                         Attending Provider: Noy Parra DO    Allergies: No Known Allergies    Isolation: None   Infection: None   Code Status: CPR   Advance Care Planning Activity    Ht: 160 cm (63\")   Wt: 69.9 kg (154 lb)    Admission Cmt: None   Principal Problem: None                Active Insurance as of 1/22/2022     Primary Coverage     Payor Plan Insurance Group Employer/Plan Group    [employer]UE [employer]OSS [facility] [employer] [facility] PPO [id number]     Payor Plan Address Payor Plan Phone Number Payor Plan Fax Number Effective Dates    [address] [phone]  8/17/2019 - None Entered    [place] [address]       Subscriber Name Subscriber Birth Date Member ID       VINAY ZULETA 11/30/1983 [id number]                 Emergency Contacts      (Rel.) Home Phone Work Phone Mobile Phone    Vinay Zuleta (Spouse) [phone] -- [phone]            Vital Signs (last day)     Date/Time Temp Temp src Pulse Resp BP Patient Position SpO2    03/04/22 0937 98.4 (36.9) Oral 107 16 112/67 Lying 97    03/04/22 0625 98.1 (36.7) Oral 76 16 107/63 Lying 98    03/03/22 2024 98.1 (36.7) Oral 83 16 119/70 Lying 99    03/03/22 1834 98.3 (36.8) Oral 101 18 128/68 Lying 100    03/03/22 1400 -- -- 93 18 114/72 Lying --    03/03/22 0946 97.7 (36.5) Oral 86 18 102/56 " Lying 97    03/03/22 0616 98.9 (37.2) Oral 79 16 113/57 Lying 97          Oxygen Therapy (last day)     Date/Time SpO2 Device (Oxygen Therapy) Flow (L/min) Oxygen Concentration (%) ETCO2 (mmHg)    03/04/22 0937 97 room air -- -- --    03/04/22 0625 98 room air -- -- --    03/03/22 2024 99 room air -- -- --    03/03/22 1834 100 room air -- -- --    03/03/22 0946 97 room air -- -- --    03/03/22 0616 97 room air -- -- --          Current Facility-Administered Medications   Medication Dose Route Frequency Provider Last Rate Last Admin   • acetaminophen (TYLENOL) tablet 1,000 mg  1,000 mg Oral Q6H PRN Noy Parra DO   1,000 mg at 02/03/22 1531   • diphenhydrAMINE (BENADRYL) capsule 25 mg  25 mg Oral Q6H PRN Alysha Curran MD   25 mg at 02/02/22 1501   • diphenhydrAMINE (BENADRYL) capsule 25 mg  25 mg Oral Nightly PRN Noy Parra DO       • docusate sodium (COLACE) capsule 100 mg  100 mg Oral BID Mick Parra MD   100 mg at 02/28/22 0755   • ferrous sulfate EC tablet 324 mg  324 mg Oral Daily With Breakfast Mick Parra MD   324 mg at 03/04/22 0940   • hydrOXYzine pamoate (VISTARIL) capsule 25 mg  25 mg Oral Q8H PRN Zain Chopra MD   25 mg at 02/21/22 2303   • ondansetron (ZOFRAN) tablet 4 mg  4 mg Oral Q8H PRN Noy Parra DO        Or   • ondansetron (ZOFRAN) injection 4 mg  4 mg Intravenous Q8H PRN Noy Parra DO       • prenatal vitamin tablet 1 tablet  1 tablet Oral Daily Alysha Curran MD   1 tablet at 03/04/22 0940   • sodium chloride 0.9 % flush 10 mL  10 mL Intravenous PRN Noy Parra DO       • sodium chloride nasal spray 2 spray  2 spray Each Nare PRN Alysha Curran Jenni, MD       • valACYclovir (VALTREX) tablet 500 mg  500 mg Oral Q12H Noy Parra DO   500 mg at 03/04/22 0939   • vitamin B-6 (PYRIDOXINE) tablet 25 mg  25 mg Oral Nightly PRNoy Varela DO         Physician Progress Notes (last 24 hours)  Notes from 03/03/22  1124 through 03/04/22 1124   No notes of this type exist for this encounter.         Medical Student Notes (last 24 hours)  Notes from 03/03/22 1124 through 03/04/22 1124   No notes of this type exist for this encounter.         Consult Notes (last 24 hours)  Notes from 03/03/22 1124 through 03/04/22 1124   No notes of this type exist for this encounter.       Shared                Show:Clear all  [x]Manual[x]Template[x]Copied    Added by:  [x]Guzman Dunaway, Medical Student      []Cristine for details    [facility]  Progress Note - Antepartum     Name: Zahra Gasca  MRN: [id number]  [place] [facility] [place]   Date: 3/3/2022  CSN: [id number]      HD #38 - Admitted at 30w0d for recurrent vaginal bleeding in the context of placenta previa and FGR.     SUBJECTIVE  Patient seen and examined.  Patient has no complaints.  Denies any contractions, LOF, or VB.  Reports good FM.  Denies any fevers, chills, chest pain, SOB, abdominal pain, or calf pain.     OBJECTIVE  /57 (BP Location: Left arm, Patient Position: Lying)   Pulse 79   Temp 98.9 °F (37.2 °C) (Oral)   Resp 16   Wt 69.9 kg (154 lb)   LMP 06/26/2021 (Approximate)   SpO2 97%   Breastfeeding No   BMI 27.28 kg/m²   Gen: NAD, AAO x3, pleasant  CV: RRR, no murmurs/rubs/gallops  Lungs: CTAB, no wheezes/rhonchi/rales  Abd: Soft, gravid, uterus nontender  Ext: No edema        IMAGING  1/22/2022: cephalic, posterior placenta marginal previa, MIKIE 12.04 cm, EFW 9%ile, AC 18%ile  1/23/2022: BPP 8/8, UAD elevated  1/27/2022: BPP 8/8, UAD WNL  2/1/2022: BPP 8/8, UAD WNL, low-lying posterior placenta, cephalic  2/3/2022: BPP 6/8, UAD WNL, placenta previa, cephalic  2/7/2022: BPP 8/8, EFW 1417g (<3%ile) w/ AC <3%ile, UAD WNL  2/10/2022: BPP 8/8, UAD WNL  2/14/2022: BPP 8/8, UAD borderline (S/D 3.5)  2/16/2022: BPP 8/8, UAD borderline (S/D 3.3, 3.7)  2/21/2022: BPP 8/8, UAD WNL  2/23/2022: BPP 8/8, UAD WNL  2/28/2022: EFW 2053g (4%ile) w/ AC 6%ile, BPP  6/8, MIKIE 11.3 cm, UAD borderline (S/D 3.3, 4.1)  3/2/2022: BPP 8/8, UAD WNL     ASSESSMENT  Zahra Gasca is 36 y.o.  at 35w5d admitted for significant repeat bleeding with known placenta previa.  Hemodynamically stable with no evidence of ongoing active bleed.  Obstetrically stable with reassuring fetal status with FGR.     PLAN  1.  Recurrent vaginal bleeding with known placenta previa, stable  - NST BID or PRN  - s/p BMZ benefit  (2nd course)  - Disposition: Inpatient management until delivery via CS at 36 weeks unless sooner for worsening bleeding or other indications.  Inpatient admission required given risks of catastrophic outcomes including maternal and/or fetal death should she go home and have a catastrophic bleed.  - Delivery scheduled for 3/7 at 36w2d as when she falls at 36 weeks, it will be on the weekend.  Given the limited resources available on the weekend, joint decision made with the patient and OB staff to proceed with delivery during weekday.      2.  Severe FGR <3%ile  - TORCH titers ordered  - NIPT low risk  - BPP w/ UAD 2x/week (since severe IUGR)     3.  GDMA1  - Fasting and 2h PP (started checking 2h PP previously)  - BS within goal or near goal -- Glc 107 yesterday     4.  History of preeclampsia with severe features  - Monitor BP     5.  Prior  section x1  - Reviewed 11% risk of accreta  - Plan for RLTCS especially given placenta previa  - Declines permanent sterilization     7.  Rh negative  - s/p RhoGAM on        This document has been electronically signed by Guzman Dunaway, Medical Student on March 3, 2022 06:26 CST.                     Medical Student   Obstetrics   Progress Notes      Shared   Date of Service:  22   Creation Time:  22              Shared                Show:Clear all  [x]Manual[x]Template[x]Copied    Added by:  [x]Guzman Dunaway, Medical Student      []Cristine for details    [facility]  Progress Note -  Antepartum     Name: Zahra Gasca  MRN: [id number]  [place] [facility] [place] 3/4/2022  CSN: [id number]      HD #39 - Admitted at 30w0d for recurrent vaginal bleeding with known placenta previa and FGR.      SUBJECTIVE  Patient seen and examined.  Patient has no complaints.  Denies any contractions, LOF, or VB.  Reports good FM.  Denies any fevers, chills, chest pain, SOB, abdominal pain, or calf pain.     OBJECTIVE  /70 (BP Location: Left arm, Patient Position: Lying)   Pulse 83   Temp 98.1 °F (36.7 °C) (Oral)   Resp 16   Wt 69.9 kg (154 lb)   LMP 2021 (Approximate)   SpO2 99%   Breastfeeding No   BMI 27.28 kg/m²   Gen: NAD, AAO x3, pleasant  CV: RRR, no murmurs/rubs/gallops  Lungs: CTAB, no wheezes/rhonchi/rales  Abd: Soft, gravid, uterus nontender  Ext: No edema        IMAGING  2022: cephalic, posterior placenta marginal previa, MIKIE 12.04 cm, EFW 9%ile, AC 18%ile  2022: BPP 8/8, UAD elevated  2022: BPP 8/8, UAD WNL  2022: BPP 8/8, UAD WNL, low-lying posterior placenta, cephalic  2/3/2022: BPP 6/8, UAD WNL, placenta previa, cephalic  2022: BPP 8/8, EFW 1417g (<3%ile) w/ AC <3%ile, UAD WNL  2/10/2022: BPP 8/8, UAD WNL  2022: BPP 8/8, UAD borderline (S/D 3.5)  2022: BPP 8/8, UAD borderline (S/D 3.3, 3.7)  2022: BPP 8/8, UAD WNL  2022: BPP 8/8, UAD WNL  2022: EFW 2053g (4%ile) w/ AC 6%ile, BPP 6/8, MIKIE 11.3 cm, UAD borderline (S/D 3.3, 4.1)  3/2/2022: BPP 8/8, UAD WNL     ASSESSMENT  Zahra Gasca is 36 y.o.  at 35w6d admitted for significant repeat bleeding in the context of placenta previa.  Hemodynamically stable with no evidence of ongoing active bleed.  Obstetrically stable with reassuring fetal status with FGR.     PLAN  1.  Recurrent vaginal bleeding with known placenta previa, stable  - NST BID or PRN  - s/p BMZ benefit  (2nd course)  - Disposition: Inpatient management until delivery via CS at 36 weeks unless sooner  for worsening bleeding or other indications.  Inpatient admission required given risks of catastrophic outcomes including maternal and/or fetal death should she go home and have a catastrophic bleed.  - Delivery scheduled for 3/7 at 36w2d as when she falls at 36 weeks, it will be on the weekend.  Given the limited resources available on the weekend, joint decision made with the patient and OB staff to proceed with delivery during weekday.      2.  Severe FGR <3%ile  - TORCH titers ordered -- Positive for HSV 1 IgG  - NIPT low risk  - BPP w/ UAD 2x/week (since severe IUGR)     3.  GDMA1  - Fasting and 2h PP (started checking 2h PP previously)  - BS within goal or near goal -- Glc 114 yesterday     4.  History of preeclampsia with severe features  - Monitor BP     5.  Prior  section x1  - Reviewed 11% risk of accreta  - Plan for RLTCS especially given placenta previa  - Declines permanent sterilization     7.  Rh negative  - s/p RhoGAM on      This document has been electronically signed by Guzman Dunaway, Medical Student on 2022 06:22 CST.

## 2022-03-05 ENCOUNTER — ANESTHESIA EVENT (OUTPATIENT)
Dept: LABOR AND DELIVERY | Facility: HOSPITAL | Age: 37
End: 2022-03-05

## 2022-03-05 LAB
GLUCOSE BLDC GLUCOMTR-MCNC: 106 MG/DL (ref 70–130)
GLUCOSE BLDC GLUCOMTR-MCNC: 110 MG/DL (ref 70–130)
GLUCOSE BLDC GLUCOMTR-MCNC: 122 MG/DL (ref 70–130)

## 2022-03-05 PROCEDURE — 59025 FETAL NON-STRESS TEST: CPT | Performed by: STUDENT IN AN ORGANIZED HEALTH CARE EDUCATION/TRAINING PROGRAM

## 2022-03-05 PROCEDURE — 82962 GLUCOSE BLOOD TEST: CPT

## 2022-03-05 PROCEDURE — 99232 SBSQ HOSP IP/OBS MODERATE 35: CPT | Performed by: STUDENT IN AN ORGANIZED HEALTH CARE EDUCATION/TRAINING PROGRAM

## 2022-03-05 RX ADMIN — VALACYCLOVIR HYDROCHLORIDE 500 MG: 500 TABLET, FILM COATED ORAL at 21:02

## 2022-03-05 RX ADMIN — VALACYCLOVIR HYDROCHLORIDE 500 MG: 500 TABLET, FILM COATED ORAL at 08:55

## 2022-03-05 RX ADMIN — FERROUS SULFATE TAB EC 324 MG (65 MG FE EQUIVALENT) 324 MG: 324 (65 FE) TABLET DELAYED RESPONSE at 08:55

## 2022-03-05 RX ADMIN — Medication 1 TABLET: at 08:55

## 2022-03-05 NOTE — NON STRESS TEST
Zahra Gasca, a  at 35w6d with an ALEXEY of 2022, by Last Menstrual Period, was seen at [facility] for a nonstress test.    Chief Complaint   Patient presents with   • Vaginal Bleeding     1215   • other     reports fetal movement       Patient Active Problem List   Diagnosis   • Anxiety state   • Multigravida of advanced maternal age in second trimester   • Anxiety during pregnancy   • Nausea and vomiting during pregnancy prior to 22 weeks gestation   • History of severe pre-eclampsia   • Previous  delivery affecting pregnancy   • Supervision of high risk pregnancy in second trimester   • Placenta previa in second trimester   • Nephrolithiasis   • Placenta previa antepartum       Start Time:   Stop Time:          Scheduled NST

## 2022-03-05 NOTE — PLAN OF CARE
Problem: Adult Inpatient Plan of Care  Goal: Plan of Care Review  Outcome: Ongoing, Progressing  Flowsheets (Taken 3/5/2022 0432)  Progress: improving  Plan of Care Reviewed With: patient  Outcome Evaluation: vss, up ad kamilah, NST every shift reactive, reports positive fetal movement, denies vision changes, leaking/gush of fluid, or vaginal bleeding   Goal Outcome Evaluation:  Plan of Care Reviewed With: patient        Progress: improving  Outcome Evaluation: vss, up ad kamilah, NST every shift reactive, reports positive fetal movement, denies vision changes, leaking/gush of fluid, or vaginal bleeding

## 2022-03-05 NOTE — PLAN OF CARE
Problem: Adult Inpatient Plan of Care  Goal: Plan of Care Review  Outcome: Ongoing, Progressing  Flowsheets (Taken 3/5/2022 2222)  Progress: no change  Plan of Care Reviewed With: patient  Outcome Evaluation: VSS, denies pain, no vaginal bleeding, reactive NST today,   Goal Outcome Evaluation:  Plan of Care Reviewed With: patient        Progress: no change  Outcome Evaluation: VSS, denies pain, no vaginal bleeding, reactive NST today,

## 2022-03-05 NOTE — PLAN OF CARE
Goal Outcome Evaluation:  Plan of Care Reviewed With: patient        Progress: improving  Outcome Summary: vss, denies vaginal bleeding or gush of fluid, reports positive fetal movement, ambulating in room, showered, visit with family NST reactive

## 2022-03-05 NOTE — NON STRESS TEST
Zahra Gasca, a  at 36w0d with an ALEXEY of 2022, by Last Menstrual Period, was seen at [facility] for a nonstress test.    Chief Complaint   Patient presents with   • Vaginal Bleeding     1215   • other     reports fetal movement       Patient Active Problem List   Diagnosis   • Anxiety state   • Multigravida of advanced maternal age in second trimester   • Anxiety during pregnancy   • Nausea and vomiting during pregnancy prior to 22 weeks gestation   • History of severe pre-eclampsia   • Previous  delivery affecting pregnancy   • Supervision of high risk pregnancy in second trimester   • Placenta previa in second trimester   • Nephrolithiasis   • Placenta previa antepartum       Start Time: 908  Stop Time: 938    Interpretation A  Nonstress Test Interpretation A: Reactive (22 1053 : Khloe Oden, RN)  Comments A: reviewed with OLIVA Pedroza RN (22 1053 : Khloe Oden, RN)

## 2022-03-05 NOTE — PROGRESS NOTES
[facility]  Progress Note - Antepartum    Name: Zahra Gasca  MRN: [id number]  Location: [facility]   Date: 3/5/2022  CSN: [id number]     HD #39 - Admitted at 30w0d for recurrent vaginal bleeding in the context of placenta previa and FGR.    SUBJECTIVE  Patient seen and examined.  Patient has no complaints.  Denies any contractions, LOF, or VB.  Reports good FM.  Denies any fevers, chills, chest pain, SOB, abdominal pain, or calf pain. Excited to spend time with her  and daughter today.    OBJECTIVE  /59 (BP Location: Left arm, Patient Position: Lying)   Pulse 78   Temp 98.1 °F (36.7 °C) (Oral)   Resp 18   Wt 69.8 kg (153 lb 12.8 oz)   LMP 2021 (Approximate)   SpO2 98%   Breastfeeding No   BMI 27.24 kg/m²   Gen: NAD, AAO x3, pleasant  CV: RRR, no murmurs/rubs/gallops  Lungs: CTAB, no increased work of breathing  Abd: Soft, gravid, uterus nontender  Ext: No edema    NST 1557 3/ 135, +accels, no decels, mod variability, no ctx; AM pending    LABS  Labs reviewed as indicated below:  Glucose see below    IMAGING  2022: cephalic, posterior placenta marginal previa, MIKIE 12.04 cm, EFW 9%ile, AC 18%ile  2022: BPP 8/8, UAD elevated  2022: BPP 8/8, UAD WNL  2022: BPP 8/8, UAD WNL, low-lying posterior placenta, cephalic  2/3/2022: BPP 6/8, UAD WNL, placenta previa, cephalic  2022: BPP 8/8, EFW 1417g (<3%ile) w/ AC <3%ile, UAD WNL  2/10/2022: BPP 8/8, UAD WNL  2022: BPP 8/8, UAD borderline (S/D 3.5)  2022: BPP 8/8, UAD borderline (S/D 3.3, 3.7)  2022: BPP 8/8, UAD WNL  2022: BPP 8/8, UAD WNL  2022: EFW 2053g (4%ile) w/ AC 6%ile, BPP 6/8, MIKIE 11.3 cm, UAD borderline (S/D 3.3, 4.1)  3/2/2022: BPP 8/8, UAD WNL    ASSESSMENT  Zahra Gasca is 36 y.o.  at 36w0d admitted for significant repeat bleeding with known placenta previa.  Hemodynamically stable with no evidence of ongoing active bleed.  Obstetrically stable with  reassuring fetal status with FGR.     PLAN  1.  Recurrent vaginal bleeding with known placenta previa, stable  - NST BID or PRN  - s/p BMZ benefit  (2nd course)  - Disposition: Inpatient management until delivery via CS at 36 weeks unless sooner for worsening bleeding or other indications.  Inpatient admission required given risks of catastrophic outcomes including maternal and/or fetal death should she go home and have a catastrophic bleed.  - Delivery scheduled for 3/7 at 36w2d as when she falls at 36 weeks, it will be on the weekend.  Given the limited resources available on the weekend, joint decision made with the patient and OB staff to proceed with delivery during weekday.      2.  Severe FGR <3%ile  - TORCH titers ordered  - NIPT low risk  - BPP w/ UAD 2x/week (since severe IUGR)     3.  GDMA1  - Fasting and 2h PP   - BS within goal or near goal -- Glc yesterday 82/137/91/123, AM fasting 106     4.  History of preeclampsia with severe features  - Monitor BP     5.  Prior  section x1  - Reviewed 11% risk of accreta  - Plan for RLTCS especially given placenta previa  - Declines permanent sterilization     7.  Rh negative  - s/p RhoGAM on            This document has been electronically signed by Noy Parra DO on 2022 07:18 CST

## 2022-03-06 LAB
GLUCOSE BLDC GLUCOMTR-MCNC: 103 MG/DL (ref 70–130)
GLUCOSE BLDC GLUCOMTR-MCNC: 105 MG/DL (ref 70–130)
GLUCOSE BLDC GLUCOMTR-MCNC: 143 MG/DL (ref 70–130)
GLUCOSE BLDC GLUCOMTR-MCNC: 82 MG/DL (ref 70–130)

## 2022-03-06 PROCEDURE — 99232 SBSQ HOSP IP/OBS MODERATE 35: CPT | Performed by: STUDENT IN AN ORGANIZED HEALTH CARE EDUCATION/TRAINING PROGRAM

## 2022-03-06 PROCEDURE — 82962 GLUCOSE BLOOD TEST: CPT

## 2022-03-06 PROCEDURE — 59025 FETAL NON-STRESS TEST: CPT | Performed by: STUDENT IN AN ORGANIZED HEALTH CARE EDUCATION/TRAINING PROGRAM

## 2022-03-06 PROCEDURE — 59025 FETAL NON-STRESS TEST: CPT

## 2022-03-06 RX ORDER — SODIUM CHLORIDE 0.9 % (FLUSH) 0.9 %
10 SYRINGE (ML) INJECTION EVERY 12 HOURS SCHEDULED
Status: DISCONTINUED | OUTPATIENT
Start: 2022-03-06 | End: 2022-03-07

## 2022-03-06 RX ORDER — SODIUM CHLORIDE 0.9 % (FLUSH) 0.9 %
10 SYRINGE (ML) INJECTION AS NEEDED
Status: DISCONTINUED | OUTPATIENT
Start: 2022-03-06 | End: 2022-03-07

## 2022-03-06 RX ADMIN — Medication 1 TABLET: at 09:18

## 2022-03-06 RX ADMIN — VALACYCLOVIR HYDROCHLORIDE 500 MG: 500 TABLET, FILM COATED ORAL at 09:19

## 2022-03-06 RX ADMIN — VALACYCLOVIR HYDROCHLORIDE 500 MG: 500 TABLET, FILM COATED ORAL at 21:10

## 2022-03-06 RX ADMIN — FERROUS SULFATE TAB EC 324 MG (65 MG FE EQUIVALENT) 324 MG: 324 (65 FE) TABLET DELAYED RESPONSE at 09:18

## 2022-03-06 NOTE — PLAN OF CARE
Goal Outcome Evaluation:           Progress: no change  Outcome Evaluation: Vss, no pain, reactive NST

## 2022-03-06 NOTE — PROGRESS NOTES
[facility]  Progress Note - Antepartum    Name: Zahra Gasca  MRN: [id number]  Location: M7/   Date: 3/6/2022  CSN: [id number]     HD #40 - Admitted at 30w0d for recurrent vaginal bleeding in the context of placenta previa and FGR.    SUBJECTIVE  Patient seen and examined.  Patient has no complaints.  Denies any contractions, LOF, or VB.  Reports good FM.  Denies any fevers, chills, chest pain, SOB, abdominal pain, or calf pain. Feeling a little right hip pain this morning, thinks it is how she slept. Mild nausea last night but improved this morning.    OBJECTIVE  /65 (BP Location: Right arm, Patient Position: Sitting)   Pulse 88   Temp 98.1 °F (36.7 °C) (Oral)   Resp 18   Wt 69.8 kg (153 lb 12.8 oz)   LMP 2021 (Approximate)   SpO2 97%   Breastfeeding No   BMI 27.24 kg/m²   Gen: NAD, AAO x3, pleasant  CV: RRR  Lungs: CTAB, no increased work of breathing  Abd: Soft, gravid, uterus nontender  Ext: No edema    2220, AM pendin's, +accels, no decels, mod variability, no ctx on toco    LABS  Labs reviewed as indicated below:  Ordered CBC and T&S for tomorrow pre-op    IMAGING  2022: cephalic, posterior placenta marginal previa, MIKIE 12.04 cm, EFW 9%ile, AC 18%ile  2022: BPP 8/8, UAD elevated  2022: BPP 8/8, UAD WNL  2022: BPP 8/8, UAD WNL, low-lying posterior placenta, cephalic  2/3/2022: BPP 6/8, UAD WNL, placenta previa, cephalic  2022: BPP 8/8, EFW 1417g (<3%ile) w/ AC <3%ile, UAD WNL  2/10/2022: BPP 8/8, UAD WNL  2022: BPP 8/8, UAD borderline (S/D 3.5)  2022: BPP 8/8, UAD borderline (S/D 3.3, 3.7)  2022: BPP 8/8, UAD WNL  2022: BPP 8/8, UAD WNL  2022: EFW 2053g (4%ile) w/ AC 6%ile, BPP 6/8, MIKIE 11.3 cm, UAD borderline (S/D 3.3, 4.1)  3/2/2022: BPP 8/8, UAD WNL    ASSESSMENT  Zahra Gasca is 36 y.o.  at 36w1d admitted for significant repeat bleeding with known placenta previa.  Hemodynamically stable with  no evidence of ongoing active bleed.  Obstetrically stable with reassuring fetal status with FGR.     PLAN  1.  Recurrent vaginal bleeding with known placenta previa, stable  - NST BID or PRN  - s/p BMZ benefit  (2nd course)  - Disposition: Inpatient management until delivery via CS at 36 weeks unless sooner for worsening bleeding or other indications.  Inpatient admission required given risks of catastrophic outcomes including maternal and/or fetal death should she go home and have a catastrophic bleed.  - Delivery scheduled for 3/7 at 36w2d as when she falls at 36 weeks, it will be on the weekend.  Given the limited resources available on the weekend, joint decision made with the patient and OB staff to proceed with delivery during weekday.      2.  Severe FGR <3%ile  - TORCH titers ordered  - NIPT low risk  - BPP w/ UAD 2x/week (since severe IUGR)     3.  GDMA1  - Fasting and 2h PP   - BS within goal or near goal -- Glc yesterday 106/122/110; AM fasting 82     4.  History of preeclampsia with severe features  - Monitor BP     5.  Prior  section x1  - Reviewed 11% risk of accreta  - Plan for RLTCS especially given placenta previa  - Declines permanent sterilization     7.  Rh negative  - s/p RhoGAM on         This document has been electronically signed by Noy Parra DO on 2022 08:00 CST

## 2022-03-06 NOTE — NON STRESS TEST
Zahra Gasca, a  at 36w0d with an ALEEXY of 2022, by Last Menstrual Period, was seen at [facility] for a nonstress test.    Chief Complaint   Patient presents with   • Vaginal Bleeding     1215   • other     reports fetal movement       Patient Active Problem List   Diagnosis   • Anxiety state   • Multigravida of advanced maternal age in second trimester   • Anxiety during pregnancy   • Nausea and vomiting during pregnancy prior to 22 weeks gestation   • History of severe pre-eclampsia   • Previous  delivery affecting pregnancy   • Supervision of high risk pregnancy in second trimester   • Placenta previa in second trimester   • Nephrolithiasis   • Placenta previa antepartum       Start Time:   Stop Time:     Interpretation A  Nonstress Test Interpretation A: Reactive (22 : Mirna Live, RN)  Comments A: Reviewed with Tyra COMBS (22 : Mirna Live, RN)

## 2022-03-06 NOTE — NON STRESS TEST
Zahra Gasca, a  at 36w1d with an ALEXEY of 2022, by Last Menstrual Period, was seen at [facility] for a nonstress test.    Chief Complaint   Patient presents with   • Vaginal Bleeding     1215   • other     reports fetal movement       Patient Active Problem List   Diagnosis   • Anxiety state   • Multigravida of advanced maternal age in second trimester   • Anxiety during pregnancy   • Nausea and vomiting during pregnancy prior to 22 weeks gestation   • History of severe pre-eclampsia   • Previous  delivery affecting pregnancy   • Supervision of high risk pregnancy in second trimester   • Placenta previa in second trimester   • Nephrolithiasis   • Placenta previa antepartum       Start Time: 924  Stop Time:944    Interpretation A  Nonstress Test Interpretation A: Reactive (22 : Kerry Nielson, RN)  Comments A: reviewed with ALIRIO Rizvi RN (22 : Kerry Nielson, RN)      Denies bleeding

## 2022-03-06 NOTE — PLAN OF CARE
Goal Outcome Evaluation:  Plan of Care Reviewed With: patient        Progress: no change  Outcome Evaluation: vss, no pain or bleeding, reactive nst, c/s scheduled for tomorrow am, bs monitored

## 2022-03-07 ENCOUNTER — ANESTHESIA (OUTPATIENT)
Dept: LABOR AND DELIVERY | Facility: HOSPITAL | Age: 37
End: 2022-03-07

## 2022-03-07 PROBLEM — O44.03 PLACENTA PREVIA IN THIRD TRIMESTER: Status: ACTIVE | Noted: 2021-11-17

## 2022-03-07 PROBLEM — O09.93 SUPERVISION OF HIGH RISK PREGNANCY IN THIRD TRIMESTER: Status: ACTIVE | Noted: 2021-09-14

## 2022-03-07 PROBLEM — O09.523 MULTIGRAVIDA OF ADVANCED MATERNAL AGE IN THIRD TRIMESTER: Status: ACTIVE | Noted: 2021-08-21

## 2022-03-07 LAB
ABO GROUP BLD: NORMAL
AMPHET+METHAMPHET UR QL: NEGATIVE
AMPHETAMINES UR QL: NEGATIVE
BARBITURATES UR QL SCN: NEGATIVE
BASOPHILS # BLD AUTO: 0.02 10*3/MM3 (ref 0–0.2)
BASOPHILS NFR BLD AUTO: 0.2 % (ref 0–1.5)
BENZODIAZ UR QL SCN: NEGATIVE
BLD GP AB SCN SERPL QL: POSITIVE
BUPRENORPHINE SERPL-MCNC: NEGATIVE NG/ML
CANNABINOIDS SERPL QL: NEGATIVE
COCAINE UR QL: NEGATIVE
DEPRECATED RDW RBC AUTO: 48.2 FL (ref 37–54)
EOSINOPHIL # BLD AUTO: 0.09 10*3/MM3 (ref 0–0.4)
EOSINOPHIL NFR BLD AUTO: 1.1 % (ref 0.3–6.2)
ERYTHROCYTE [DISTWIDTH] IN BLOOD BY AUTOMATED COUNT: 16.8 % (ref 12.3–15.4)
HCT VFR BLD AUTO: 33.7 % (ref 34–46.6)
HGB BLD-MCNC: 10.9 G/DL (ref 12–15.9)
HOLD SPECIMEN: NORMAL
IMM GRANULOCYTES # BLD AUTO: 0.04 10*3/MM3 (ref 0–0.05)
IMM GRANULOCYTES NFR BLD AUTO: 0.5 % (ref 0–0.5)
LYMPHOCYTES # BLD AUTO: 1.61 10*3/MM3 (ref 0.7–3.1)
LYMPHOCYTES NFR BLD AUTO: 19.1 % (ref 19.6–45.3)
Lab: NORMAL
MCH RBC QN AUTO: 26.2 PG (ref 26.6–33)
MCHC RBC AUTO-ENTMCNC: 32.3 G/DL (ref 31.5–35.7)
MCV RBC AUTO: 81 FL (ref 79–97)
METHADONE UR QL SCN: NEGATIVE
MONOCYTES # BLD AUTO: 0.49 10*3/MM3 (ref 0.1–0.9)
MONOCYTES NFR BLD AUTO: 5.8 % (ref 5–12)
NEUTROPHILS NFR BLD AUTO: 6.2 10*3/MM3 (ref 1.7–7)
NEUTROPHILS NFR BLD AUTO: 73.3 % (ref 42.7–76)
NRBC BLD AUTO-RTO: 0 /100 WBC (ref 0–0.2)
OPIATES UR QL: NEGATIVE
OXYCODONE UR QL SCN: NEGATIVE
PCP UR QL SCN: NEGATIVE
PLATELET # BLD AUTO: 168 10*3/MM3 (ref 140–450)
PMV BLD AUTO: 10.7 FL (ref 6–12)
PROPOXYPH UR QL: NEGATIVE
RBC # BLD AUTO: 4.16 10*6/MM3 (ref 3.77–5.28)
RH BLD: NEGATIVE
T&S EXPIRATION DATE: NORMAL
TRICYCLICS UR QL SCN: NEGATIVE
WBC NRBC COR # BLD: 8.45 10*3/MM3 (ref 3.4–10.8)

## 2022-03-07 PROCEDURE — 25010000002 MORPHINE PER 10 MG: Performed by: NURSE ANESTHETIST, CERTIFIED REGISTERED

## 2022-03-07 PROCEDURE — 86850 RBC ANTIBODY SCREEN: CPT | Performed by: STUDENT IN AN ORGANIZED HEALTH CARE EDUCATION/TRAINING PROGRAM

## 2022-03-07 PROCEDURE — 59025 FETAL NON-STRESS TEST: CPT | Performed by: OBSTETRICS & GYNECOLOGY

## 2022-03-07 PROCEDURE — 59025 FETAL NON-STRESS TEST: CPT

## 2022-03-07 PROCEDURE — 25010000002 NALOXONE PER 1 MG: Performed by: NURSE ANESTHETIST, CERTIFIED REGISTERED

## 2022-03-07 PROCEDURE — 85025 COMPLETE CBC W/AUTO DIFF WBC: CPT | Performed by: STUDENT IN AN ORGANIZED HEALTH CARE EDUCATION/TRAINING PROGRAM

## 2022-03-07 PROCEDURE — 51703 INSERT BLADDER CATH COMPLEX: CPT

## 2022-03-07 PROCEDURE — 25010000002 MIDAZOLAM PER 1 MG: Performed by: NURSE ANESTHETIST, CERTIFIED REGISTERED

## 2022-03-07 PROCEDURE — 80306 DRUG TEST PRSMV INSTRMNT: CPT | Performed by: OBSTETRICS & GYNECOLOGY

## 2022-03-07 PROCEDURE — 25010000002 DIPHENHYDRAMINE PER 50 MG: Performed by: NURSE ANESTHETIST, CERTIFIED REGISTERED

## 2022-03-07 PROCEDURE — 25010000002 KETOROLAC TROMETHAMINE PER 15 MG: Performed by: NURSE ANESTHETIST, CERTIFIED REGISTERED

## 2022-03-07 PROCEDURE — 59514 CESAREAN DELIVERY ONLY: CPT | Performed by: PHYSICIAN ASSISTANT

## 2022-03-07 PROCEDURE — 99232 SBSQ HOSP IP/OBS MODERATE 35: CPT | Performed by: OBSTETRICS & GYNECOLOGY

## 2022-03-07 PROCEDURE — 25010000002 CEFAZOLIN PER 500 MG: Performed by: OBSTETRICS & GYNECOLOGY

## 2022-03-07 PROCEDURE — 25010000002 ONDANSETRON PER 1 MG: Performed by: NURSE ANESTHETIST, CERTIFIED REGISTERED

## 2022-03-07 PROCEDURE — C1889 IMPLANT/INSERT DEVICE, NOC: HCPCS | Performed by: OBSTETRICS & GYNECOLOGY

## 2022-03-07 PROCEDURE — 94799 UNLISTED PULMONARY SVC/PX: CPT

## 2022-03-07 PROCEDURE — 94760 N-INVAS EAR/PLS OXIMETRY 1: CPT

## 2022-03-07 PROCEDURE — 25010000002 KETOROLAC TROMETHAMINE PER 15 MG: Performed by: OBSTETRICS & GYNECOLOGY

## 2022-03-07 PROCEDURE — 86900 BLOOD TYPING SEROLOGIC ABO: CPT | Performed by: STUDENT IN AN ORGANIZED HEALTH CARE EDUCATION/TRAINING PROGRAM

## 2022-03-07 PROCEDURE — 86901 BLOOD TYPING SEROLOGIC RH(D): CPT | Performed by: STUDENT IN AN ORGANIZED HEALTH CARE EDUCATION/TRAINING PROGRAM

## 2022-03-07 PROCEDURE — 59510 CESAREAN DELIVERY: CPT | Performed by: OBSTETRICS & GYNECOLOGY

## 2022-03-07 PROCEDURE — 88307 TISSUE EXAM BY PATHOLOGIST: CPT

## 2022-03-07 PROCEDURE — 25010000002 NALOXONE PER 1 MG

## 2022-03-07 DEVICE — SEAL HEMO SURG ARISTA/AH ABS/PWDR 3GM: Type: IMPLANTABLE DEVICE | Site: UTERUS | Status: FUNCTIONAL

## 2022-03-07 RX ORDER — METHYLERGONOVINE MALEATE 0.2 MG/ML
200 INJECTION INTRAVENOUS ONCE AS NEEDED
Status: DISCONTINUED | OUTPATIENT
Start: 2022-03-07 | End: 2022-03-07 | Stop reason: HOSPADM

## 2022-03-07 RX ORDER — SIMETHICONE 80 MG
80 TABLET,CHEWABLE ORAL 4 TIMES DAILY
Status: DISCONTINUED | OUTPATIENT
Start: 2022-03-07 | End: 2022-03-09 | Stop reason: HOSPADM

## 2022-03-07 RX ORDER — ONDANSETRON 2 MG/ML
4 INJECTION INTRAMUSCULAR; INTRAVENOUS ONCE AS NEEDED
Status: DISCONTINUED | OUTPATIENT
Start: 2022-03-07 | End: 2022-03-08

## 2022-03-07 RX ORDER — DIPHENHYDRAMINE HCL 25 MG
25 CAPSULE ORAL EVERY 4 HOURS PRN
Status: DISCONTINUED | OUTPATIENT
Start: 2022-03-07 | End: 2022-03-09 | Stop reason: HOSPADM

## 2022-03-07 RX ORDER — HYDROCORTISONE 25 MG/G
CREAM TOPICAL 3 TIMES DAILY PRN
Status: DISCONTINUED | OUTPATIENT
Start: 2022-03-07 | End: 2022-03-09 | Stop reason: HOSPADM

## 2022-03-07 RX ORDER — DIPHENHYDRAMINE HYDROCHLORIDE 50 MG/ML
25 INJECTION INTRAMUSCULAR; INTRAVENOUS EVERY 4 HOURS PRN
Status: DISCONTINUED | OUTPATIENT
Start: 2022-03-07 | End: 2022-03-09 | Stop reason: HOSPADM

## 2022-03-07 RX ORDER — PRENATAL VIT/IRON FUM/FOLIC AC 27MG-0.8MG
1 TABLET ORAL DAILY
Status: DISCONTINUED | OUTPATIENT
Start: 2022-03-07 | End: 2022-03-09 | Stop reason: HOSPADM

## 2022-03-07 RX ORDER — EPHEDRINE SULFATE 50 MG/ML
INJECTION, SOLUTION INTRAVENOUS AS NEEDED
Status: DISCONTINUED | OUTPATIENT
Start: 2022-03-07 | End: 2022-03-07 | Stop reason: SURG

## 2022-03-07 RX ORDER — IBUPROFEN 800 MG/1
800 TABLET ORAL EVERY 8 HOURS
Status: DISCONTINUED | OUTPATIENT
Start: 2022-03-08 | End: 2022-03-09 | Stop reason: HOSPADM

## 2022-03-07 RX ORDER — POLYETHYLENE GLYCOL 3350 17 G/17G
17 POWDER, FOR SOLUTION ORAL DAILY
Status: DISCONTINUED | OUTPATIENT
Start: 2022-03-07 | End: 2022-03-09 | Stop reason: HOSPADM

## 2022-03-07 RX ORDER — MIDAZOLAM HYDROCHLORIDE 1 MG/ML
INJECTION INTRAMUSCULAR; INTRAVENOUS AS NEEDED
Status: DISCONTINUED | OUTPATIENT
Start: 2022-03-07 | End: 2022-03-07 | Stop reason: SURG

## 2022-03-07 RX ORDER — ACETAMINOPHEN 325 MG/1
650 TABLET ORAL EVERY 4 HOURS PRN
Status: DISCONTINUED | OUTPATIENT
Start: 2022-03-07 | End: 2022-03-08

## 2022-03-07 RX ORDER — BUPIVACAINE HYDROCHLORIDE 2.5 MG/ML
INJECTION, SOLUTION EPIDURAL; INFILTRATION; INTRACAUDAL AS NEEDED
Status: DISCONTINUED | OUTPATIENT
Start: 2022-03-07 | End: 2022-03-09 | Stop reason: HOSPADM

## 2022-03-07 RX ORDER — OXYTOCIN/0.9 % SODIUM CHLORIDE 30/500 ML
PLASTIC BAG, INJECTION (ML) INTRAVENOUS
Status: COMPLETED
Start: 2022-03-07 | End: 2022-03-07

## 2022-03-07 RX ORDER — OXYCODONE HYDROCHLORIDE 10 MG/1
10 TABLET ORAL EVERY 4 HOURS PRN
Status: DISCONTINUED | OUTPATIENT
Start: 2022-03-07 | End: 2022-03-09 | Stop reason: HOSPADM

## 2022-03-07 RX ORDER — SODIUM CHLORIDE, SODIUM LACTATE, POTASSIUM CHLORIDE, CALCIUM CHLORIDE 600; 310; 30; 20 MG/100ML; MG/100ML; MG/100ML; MG/100ML
125 INJECTION, SOLUTION INTRAVENOUS CONTINUOUS
Status: DISCONTINUED | OUTPATIENT
Start: 2022-03-07 | End: 2022-03-08

## 2022-03-07 RX ORDER — ONDANSETRON 2 MG/ML
INJECTION INTRAMUSCULAR; INTRAVENOUS AS NEEDED
Status: DISCONTINUED | OUTPATIENT
Start: 2022-03-07 | End: 2022-03-07 | Stop reason: SURG

## 2022-03-07 RX ORDER — CARBOPROST TROMETHAMINE 250 UG/ML
250 INJECTION, SOLUTION INTRAMUSCULAR
Status: DISCONTINUED | OUTPATIENT
Start: 2022-03-07 | End: 2022-03-07 | Stop reason: HOSPADM

## 2022-03-07 RX ORDER — OXYTOCIN/0.9 % SODIUM CHLORIDE 30/500 ML
85 PLASTIC BAG, INJECTION (ML) INTRAVENOUS ONCE
Status: COMPLETED | OUTPATIENT
Start: 2022-03-07 | End: 2022-03-07

## 2022-03-07 RX ORDER — NALOXONE HYDROCHLORIDE 0.4 MG/ML
INJECTION, SOLUTION INTRAMUSCULAR; INTRAVENOUS; SUBCUTANEOUS
Status: COMPLETED
Start: 2022-03-07 | End: 2022-03-07

## 2022-03-07 RX ORDER — SODIUM CHLORIDE, SODIUM LACTATE, POTASSIUM CHLORIDE, CALCIUM CHLORIDE 600; 310; 30; 20 MG/100ML; MG/100ML; MG/100ML; MG/100ML
125 INJECTION, SOLUTION INTRAVENOUS CONTINUOUS
Status: DISCONTINUED | OUTPATIENT
Start: 2022-03-07 | End: 2022-03-07

## 2022-03-07 RX ORDER — FERROUS SULFATE TAB EC 324 MG (65 MG FE EQUIVALENT) 324 (65 FE) MG
324 TABLET DELAYED RESPONSE ORAL 2 TIMES DAILY WITH MEALS
Status: DISCONTINUED | OUTPATIENT
Start: 2022-03-07 | End: 2022-03-09 | Stop reason: HOSPADM

## 2022-03-07 RX ORDER — IBUPROFEN 800 MG/1
800 TABLET ORAL EVERY 8 HOURS
Status: DISCONTINUED | OUTPATIENT
Start: 2022-03-08 | End: 2022-03-07

## 2022-03-07 RX ORDER — OXYTOCIN/0.9 % SODIUM CHLORIDE 30/500 ML
85 PLASTIC BAG, INJECTION (ML) INTRAVENOUS ONCE
Status: DISCONTINUED | OUTPATIENT
Start: 2022-03-07 | End: 2022-03-08

## 2022-03-07 RX ORDER — OXYTOCIN/0.9 % SODIUM CHLORIDE 30/500 ML
650 PLASTIC BAG, INJECTION (ML) INTRAVENOUS ONCE
Status: DISCONTINUED | OUTPATIENT
Start: 2022-03-07 | End: 2022-03-08

## 2022-03-07 RX ORDER — MISOPROSTOL 200 UG/1
800 TABLET ORAL ONCE AS NEEDED
Status: DISCONTINUED | OUTPATIENT
Start: 2022-03-07 | End: 2022-03-07 | Stop reason: HOSPADM

## 2022-03-07 RX ORDER — ONDANSETRON 2 MG/ML
4 INJECTION INTRAMUSCULAR; INTRAVENOUS EVERY 6 HOURS PRN
Status: DISCONTINUED | OUTPATIENT
Start: 2022-03-07 | End: 2022-03-09 | Stop reason: HOSPADM

## 2022-03-07 RX ORDER — OXYTOCIN/0.9 % SODIUM CHLORIDE 30/500 ML
PLASTIC BAG, INJECTION (ML) INTRAVENOUS CONTINUOUS PRN
Status: DISCONTINUED | OUTPATIENT
Start: 2022-03-07 | End: 2022-03-07 | Stop reason: SURG

## 2022-03-07 RX ORDER — OXYTOCIN/0.9 % SODIUM CHLORIDE 30/500 ML
650 PLASTIC BAG, INJECTION (ML) INTRAVENOUS ONCE
Status: DISCONTINUED | OUTPATIENT
Start: 2022-03-07 | End: 2022-03-07 | Stop reason: HOSPADM

## 2022-03-07 RX ORDER — ONDANSETRON 4 MG/1
4 TABLET, FILM COATED ORAL EVERY 8 HOURS PRN
Status: DISCONTINUED | OUTPATIENT
Start: 2022-03-07 | End: 2022-03-09 | Stop reason: HOSPADM

## 2022-03-07 RX ORDER — CALCIUM CARBONATE 200(500)MG
1 TABLET,CHEWABLE ORAL EVERY 4 HOURS PRN
Status: DISCONTINUED | OUTPATIENT
Start: 2022-03-07 | End: 2022-03-09 | Stop reason: HOSPADM

## 2022-03-07 RX ORDER — BUPIVACAINE HCL/0.9 % NACL/PF 0.1 %
2 PLASTIC BAG, INJECTION (ML) EPIDURAL ONCE
Status: COMPLETED | OUTPATIENT
Start: 2022-03-07 | End: 2022-03-07

## 2022-03-07 RX ORDER — NALOXONE HYDROCHLORIDE 0.4 MG/ML
0.4 INJECTION, SOLUTION INTRAMUSCULAR; INTRAVENOUS; SUBCUTANEOUS
Status: DISCONTINUED | OUTPATIENT
Start: 2022-03-07 | End: 2022-03-09 | Stop reason: HOSPADM

## 2022-03-07 RX ORDER — KETOROLAC TROMETHAMINE 30 MG/ML
INJECTION, SOLUTION INTRAMUSCULAR; INTRAVENOUS AS NEEDED
Status: DISCONTINUED | OUTPATIENT
Start: 2022-03-07 | End: 2022-03-07 | Stop reason: SURG

## 2022-03-07 RX ORDER — BUPIVACAINE HYDROCHLORIDE 7.5 MG/ML
INJECTION, SOLUTION EPIDURAL; RETROBULBAR
Status: COMPLETED | OUTPATIENT
Start: 2022-03-07 | End: 2022-03-07

## 2022-03-07 RX ORDER — TRISODIUM CITRATE DIHYDRATE AND CITRIC ACID MONOHYDRATE 500; 334 MG/5ML; MG/5ML
30 SOLUTION ORAL ONCE
Status: COMPLETED | OUTPATIENT
Start: 2022-03-07 | End: 2022-03-07

## 2022-03-07 RX ORDER — ACETAMINOPHEN 500 MG
1000 TABLET ORAL EVERY 6 HOURS
Status: DISCONTINUED | OUTPATIENT
Start: 2022-03-07 | End: 2022-03-09 | Stop reason: HOSPADM

## 2022-03-07 RX ORDER — ALUMINA, MAGNESIA, AND SIMETHICONE 2400; 2400; 240 MG/30ML; MG/30ML; MG/30ML
15 SUSPENSION ORAL EVERY 4 HOURS PRN
Status: DISCONTINUED | OUTPATIENT
Start: 2022-03-07 | End: 2022-03-09 | Stop reason: HOSPADM

## 2022-03-07 RX ORDER — MORPHINE SULFATE 1 MG/ML
INJECTION, SOLUTION EPIDURAL; INTRATHECAL; INTRAVENOUS AS NEEDED
Status: DISCONTINUED | OUTPATIENT
Start: 2022-03-07 | End: 2022-03-07 | Stop reason: SURG

## 2022-03-07 RX ORDER — OXYCODONE HYDROCHLORIDE 5 MG/1
5 TABLET ORAL EVERY 4 HOURS PRN
Status: DISCONTINUED | OUTPATIENT
Start: 2022-03-07 | End: 2022-03-09 | Stop reason: HOSPADM

## 2022-03-07 RX ORDER — TRISODIUM CITRATE DIHYDRATE AND CITRIC ACID MONOHYDRATE 500; 334 MG/5ML; MG/5ML
SOLUTION ORAL
Status: COMPLETED
Start: 2022-03-07 | End: 2022-03-07

## 2022-03-07 RX ORDER — DOCUSATE SODIUM 100 MG/1
100 CAPSULE, LIQUID FILLED ORAL 2 TIMES DAILY
Status: DISCONTINUED | OUTPATIENT
Start: 2022-03-07 | End: 2022-03-09 | Stop reason: HOSPADM

## 2022-03-07 RX ORDER — KETOROLAC TROMETHAMINE 30 MG/ML
30 INJECTION, SOLUTION INTRAMUSCULAR; INTRAVENOUS EVERY 6 HOURS
Status: COMPLETED | OUTPATIENT
Start: 2022-03-07 | End: 2022-03-08

## 2022-03-07 RX ADMIN — SODIUM CITRATE AND CITRIC ACID MONOHYDRATE 30 ML: 500; 334 SOLUTION ORAL at 07:10

## 2022-03-07 RX ADMIN — NALOXONE HYDROCHLORIDE 0.04 MG: 0.4 INJECTION, SOLUTION INTRAMUSCULAR; INTRAVENOUS; SUBCUTANEOUS at 10:40

## 2022-03-07 RX ADMIN — ACETAMINOPHEN 1000 MG: 500 TABLET, FILM COATED ORAL at 09:15

## 2022-03-07 RX ADMIN — KETOROLAC TROMETHAMINE 30 MG: 30 INJECTION, SOLUTION INTRAMUSCULAR; INTRAVENOUS at 16:16

## 2022-03-07 RX ADMIN — KETOROLAC TROMETHAMINE 30 MG: 30 INJECTION, SOLUTION INTRAMUSCULAR; INTRAVENOUS at 21:20

## 2022-03-07 RX ADMIN — SODIUM CHLORIDE, POTASSIUM CHLORIDE, SODIUM LACTATE AND CALCIUM CHLORIDE 125 ML/HR: 600; 310; 30; 20 INJECTION, SOLUTION INTRAVENOUS at 15:35

## 2022-03-07 RX ADMIN — DIPHENHYDRAMINE HYDROCHLORIDE 25 MG: 50 INJECTION INTRAMUSCULAR; INTRAVENOUS at 11:27

## 2022-03-07 RX ADMIN — FERROUS SULFATE TAB EC 324 MG (65 MG FE EQUIVALENT) 324 MG: 324 (65 FE) TABLET DELAYED RESPONSE at 09:15

## 2022-03-07 RX ADMIN — DIPHENHYDRAMINE HYDROCHLORIDE 25 MG: 50 INJECTION INTRAMUSCULAR; INTRAVENOUS at 19:31

## 2022-03-07 RX ADMIN — PRENATAL VIT W/ FE FUMARATE-FA TAB 27-0.8 MG 1 TABLET: 27-0.8 TAB at 09:15

## 2022-03-07 RX ADMIN — ACETAMINOPHEN 1000 MG: 500 TABLET, FILM COATED ORAL at 16:17

## 2022-03-07 RX ADMIN — ONDANSETRON 4 MG: 2 INJECTION INTRAMUSCULAR; INTRAVENOUS at 08:03

## 2022-03-07 RX ADMIN — OXYTOCIN-SODIUM CHLORIDE 0.9% IV SOLN 30 UNIT/500ML 85 ML/HR: 30-0.9/5 SOLUTION at 08:32

## 2022-03-07 RX ADMIN — SODIUM CHLORIDE, POTASSIUM CHLORIDE, SODIUM LACTATE AND CALCIUM CHLORIDE 1000 ML: 600; 310; 30; 20 INJECTION, SOLUTION INTRAVENOUS at 06:30

## 2022-03-07 RX ADMIN — TRISODIUM CITRATE DIHYDRATE AND CITRIC ACID MONOHYDRATE 30 ML: 500; 334 SOLUTION ORAL at 07:10

## 2022-03-07 RX ADMIN — KETOROLAC TROMETHAMINE 30 MG: 30 INJECTION, SOLUTION INTRAMUSCULAR; INTRAVENOUS at 08:24

## 2022-03-07 RX ADMIN — NALOXONE HYDROCHLORIDE 0.4 MG: 0.4 INJECTION, SOLUTION INTRAMUSCULAR; INTRAVENOUS; SUBCUTANEOUS at 14:41

## 2022-03-07 RX ADMIN — BUPIVACAINE HYDROCHLORIDE 1.4 ML: 7.5 INJECTION, SOLUTION EPIDURAL; RETROBULBAR at 07:23

## 2022-03-07 RX ADMIN — Medication 2 G: at 07:25

## 2022-03-07 RX ADMIN — NALOXONE HYDROCHLORIDE 0.04 MG: 0.4 INJECTION, SOLUTION INTRAMUSCULAR; INTRAVENOUS; SUBCUTANEOUS at 09:33

## 2022-03-07 RX ADMIN — EPHEDRINE SULFATE 15 MG: 50 INJECTION INTRAVENOUS at 07:30

## 2022-03-07 RX ADMIN — Medication 0.2 MG: at 07:23

## 2022-03-07 RX ADMIN — Medication 85 ML/HR: at 08:32

## 2022-03-07 RX ADMIN — EPHEDRINE SULFATE 10 MG: 50 INJECTION INTRAVENOUS at 08:03

## 2022-03-07 RX ADMIN — SODIUM CHLORIDE, POTASSIUM CHLORIDE, SODIUM LACTATE AND CALCIUM CHLORIDE: 600; 310; 30; 20 INJECTION, SOLUTION INTRAVENOUS at 08:05

## 2022-03-07 RX ADMIN — OXYTOCIN-SODIUM CHLORIDE 0.9% IV SOLN 30 UNIT/500ML 650 ML/HR: 30-0.9/5 SOLUTION at 07:39

## 2022-03-07 RX ADMIN — SODIUM CHLORIDE, POTASSIUM CHLORIDE, SODIUM LACTATE AND CALCIUM CHLORIDE: 600; 310; 30; 20 INJECTION, SOLUTION INTRAVENOUS at 06:45

## 2022-03-07 RX ADMIN — ACETAMINOPHEN 1000 MG: 500 TABLET, FILM COATED ORAL at 22:55

## 2022-03-07 RX ADMIN — MIDAZOLAM HYDROCHLORIDE 2 MG: 1 INJECTION, SOLUTION INTRAMUSCULAR; INTRAVENOUS at 08:10

## 2022-03-07 NOTE — ANESTHESIA PREPROCEDURE EVALUATION
Anesthesia Evaluation     Patient summary reviewed and Nursing notes reviewed   no history of anesthetic complications:  NPO Solid Status: > 8 hours  NPO Liquid Status: > 8 hours           Airway   Mallampati: II  TM distance: >3 FB  Neck ROM: full  No difficulty expected  Dental - normal exam     Pulmonary - normal exam   (+) a smoker Former,   Cardiovascular - negative cardio ROS and normal exam        Neuro/Psych  (+) psychiatric history Anxiety,    GI/Hepatic/Renal/Endo - negative ROS     Musculoskeletal (-) negative ROS    Abdominal    Substance History - negative use     OB/GYN    (+) Pregnant,     Comment: Placenta Previa (complete)          Other - negative ROS                       Anesthesia Plan    ASA 3     spinal       Anesthetic plan, all risks, benefits, and alternatives have been provided, discussed and informed consent has been obtained with: patient.        CODE STATUS:    Code Status (Patient has no pulse and is not breathing): CPR (Attempt to Resuscitate)  Medical Interventions (Patient has pulse or is breathing): Full

## 2022-03-07 NOTE — OP NOTE
[facility]  Operative Report    Name: Zahra Gasca  MRN: [id number]  Date: 3/7/2022  CSN: [id number]      Location: [facility] LABOR DELIVERY    Service: Obstetrics    Pre-op Diagnosis:   1.  IUP at 36w2d  2.  Placenta previa  3.  IUGR  4.  GDMA1  5.  Prior  section x1  6.  History of pre-eclampsia (with flash pulmonary edema)    Post-op Diagnosis: Same    Surgeon: Alysha Curran MD FACOG    Assistant: Ashleigh Olivares CSA    Staff:  Circulator: Mercy Eugene RN  Scrub Person: Maira Root  L & LIANNE Nurse: Jessica Pedroza RN; Alice Montgomery RN  NICU Nurse: Onelia Luis RN  Assistant: Arlene Olivares CSA    Anesthesia: Spinal w/ Duramorph    Anesthesia Staff:  CRNA: Neris Reyes CRNA    Operation: Repeat low-transverse  section, TAP block    Drains: Azevedo catheter, draining clear yellow urine    Complications: None    Findings: Normal appearing abdomen.  Minimal fascia scarring.  Bladder scarred to lower uterine segment, although uterus small.  Delivery of viable female  weighing 2240 grams with Apgars of 9 and 9.  Bilateral fallopian tubes and ovaries normal.      Condition: Stable    Specimens/Disposition: Placenta and umbilical cord    Estimated Blood Loss: 800 mL  Quantitative Blood Loss: 788 mL  IV Fluids: 1300 mL crystalloid  Urine Output: 75 mL    Indications: Zahra Gasca, 36 y.o.,  at 36w2d with complete placenta previa requiring admission since late 2nd trimester due to multiple episodes of bleeding.      Description of Operation:  The patient was identified and the procedure verified as a  section delivery.  The patient was given spinal anesthesia.  Patient prepared and draped in normal sterile fashion in dorsal supine position with a leftward tilt.  A transverse skin incision was made with the scalpel and carried down through the subcutaneous tissue to the fascia using the Bovie.  Fascial incision was made  with the Bovie and extended transversely with Mcguire scissors.  The superior aspect of the fascia was grasped with Kocher clamps and the rectus muscle was dissected off bluntly and sharply with Mcguire scissors.  The inferior aspect of the fascia was then grasped with Kocher clamps and the rectus muscle and pyramidalis were dissected off bluntly and then sharply with Mcguire scissors.  The rectus muscle was then  in the midline and the peritoneum was identified and entered bluntly.  The peritoneal incision was extended transversely.  Bladder blade and retractor were inserted.  The uterovesical peritoneal reflection was identified and incised transversely with Metzenbaum scissors.  The bladder flap was bluntly freed from the lower uterine segment. The bladder blade was adjusted.  A low transverse uterine incision was made with a scalpel and the uterine incision was extended with upward traction.  The amniotic sac was ruptured with an Allis clamp.  Delivered from cephalic presentation was a live female  weighing 2240 grams with Apgar scores of 9 at one minute and 9 at five minutes.  Cord clamped and cut and infant with bulb suction were handed to awaiting staff.  Cord blood was obtained and sent.  The placenta was removed intact and appeared normal.  Uterus exteriorized and cleared of all clots and debris.  The uterus, tubes and ovaries appeared normal.  The uterine incision was closed with running locked sutures of 0-Vicryl and imbricated with 0-Vicryl.  Hemostasis achieved with multiple interrupted 2-0 Vicryl sutures as the lower uterine segment was quite vascular although no accreta noted.  Posterior cul-de-sac was cleared.  Uterus was reinserted atraumatically.  Hemostasis was observed.  Bilateral paracolic gutters cleared.  Inspection of the abdomen and pelvis prior to abdominal wall closure revealed no evidence of retained instruments or sponges.  Prior to closure of the abdomen, bilateral transversus  abdominus planus block was performed with 15 cc of 0.25% bupivacaine on each side under direct visualization.  Edward applied to hysterotomy.  The fascia was then reapproximated with running sutures of 0-Vicryl.  Subcutaneous layer closed with 2-0 plain gut.  The skin was reapproximated with 3-0 Monocryl in subcuticular fashion.  Exofin dressing applied.  There were no intraoperative complications and patient tolerated the procedure well.  The patient was escorted to her room in stable condition.    The patient received Ancef 2g for antibiotic prophylaxis prior to the start of the procedure.    Ashleigh Olivares CSA was responsible for performing the following activities: Retraction, Suction, Irrigation, Suturing, Closing, Placing Dressing and Delivery of Fetus and their skilled assistance was necessary for the success of this case.    This document has been electronically signed by Alysha Curran MD on March 7, 2022 08:18 CST.

## 2022-03-07 NOTE — L&D DELIVERY NOTE
[facility]   Delivery Note   Review the Delivery Report for details.       Delivery     Delivery: , Low Transverse     YOB: 2022    Time of Birth:  Gestational Age 7:37 AM   36w2d     Anesthesia: Spinal     Delivering clinician: Alysha Curran    Forceps?   No   Vacuum? No    Shoulder dystocia present: No        Delivery narrative:  See operative report    Infant    Findings: female  infant     Infant observations: Weight: 2240 g (4 lb 15 oz)   Length: 18.25  in  Observations/Comments:        Apgars: 9  @ 1 minute /    9  @ 5 minutes   Infant Name: Aleksandra Gasca     Placenta, Cord, and Fluid    Placenta delivered  Expressed  at   3/7/2022  7:38 AM     Cord: 3 vessels  present.   Nuchal Cord?  no   Cord blood obtained: Yes    Cord gases obtained:  No    Cord gas results: Venous:  No results found for: PHCVEN    Arterial:  No results found for: PHCART     Repair    Episiotomy: None    No    Lacerations: No   Estimated Blood Loss:               Quantitative Blood Loss: Quantitative Blood Loss (mL): 101 mL (22 0947)            Complications  Placenta previa  FGR  GDMA1    Disposition  Mother to Mother Baby/Postpartum in stable condition currently.  Baby to NBN in stable condition currently.    Alysha Curran MD  22  08:23 CST

## 2022-03-07 NOTE — PROGRESS NOTES
[facility]  Progress Note - Antepartum    Name: Zahra Gasca  MRN: [id number]  Location: 767/1   Date: 3/7/2022  CSN: [id number]     HD #41 - Admitted at 30w0d for recurrent vaginal bleeding in the context of placenta previa and FGR.     SUBJECTIVE  Patient seen and examined.  Patient has no complaints.  Denies any contractions, LOF, or VB.  Reports good FM.  Denies any fevers, chills, chest pain, SOB, abdominal pain, or calf pain.    OBJECTIVE  /58 (BP Location: Left arm, Patient Position: Lying)   Pulse 78   Temp 98 °F (36.7 °C) (Oral)   Resp 18   Wt 69.8 kg (153 lb 12.8 oz)   LMP 06/26/2021 (Approximate)   SpO2 98%   Breastfeeding No   BMI 27.24 kg/m²   Gen: NAD, AAO x3, pleasant  CV: RRR, no murmurs/rubs/gallops  Lungs: CTAB, no wheezes/rhonchi/rales  Abd: Soft, gravid, uterus nontender  Ext: No edema      LABS  Labs reviewed as indicated below:  WBC   Date Value Ref Range Status   03/07/2022 8.45 3.40 - 10.80 10*3/mm3 Final     RBC   Date Value Ref Range Status   03/07/2022 4.16 3.77 - 5.28 10*6/mm3 Final     Hemoglobin   Date Value Ref Range Status   03/07/2022 10.9 (L) 12.0 - 15.9 g/dL Final     Hematocrit   Date Value Ref Range Status   03/07/2022 33.7 (L) 34.0 - 46.6 % Final     MCV   Date Value Ref Range Status   03/07/2022 81.0 79.0 - 97.0 fL Final     MCH   Date Value Ref Range Status   03/07/2022 26.2 (L) 26.6 - 33.0 pg Final     MCHC   Date Value Ref Range Status   03/07/2022 32.3 31.5 - 35.7 g/dL Final     RDW   Date Value Ref Range Status   03/07/2022 16.8 (H) 12.3 - 15.4 % Final     RDW-SD   Date Value Ref Range Status   03/07/2022 48.2 37.0 - 54.0 fl Final     MPV   Date Value Ref Range Status   03/07/2022 10.7 6.0 - 12.0 fL Final     Platelets   Date Value Ref Range Status   03/07/2022 168 140 - 450 10*3/mm3 Final     Neutrophil %   Date Value Ref Range Status   03/07/2022 73.3 42.7 - 76.0 % Final     Lymphocyte %   Date Value Ref Range Status   03/07/2022  19.1 (L) 19.6 - 45.3 % Final     Monocyte %   Date Value Ref Range Status   2022 5.8 5.0 - 12.0 % Final     Eosinophil %   Date Value Ref Range Status   2022 1.1 0.3 - 6.2 % Final     Basophil %   Date Value Ref Range Status   2022 0.2 0.0 - 1.5 % Final     Immature Grans %   Date Value Ref Range Status   2022 0.5 0.0 - 0.5 % Final     Neutrophils, Absolute   Date Value Ref Range Status   2022 6.20 1.70 - 7.00 10*3/mm3 Final     Lymphocytes, Absolute   Date Value Ref Range Status   2022 1.61 0.70 - 3.10 10*3/mm3 Final     Monocytes, Absolute   Date Value Ref Range Status   2022 0.49 0.10 - 0.90 10*3/mm3 Final     Eosinophils, Absolute   Date Value Ref Range Status   2022 0.09 0.00 - 0.40 10*3/mm3 Final     Basophils, Absolute   Date Value Ref Range Status   2022 0.02 0.00 - 0.20 10*3/mm3 Final     Immature Grans, Absolute   Date Value Ref Range Status   2022 0.04 0.00 - 0.05 10*3/mm3 Final     nRBC   Date Value Ref Range Status   2022 0.0 0.0 - 0.2 /100 WBC Final         IMAGING  2022: cephalic, posterior placenta marginal previa, MIKIE 12.04 cm, EFW 9%ile, AC 18%ile  2022: BPP 8/8, UAD elevated  2022: BPP 8/8, UAD WNL  2022: BPP 8/8, UAD WNL, low-lying posterior placenta, cephalic  2/3/2022: BPP 6/8, UAD WNL, placenta previa, cephalic  2022: BPP 8/8, EFW 1417g (<3%ile) w/ AC <3%ile, UAD WNL  2/10/2022: BPP 8/8, UAD WNL  2022: BPP 8/8, UAD borderline (S/D 3.5)  2022: BPP 8/8, UAD borderline (S/D 3.3, 3.7)  2022: BPP 8/8, UAD WNL  2022: BPP 8/8, UAD WNL  2022: EFW 2053g (4%ile) w/ AC 6%ile, BPP 6/8, MIKIE 11.3 cm, UAD borderline (S/D 3.3, 4.1)  3/2/2022: BPP 8/8, UAD WNL    ASSESSMENT  Zahra Gasca is 36 y.o.  at 36w2d admitted for significant repeat bleeding with known placenta previa.  Hemodynamically stable with no evidence of ongoing active bleed.  Obstetrically stable with reassuring fetal  status with FGR..    PLAN  1.  Recurrent vaginal bleeding with known placenta previa, stable  - s/p BMZ benefit  (2nd course)  - Disposition: Inpatient management until delivery via CS at 36 weeks unless sooner for worsening bleeding or other indications.  Inpatient admission required given risks of catastrophic outcomes including maternal and/or fetal death should she go home and have a catastrophic bleed.  - Delivery scheduled for today via CS     2.  Severe FGR <3%ile  - TORCH titers ordered  - NIPT low risk  - BPP w/ UAD 2x/week (since severe IUGR)     3.  GDMA1  - Fasting and 2h PP   - BS within goal or near goal     4.  History of preeclampsia with severe features  - Monitor BP     5.  Prior  section x1  - Reviewed 11% risk of accreta  - Plan for RLTCS today especially given placenta previa  - Declines permanent sterilization     7.  Rh negative  - s/p RhoGAM on     This document has been electronically signed by Guzman Dunaway, Medical Student on 2022 06:39 CST.    Attending Addendum  Patient seen and examined this morning.  Proceed with RLTCS at 36w2d secondary to placenta previa.  Reviewed risks and benefits to include injury to surrounding organs (bowel, bladder, ureters, blood vessels, nerves, baby), infection, bleeding (possibly requiring blood transfusion and/or hysterectomy), and maternal/fetal death.    This document has been electronically signed by Alysha Curran MD on 2022 07:02 CST.

## 2022-03-07 NOTE — PAYOR COMM NOTE
"    Ayde Esparza, AMINA [facility]  [phone]     Phone  [phone]      Fax  Cont. Stay Review      Zahra Zuleta (36 y.o. Female)             Date of Birth   1985    Social Security Number       Address   [address] [place] [address]    Home Phone   [phone]    MRN   [id number]       Denominational   None    Marital Status                               Admission Date   1/22/22    Admission Type   Elective    Admitting Provider   Noy Parra DO    Attending Provider   Noy Parra DO    Department, Room/Bed   [facility], M767/1       Discharge Date       Discharge Disposition       Discharge Destination                               Attending Provider: Noy Parra DO    Allergies: No Known Allergies    Isolation: None   Infection: None   Code Status: CPR   Advance Care Planning Activity    Ht: 160 cm (63\")   Wt: 69.8 kg (153 lb 12.8 oz)    Admission Cmt: None   Principal Problem: Placenta previa antepartum [O44.00]                 Active Insurance as of 1/22/2022     Primary Coverage     Payor Plan Insurance Group Employer/Plan Group    [employer] PPO [id number]     Payor Plan Address Payor Plan Phone Number Payor Plan Fax Number Effective Dates    [id number] [phone]  8/17/2019 - None Entered    [place] [address]       Subscriber Name Subscriber Birth Date Member ID       JAMARCUS ZULETA 11/30/1983 [id number]                 Emergency Contacts      (Rel.) Home Phone Work Phone Mobile Phone    CamposJamarcus (Spouse) [phone] -- [phone]            Vital Signs (last day)     Date/Time Temp Temp src Pulse Resp BP Patient Position SpO2    03/07/22 1032 98.8 (37.1) Oral 86 -- 113/77 -- 100    03/07/22 1017 -- -- 76 -- 118/68 -- 100    03/07/22 1002 -- -- 76 -- 105/63 -- 100    03/07/22 0947 -- -- 76 -- 104/58 -- 100    03/07/22 0932 -- " -- 75 -- 112/60 -- 100    03/07/22 0917 -- -- 85 -- 112/58 -- 100    03/07/22 0907 -- -- 77 -- 105/55 -- 99    03/07/22 0847 -- -- 117 -- 125/62 -- --    03/07/22 0832 -- -- 111 18 142/84 Sitting 99    03/07/22 0500 98 (36.7) Oral 78 18 116/58 Lying 98    03/06/22 1850 98.6 (37) Oral 82 18 108/55 Lying 98    03/06/22 1806 98.3 (36.8) Oral 65 18 120/58 -- 100    03/06/22 1438 98.4 (36.9) Oral 85 18 114/62 Sitting 98    03/06/22 0935 98.4 (36.9) Oral -- -- -- -- --    03/06/22 0923 -- -- 75 16 109/57 -- --          Oxygen Therapy (last day)     Date/Time SpO2 Device (Oxygen Therapy) Flow (L/min) Oxygen Concentration (%) ETCO2 (mmHg)    03/07/22 1032 100 -- -- -- --    03/07/22 1017 100 -- -- -- --    03/07/22 1002 100 -- -- -- --    03/07/22 0947 100 -- -- -- --    03/07/22 0932 100 -- -- -- --    03/07/22 0917 100 -- -- -- --    03/07/22 0907 99 -- -- -- --    03/07/22 0832 99 room air -- -- --    03/07/22 0500 98 -- -- -- --    03/06/22 1850 98 -- -- -- --    03/06/22 1806 100 room air -- -- --    03/06/22 1438 98 room air -- -- --          Current Facility-Administered Medications   Medication Dose Route Frequency Provider Last Rate Last Admin   • acetaminophen (TYLENOL) tablet 1,000 mg  1,000 mg Oral Q6H Alysha Curran MD   1,000 mg at 03/07/22 0915   • bupivacaine (PF) (MARCAINE) 0.25 % injection    PRN Alysha Curran MD   30 mL at 03/07/22 0803   • ferrous sulfate EC tablet 324 mg  324 mg Oral BID With Meals Alysha Curran MD   324 mg at 03/07/22 0915   • ketorolac (TORADOL) injection 30 mg  30 mg Intravenous Q6H Alysha Curran MD       • naloxone (NARCAN) injection 0.4 mg  0.4 mg Intravenous Q15 Min PRN Neris Reyes CRNA   0.04 mg at 03/07/22 1040   • prenatal vitamin tablet 1 tablet  1 tablet Oral Daily Alysha Curran MD   1 tablet at 03/07/22 0915        Operative/Procedure Notes (last 24 hours)      Alysha Curran  MD Mariela at 22 0818        [facility]  Operative Report    Name: Zahra Gasca  MRN: [id number]  Date: 3/7/2022  CSN: [id number]      Location: [facility] LABOR DELIVERY    Service: Obstetrics    Pre-op Diagnosis:   1.  IUP at 36w2d  2.  Placenta previa  3.  IUGR  4.  GDMA1  5.  Prior  section x1  6.  History of pre-eclampsia (with flash pulmonary edema)    Post-op Diagnosis: Same    Surgeon: Alysha Curran MD FACOG    Assistant: Ashleigh Olivares CSA    Staff:  Circulator: Mercy Eugene RN  Scrub Person: Maira Root & LIANNE Nurse: Jessica Pedroza RN; Alice Montgomery RN  NICU Nurse: Onelia Luis RN  Assistant: Arlene Olivares CSA    Anesthesia: Spinal w/ Duramorph    Anesthesia Staff:  CRNA: Neris Reyes CRNA    Operation: Repeat low-transverse  section, TAP block    Drains: Azevedo catheter, draining clear yellow urine    Complications: None    Findings: Normal appearing abdomen.  Minimal fascia scarring.  Bladder scarred to lower uterine segment, although uterus small.  Delivery of viable female  weighing 2240 grams with Apgars of 9 and 9.  Bilateral fallopian tubes and ovaries normal.      Condition: Stable    Specimens/Disposition: Placenta and umbilical cord    Estimated Blood Loss: 800 mL  Quantitative Blood Loss: 788 mL  IV Fluids: 1300 mL crystalloid  Urine Output: 75 mL    Indications: Zahra Gasca, 36 y.o.,  at 36w2d with complete placenta previa requiring admission since late 2nd trimester due to multiple episodes of bleeding.      Description of Operation:  The patient was identified and the procedure verified as a  section delivery.  The patient was given spinal anesthesia.  Patient prepared and draped in normal sterile fashion in dorsal supine position with a leftward tilt.  A transverse skin incision was made with the scalpel and carried down through the subcutaneous tissue to the fascia using  the Bovie.  Fascial incision was made with the Bovie and extended transversely with Mcguire scissors.  The superior aspect of the fascia was grasped with Kocher clamps and the rectus muscle was dissected off bluntly and sharply with Mcguire scissors.  The inferior aspect of the fascia was then grasped with Kocher clamps and the rectus muscle and pyramidalis were dissected off bluntly and then sharply with Mcguire scissors.  The rectus muscle was then  in the midline and the peritoneum was identified and entered bluntly.  The peritoneal incision was extended transversely.  Bladder blade and retractor were inserted.  The uterovesical peritoneal reflection was identified and incised transversely with Metzenbaum scissors.  The bladder flap was bluntly freed from the lower uterine segment. The bladder blade was adjusted.  A low transverse uterine incision was made with a scalpel and the uterine incision was extended with upward traction.  The amniotic sac was ruptured with an Allis clamp.  Delivered from cephalic presentation was a live female  weighing 2240 grams with Apgar scores of 9 at one minute and 9 at five minutes.  Cord clamped and cut and infant with bulb suction were handed to awaiting staff.  Cord blood was obtained and sent.  The placenta was removed intact and appeared normal.  Uterus exteriorized and cleared of all clots and debris.  The uterus, tubes and ovaries appeared normal.  The uterine incision was closed with running locked sutures of 0-Vicryl and imbricated with 0-Vicryl.  Hemostasis achieved with multiple interrupted 2-0 Vicryl sutures as the lower uterine segment was quite vascular although no accreta noted.  Posterior cul-de-sac was cleared.  Uterus was reinserted atraumatically.  Hemostasis was observed.  Bilateral paracolic gutters cleared.  Inspection of the abdomen and pelvis prior to abdominal wall closure revealed no evidence of retained instruments or sponges.  Prior to closure of  the abdomen, bilateral transversus abdominus planus block was performed with 15 cc of 0.25% bupivacaine on each side under direct visualization.  Edward applied to hysterotomy.  The fascia was then reapproximated with running sutures of 0-Vicryl.  Subcutaneous layer closed with 2-0 plain gut.  The skin was reapproximated with 3-0 Monocryl in subcuticular fashion.  Exofin dressing applied.  There were no intraoperative complications and patient tolerated the procedure well.  The patient was escorted to her room in stable condition.    The patient received Ancef 2g for antibiotic prophylaxis prior to the start of the procedure.    Ashleigh Olivares CSA was responsible for performing the following activities: Retraction, Suction, Irrigation, Suturing, Closing, Placing Dressing and Delivery of Fetus and their skilled assistance was necessary for the success of this case.    This document has been electronically signed by Alysha Curran MD on March 7, 2022 08:18 CST.    Electronically signed by Alysha Curran MD at 03/07/22 0823          Physician Progress Notes (last 24 hours)      Alysha Curran MD at 03/07/22 0639          [facility]  Progress Note - Antepartum    Name: Zahra Gasca  MRN: [id number]  Location: [facility]   Date: 3/7/2022  CSN: [id number]     HD #[id number] - Admitted at 30w0d for recurrent vaginal bleeding in the context of placenta previa and FGR.     SUBJECTIVE  Patient seen and examined.  Patient has no complaints.  Denies any contractions, LOF, or VB.  Reports good FM.  Denies any fevers, chills, chest pain, SOB, abdominal pain, or calf pain.    OBJECTIVE  /58 (BP Location: Left arm, Patient Position: Lying)   Pulse 78   Temp 98 °F (36.7 °C) (Oral)   Resp 18   Wt 69.8 kg (153 lb 12.8 oz)   LMP 06/26/2021 (Approximate)   SpO2 98%   Breastfeeding No   BMI 27.24 kg/m²   Gen: NAD, AAO x3, pleasant  CV: RRR, no murmurs/rubs/gallops  Lungs: CTAB, no  wheezes/rhonchi/rales  Abd: Soft, gravid, uterus nontender  Ext: No edema      LABS  Labs reviewed as indicated below:  WBC   Date Value Ref Range Status   03/07/2022 8.45 3.40 - 10.80 10*3/mm3 Final     RBC   Date Value Ref Range Status   03/07/2022 4.16 3.77 - 5.28 10*6/mm3 Final     Hemoglobin   Date Value Ref Range Status   03/07/2022 10.9 (L) 12.0 - 15.9 g/dL Final     Hematocrit   Date Value Ref Range Status   03/07/2022 33.7 (L) 34.0 - 46.6 % Final     MCV   Date Value Ref Range Status   03/07/2022 81.0 79.0 - 97.0 fL Final     MCH   Date Value Ref Range Status   03/07/2022 26.2 (L) 26.6 - 33.0 pg Final     MCHC   Date Value Ref Range Status   03/07/2022 32.3 31.5 - 35.7 g/dL Final     RDW   Date Value Ref Range Status   03/07/2022 16.8 (H) 12.3 - 15.4 % Final     RDW-SD   Date Value Ref Range Status   03/07/2022 48.2 37.0 - 54.0 fl Final     MPV   Date Value Ref Range Status   03/07/2022 10.7 6.0 - 12.0 fL Final     Platelets   Date Value Ref Range Status   03/07/2022 168 140 - 450 10*3/mm3 Final     Neutrophil %   Date Value Ref Range Status   03/07/2022 73.3 42.7 - 76.0 % Final     Lymphocyte %   Date Value Ref Range Status   03/07/2022 19.1 (L) 19.6 - 45.3 % Final     Monocyte %   Date Value Ref Range Status   03/07/2022 5.8 5.0 - 12.0 % Final     Eosinophil %   Date Value Ref Range Status   03/07/2022 1.1 0.3 - 6.2 % Final     Basophil %   Date Value Ref Range Status   03/07/2022 0.2 0.0 - 1.5 % Final     Immature Grans %   Date Value Ref Range Status   03/07/2022 0.5 0.0 - 0.5 % Final     Neutrophils, Absolute   Date Value Ref Range Status   03/07/2022 6.20 1.70 - 7.00 10*3/mm3 Final     Lymphocytes, Absolute   Date Value Ref Range Status   03/07/2022 1.61 0.70 - 3.10 10*3/mm3 Final     Monocytes, Absolute   Date Value Ref Range Status   03/07/2022 0.49 0.10 - 0.90 10*3/mm3 Final     Eosinophils, Absolute   Date Value Ref Range Status   03/07/2022 0.09 0.00 - 0.40 10*3/mm3 Final     Basophils, Absolute    Date Value Ref Range Status   2022 0.02 0.00 - 0.20 10*3/mm3 Final     Immature Grans, Absolute   Date Value Ref Range Status   2022 0.04 0.00 - 0.05 10*3/mm3 Final     nRBC   Date Value Ref Range Status   2022 0.0 0.0 - 0.2 /100 WBC Final         IMAGING  2022: cephalic, posterior placenta marginal previa, MIKIE 12.04 cm, EFW 9%ile, AC 18%ile  2022: BPP 8/8, UAD elevated  2022: BPP 8/8, UAD WNL  2022: BPP 8/8, UAD WNL, low-lying posterior placenta, cephalic  2/3/2022: BPP 6/8, UAD WNL, placenta previa, cephalic  2022: BPP 8/8, EFW 1417g (<3%ile) w/ AC <3%ile, UAD WNL  2/10/2022: BPP 8/8, UAD WNL  2022: BPP 8/8, UAD borderline (S/D 3.5)  2022: BPP 8/8, UAD borderline (S/D 3.3, 3.7)  2022: BPP 8/8, UAD WNL  2022: BPP 8/8, UAD WNL  2022: EFW 2053g (4%ile) w/ AC 6%ile, BPP 6/8, MIKIE 11.3 cm, UAD borderline (S/D 3.3, 4.1)  3/2/2022: BPP 8/8, UAD WNL    ASSESSMENT  Zahra Gasca is 36 y.o.  at 36w2d admitted for significant repeat bleeding with known placenta previa.  Hemodynamically stable with no evidence of ongoing active bleed.  Obstetrically stable with reassuring fetal status with FGR..    PLAN  1.  Recurrent vaginal bleeding with known placenta previa, stable  - s/p BMZ benefit  (2nd course)  - Disposition: Inpatient management until delivery via CS at 36 weeks unless sooner for worsening bleeding or other indications.  Inpatient admission required given risks of catastrophic outcomes including maternal and/or fetal death should she go home and have a catastrophic bleed.  - Delivery scheduled for today via CS     2.  Severe FGR <3%ile  - TORCH titers ordered  - NIPT low risk  - BPP w/ UAD 2x/week (since severe IUGR)     3.  GDMA1  - Fasting and 2h PP   - BS within goal or near goal     4.  History of preeclampsia with severe features  - Monitor BP     5.  Prior  section x1  - Reviewed 11% risk of accreta  - Plan for RLTCS  today especially given placenta previa  - Declines permanent sterilization     7.  Rh negative  - s/p RhoGAM on     This document has been electronically signed by Guzman Dunaway, Medical Student on 2022 06:39 CST.    Attending Addendum  Patient seen and examined this morning.  Proceed with RLTCS at 36w2d secondary to placenta previa.  Reviewed risks and benefits to include injury to surrounding organs (bowel, bladder, ureters, blood vessels, nerves, baby), infection, bleeding (possibly requiring blood transfusion and/or hysterectomy), and maternal/fetal death.    This document has been electronically signed by Alysha Curran MD on 2022 07:02 CST.    Electronically signed by Alysha Curran MD at 22       Medical Student Notes (last 24 hours)  Notes from 22 110 through 22 110   No notes of this type exist for this encounter.         Consult Notes (last 24 hours)  Notes from 22 1105 through 22 110   No notes of this type exist for this encounter.            Alysha Curran MD    Physician   Obstetrics   L&D Delivery Note      Shared   Date of Service:  22   Creation Time:  22              Shared              Show:Clear all  [x]Manual[x]Template[]Copied    Added by:  [x]Alysha Curran MD      []Cristine for details    [facility]   Delivery Note   Review the Delivery Report for details.         Delivery      Delivery: , Low Transverse     YOB: 2022    Time of Birth:  Gestational Age 7:37 AM   36w2d      Anesthesia: Spinal     Delivering clinician: Alysha Curran    Forceps?   No   Vacuum? No    Shoulder dystocia present: No                   Delivery narrative:  See operative report     Infant     Findings: female  infant      Infant observations: Weight: 2240 g (4 lb 15 oz)   Length:   in  Observations/Comments:        Apgars:   @ 1 minute  "/       @ 5 minutes                Placenta, Cord, and Fluid      Placenta delivered  Expressed  at   3/7/2022  7:38 AM      Cord: 3 vessels  present.   Nuchal Cord?  no    Cord blood obtained: Yes     Cord gases obtained:             No     Cord gas results: Venous:  No results found for: PHCVEN      Arterial:  No results found for: PHCART       Repair     Episiotomy: None    No    Lacerations: No   Estimated Blood Loss:                 Quantitative Blood Loss: Quantitative Blood Loss (mL):  (\"2 drops of brown colored blood after bowel movement\") (01/23/22 1100)            Complications  Placenta previa  FGR  GDMA1     Disposition  Mother to Mother Baby/Postpartum in stable condition currently.  Baby to NBN in stable condition currently.     Alysha Curran MD  03/07/22  08:23 CST                      "

## 2022-03-07 NOTE — ANESTHESIA POSTPROCEDURE EVALUATION
Patient: Zahra Gasca    Procedure Summary     Date: 22 Room / Location: [facility] LABOR DELIVERY  [facility] LABOR DELIVERY    Anesthesia Start: 716 Anesthesia Stop: 835    Procedure: Repeat  section (N/A Abdomen) Diagnosis:       Placenta previa antepartum      (Placenta previa antepartum [O44.00])    Surgeons: Alysha Curran MD Provider: Neris Reyes CRNA    Anesthesia Type: spinal ASA Status: 3          Anesthesia Type: spinal    Vitals  Vitals Value Taken Time   /84 22 0831   Temp     Pulse 111 22   Resp     SpO2 99 % 22   Vitals shown include unvalidated device data.        Post Anesthesia Care and Evaluation    Patient location during evaluation: bedside  Patient participation: complete - patient participated  Level of consciousness: awake  Pain score: 0  Pain management: adequate  Airway patency: patent  Anesthetic complications: No anesthetic complications  PONV Status: none  Cardiovascular status: acceptable and hemodynamically stable  Respiratory status: acceptable and room air  Hydration status: acceptable  Post Neuraxial Block status: No signs or symptoms of PDPH  Comments: -------------------------              2235        -------------------------   BP:         142/78      Pulse:        84          Resp:         18          Temp:   98 °F (36.7 °C)   SpO2:         97%        -------------------------

## 2022-03-07 NOTE — ANESTHESIA PROCEDURE NOTES
Spinal Block      Patient reassessed immediately prior to procedure    Patient location during procedure: OR  Start Time: 3/7/2022 7:19 AM  Stop Time: 3/7/2022 7:23 AM  Indication:at surgeon's request and post-op pain management  Performed By  CRNA: Neris Reyes CRNA  Preanesthetic Checklist  Completed: patient identified, IV checked, site marked, risks and benefits discussed, surgical consent, monitors and equipment checked, pre-op evaluation and timeout performed  Spinal Block Prep:  Patient Position:sitting  Sterile Tech:cap, gloves, mask and sterile barriers  Prep:Betadine  Patient Monitoring:blood pressure monitoring and continuous pulse oximetry  Spinal Block Procedure  Approach:midline  Guidance:landmark technique  Location:L3-L4  Needle Type:Pencan  Needle Gauge:24 G  Placement of Spinal needle event:cerebrospinal fluid aspirated  Paresthesia: no  Fluid Appearance:clear  Medications: bupivacaine PF (MARCAINE) 0.75 % injection, 1.4 mL  Med Administered at 3/7/2022 7:23 AM   Post Assessment  Patient Tolerance:patient tolerated the procedure well with no apparent complications  Complications no

## 2022-03-07 NOTE — NON STRESS TEST
Zahra Gasca, a  at 36w2d with an ALEXEY of 2022, by Last Menstrual Period, was seen at [facility] for a nonstress test.    Chief Complaint   Patient presents with   • Vaginal Bleeding     1215   • other     reports fetal movement       Patient Active Problem List   Diagnosis   • Anxiety state   • Multigravida of advanced maternal age in second trimester   • Anxiety during pregnancy   • Nausea and vomiting during pregnancy prior to 22 weeks gestation   • History of severe pre-eclampsia   • Previous  delivery affecting pregnancy   • Supervision of high risk pregnancy in second trimester   • Placenta previa in second trimester   • Nephrolithiasis   • Placenta previa antepartum       Start Time: 604  Stop Time: 624    Interpretation A  Nonstress Test Interpretation A: Reactive (22 : Mirna Live, RN)  Comments A: Reviewed with Tyra COMBS RN (22 : Mirna Live, RN)

## 2022-03-08 VITALS
DIASTOLIC BLOOD PRESSURE: 60 MMHG | RESPIRATION RATE: 16 BRPM | WEIGHT: 153.8 LBS | BODY MASS INDEX: 27.24 KG/M2 | SYSTOLIC BLOOD PRESSURE: 133 MMHG | OXYGEN SATURATION: 100 % | HEART RATE: 76 BPM | TEMPERATURE: 98.7 F

## 2022-03-08 LAB
FETAL BLEED: NEGATIVE
HCT VFR BLD AUTO: 31.2 % (ref 34–46.6)
HGB BLD-MCNC: 10.2 G/DL (ref 12–15.9)
NUMBER OF DOSES: NORMAL

## 2022-03-08 PROCEDURE — 85014 HEMATOCRIT: CPT | Performed by: OBSTETRICS & GYNECOLOGY

## 2022-03-08 PROCEDURE — 0503F POSTPARTUM CARE VISIT: CPT | Performed by: OBSTETRICS & GYNECOLOGY

## 2022-03-08 PROCEDURE — 85461 HEMOGLOBIN FETAL: CPT | Performed by: OBSTETRICS & GYNECOLOGY

## 2022-03-08 PROCEDURE — 85018 HEMOGLOBIN: CPT | Performed by: OBSTETRICS & GYNECOLOGY

## 2022-03-08 PROCEDURE — 25010000002 DIPHENHYDRAMINE PER 50 MG: Performed by: NURSE ANESTHETIST, CERTIFIED REGISTERED

## 2022-03-08 PROCEDURE — 25010000002 KETOROLAC TROMETHAMINE PER 15 MG: Performed by: OBSTETRICS & GYNECOLOGY

## 2022-03-08 PROCEDURE — 25010000002 NALOXONE PER 1 MG: Performed by: OBSTETRICS & GYNECOLOGY

## 2022-03-08 PROCEDURE — 63710000001 DIPHENHYDRAMINE PER 50 MG: Performed by: NURSE ANESTHETIST, CERTIFIED REGISTERED

## 2022-03-08 RX ORDER — FERROUS SULFATE TAB EC 324 MG (65 MG FE EQUIVALENT) 324 (65 FE) MG
324 TABLET DELAYED RESPONSE ORAL
Qty: 30 TABLET | Refills: 1 | Status: SHIPPED | OUTPATIENT
Start: 2022-03-08 | End: 2022-05-11

## 2022-03-08 RX ORDER — HYDROXYZINE HYDROCHLORIDE 25 MG/1
25 TABLET, FILM COATED ORAL 3 TIMES DAILY PRN
Status: DISCONTINUED | OUTPATIENT
Start: 2022-03-08 | End: 2022-03-09 | Stop reason: HOSPADM

## 2022-03-08 RX ORDER — OXYCODONE HYDROCHLORIDE 5 MG/1
5 TABLET ORAL EVERY 4 HOURS PRN
Qty: 30 TABLET | Refills: 0 | Status: SHIPPED | OUTPATIENT
Start: 2022-03-08 | End: 2022-03-14

## 2022-03-08 RX ORDER — ACETAMINOPHEN 325 MG/1
650 TABLET ORAL EVERY 4 HOURS PRN
Qty: 100 TABLET | Refills: 2 | Status: SHIPPED | OUTPATIENT
Start: 2022-03-08 | End: 2022-03-18

## 2022-03-08 RX ORDER — PSEUDOEPHEDRINE HCL 30 MG
100 TABLET ORAL 2 TIMES DAILY
Qty: 60 CAPSULE | Refills: 1 | Status: SHIPPED | OUTPATIENT
Start: 2022-03-08 | End: 2022-03-18

## 2022-03-08 RX ORDER — IBUPROFEN 800 MG/1
800 TABLET ORAL EVERY 8 HOURS
Qty: 40 TABLET | Refills: 1 | Status: SHIPPED | OUTPATIENT
Start: 2022-03-08 | End: 2022-05-10

## 2022-03-08 RX ORDER — NALOXONE HYDROCHLORIDE 0.4 MG/ML
0.2 INJECTION, SOLUTION INTRAMUSCULAR; INTRAVENOUS; SUBCUTANEOUS ONCE
Status: DISCONTINUED | OUTPATIENT
Start: 2022-03-08 | End: 2022-03-08

## 2022-03-08 RX ORDER — NALOXONE HYDROCHLORIDE 0.4 MG/ML
0.4 INJECTION, SOLUTION INTRAMUSCULAR; INTRAVENOUS; SUBCUTANEOUS ONCE
Status: COMPLETED | OUTPATIENT
Start: 2022-03-08 | End: 2022-03-08

## 2022-03-08 RX ADMIN — PRENATAL VIT W/ FE FUMARATE-FA TAB 27-0.8 MG 1 TABLET: 27-0.8 TAB at 07:38

## 2022-03-08 RX ADMIN — FERROUS SULFATE TAB EC 324 MG (65 MG FE EQUIVALENT) 324 MG: 324 (65 FE) TABLET DELAYED RESPONSE at 07:38

## 2022-03-08 RX ADMIN — DIPHENHYDRAMINE HYDROCHLORIDE 25 MG: 25 CAPSULE ORAL at 01:40

## 2022-03-08 RX ADMIN — NALOXONE HYDROCHLORIDE 0.4 MG: 0.4 INJECTION, SOLUTION INTRAMUSCULAR; INTRAVENOUS; SUBCUTANEOUS at 07:38

## 2022-03-08 RX ADMIN — KETOROLAC TROMETHAMINE 30 MG: 30 INJECTION, SOLUTION INTRAMUSCULAR; INTRAVENOUS at 03:10

## 2022-03-08 RX ADMIN — DOCUSATE SODIUM 100 MG: 100 CAPSULE, LIQUID FILLED ORAL at 07:37

## 2022-03-08 RX ADMIN — HYDROXYZINE HYDROCHLORIDE 25 MG: 25 TABLET, FILM COATED ORAL at 11:22

## 2022-03-08 RX ADMIN — IBUPROFEN 800 MG: 800 TABLET, FILM COATED ORAL at 11:22

## 2022-03-08 RX ADMIN — ACETAMINOPHEN 1000 MG: 500 TABLET, FILM COATED ORAL at 11:22

## 2022-03-08 RX ADMIN — SIMETHICONE 80 MG: 80 TABLET, CHEWABLE ORAL at 07:38

## 2022-03-08 RX ADMIN — ACETAMINOPHEN 1000 MG: 500 TABLET, FILM COATED ORAL at 05:15

## 2022-03-08 RX ADMIN — DIPHENHYDRAMINE HYDROCHLORIDE 25 MG: 50 INJECTION INTRAMUSCULAR; INTRAVENOUS at 05:15

## 2022-03-08 NOTE — PLAN OF CARE
Problem: Adult Inpatient Plan of Care  Goal: Plan of Care Review  Outcome: Ongoing, Progressing  Flowsheets (Taken 3/8/2022 2430)  Progress: improving  Outcome Evaluation: VSS, pain controlled with scheduled pain meds, itching decreasing after interventions, fudus firm,   Goal Outcome Evaluation:  Plan of Care Reviewed With: patient        Progress: improving  Outcome Evaluation: VSS, pain controlled with scheduled pain meds, itching decreasing after interventions, fudus firm,

## 2022-03-08 NOTE — PLAN OF CARE
Problem: Adult Inpatient Plan of Care  Goal: Plan of Care Review  Outcome: Ongoing, Progressing  Flowsheets  Taken 3/8/2022 0436 by Lisa Mcgee, RN  Progress: no change  Outcome Evaluation: VSS, pain well controlled, pt very itchy- believe its related to toradol admin., benedryl given, pt states ineffective after brief period. reassurances provided, ice packs and lotion offered but refused.  Taken 3/7/2022 1827 by Carla Rincon RN  Plan of Care Reviewed With: patient   Goal Outcome Evaluation:  Plan of Care Reviewed With: patient        Progress: no change  Outcome Evaluation: VSS, pain well controlled, pt very itchy- believe its related to toradol admin., benedryl given, pt states ineffective after brief period. reassurances provided, ice packs and lotion offered but refused.

## 2022-03-08 NOTE — LACTATION NOTE
Met with mother this afternoon. We worked on latch positioning and hand expression was demonstrated. Mother was able to hand express milk out. Encouragement given to keep latching infant and minimize supplementing as much as possible if infant is latching and nursing well.

## 2022-03-08 NOTE — PLAN OF CARE
Problem: Adult Inpatient Plan of Care  Goal: Plan of Care Review  Outcome: Ongoing, Progressing  Flowsheets (Taken 3/7/2022 9606)  Progress: improving  Plan of Care Reviewed With: patient  Outcome Evaluation: VSS, Minimal pain with scheduled meds, complaints of itching, stood at bedside, lochia scant fundus firm   Goal Outcome Evaluation:  Plan of Care Reviewed With: patient        Progress: improving  Outcome Evaluation: VSS, Minimal pain with scheduled meds, complaints of itching, stood at bedside, lochia scant fundus firm

## 2022-03-09 NOTE — PAYOR COMM NOTE
"Gloria Pappasmore  [facility]  Case Management Extender  [phone] phone  [phone] fax      Auth# [id number]    Notification that patient d/c home on 3/8/2022    CamposZahra (36 y.o. Female)             Date of Birth   1985    Social Security Number       Address   [address] [facility] [address]    Home Phone   [phone]    MRN   [id number]       [facility]   None    Marital Status                               Admission Date   1/22/22    Admission Type   Elective    Admitting Provider   Noy Parra DO    Attending Provider       Department, Room/Bed   [facility], M767/1       Discharge Date   3/8/2022    Discharge Disposition   Home or Self Care    Discharge Destination                               Attending Provider: (none)   Allergies: No Known Allergies    Isolation: None   Infection: None   Code Status: Prior   Advance Care Planning Activity    Ht: 160 cm (63\")   Wt: 69.8 kg (153 lb 12.8 oz)    Admission Cmt: None   Principal Problem: Placenta previa antepartum [O44.00]                 Active Insurance as of 1/22/2022     Primary Coverage     Payor Plan Insurance Group Employer/Plan Group    [employer]UE [employer] PPO [id number]     Payor Plan Address Payor Plan Phone Number Payor Plan Fax Number Effective Dates    [address] [phone]  8/17/2019 - None Entered    [place] [address]       Subscriber Name Subscriber Birth Date Member ID       JAMARCUS ZULETA 11/30/1983 [id number]                 Emergency Contacts      (Rel.) Home Phone Work Phone Mobile Phone    CamposJamarcus (Spouse) [phone] -- [phone]            Discharge Summary    No notes of this type exist for this encounter.         "

## 2022-03-09 NOTE — DISCHARGE SUMMARY
[facility]  Discharge Summary  Patient Name: Zahra Gasca  : 1985  MRN: [id number]  CSN: [id number]    Discharge Summary    Date of Admission: 2022   Date of Discharge: 3/8/2022    Principle Discharge Dx: Active Hospital Problems    Diagnosis  POA   • **Placenta previa antepartum [O44.00]  Yes   • Single delivery by  section [O82]  No   • Placenta previa in third trimester [O44.03]  Yes     Diagnosed on  at 20w3d  Pelvic rest restrictions given; discussed risks of placenta accreta if persistent  Repeat US 28-32 wks to see if resolved     • Previous  delivery affecting pregnancy [O34.219]  Yes     LTCS x1 (secondary to preE w/ pulmonary edema)     • Supervision of high risk pregnancy in third trimester [O09.93]  Not Applicable   • Multigravida of advanced maternal age in third trimester [O09.523]  Yes   • Anxiety during pregnancy [O99.340, F41.9]  Yes   • Nausea and vomiting during pregnancy prior to 22 weeks gestation [O21.9]  Yes   • History of severe pre-eclampsia [Z87.59]  Not Applicable     Severe preE w/ pulmonary edema  Baseline labs WNL        Procedures Performed: Procedure(s):  Repeat  section   Brief History: Patient is a 36 y.o. now  who presented to labor and delivery with a recurrent episode of heavy vaginal bleeding with a known placenta previa. She had previously left AMA after observation recommended for one episode of bleeding and had two more minor bleeding episodes at home and then the heavy episode that brought her to the hospital (see H&P for further details).   Hospital Course: Due to the risk of catastrophic vaginal bleeding that can result in morbidity or mortality of the fetus or mother, it was recommended as per standard of care that patient was admitted until delivery, with planned delivery at  36 weeks for placenta previa. Pregnancy also complicated by fetal growth restriction and GDMA1 (diagnosed during admission)  and history of CS x1, h/o preeclampsia with flash pulmonary edema in prior pregnancy. At 36+2 she had a RLTCS (scheduled on weekday to optimize staff available and present due to risk of accreta with a previa and prior CS).  Her postoperative course was unremarkable.  On POD #1 she expressed the desire for discharge.  She had passed gas and was urinating normally.  She was eating a regular diet without difficulty.  She was ambulating well.  Her incision was clean, dry and intact. She was seen by Dr. Curran that morning and desired discharge in the evening when the baby was cleared for discharge. Discharge instructions were given.  All questions were answered   Condition:  Discharge Activity:  Discharge Diet: Stable  Activity Instructions       Bathing Restrictions      Type of Restriction: Bathing    Bathing Restrictions: Other    Explain Bathing Restrictions: No soaking in bathtub for 4 weeks/ Showers are fine.    Driving Restrictions      Type of Restriction: Driving    Driving Restrictions: No Driving (Time Limited)    Length: Other    Indicate Length of Restriction: No driving for 1 week or while on narcotic pain medications. You must be able to quickly press on the brake before driving. Riding is car is fine.    Lifting Restrictions      Type of Restriction: Lifting    Lifting Restrictions: Other    Explain Lifing Restrictions: No lifting more than infant and baby carrier together for 6 weeks.    Pelvic Rest      Nothing in the vagina for 6 weeks to include tampons, douching, or sexual intercourse.    Sexual Activity Restrictions      Type of Restriction: Sex    Explain Sexual Activity Restrictions: No sexual intercourse, tampon use, or douching for at least 6 weeks          Regular   Discharge Medications:    Your medication list        START taking these medications        Instructions Last Dose Given Next Dose Due   acetaminophen 325 MG tablet  Commonly known as: Tylenol      Take 2 tablets by mouth Every 4  (Four) Hours As Needed for Mild Pain .       docusate sodium 100 MG capsule      Take 1 capsule by mouth 2 (Two) Times a Day.       ferrous sulfate 324 (65 Fe) MG tablet delayed-release EC tablet      Take 1 tablet by mouth Daily With Breakfast.       ibuprofen 800 MG tablet  Commonly known as: ADVIL,MOTRIN      Take 1 tablet by mouth Every 8 (Eight) Hours.       oxyCODONE 5 MG immediate release tablet  Commonly known as: ROXICODONE      Take 1 tablet by mouth Every 4 (Four) Hours As Needed for Moderate Pain  or Severe Pain  for up to 6 days.              CONTINUE taking these medications        Instructions Last Dose Given Next Dose Due   prenatal (CLASSIC) vitamin  tablet  Generic drug: prenatal vitamin      Take  by mouth Daily.                 Where to Get Your Medications        Information about where to get these medications is not yet available    Ask your nurse or doctor about these medications  · acetaminophen 325 MG tablet  · docusate sodium 100 MG capsule  · ferrous sulfate 324 (65 Fe) MG tablet delayed-release EC tablet  · ibuprofen 800 MG tablet  · oxyCODONE 5 MG immediate release tablet        Discharge Disposition: Home   Follow-up: Future Appointments   Date Time Provider Department Center   5/10/2022  9:45 AM Niecy Butler MD [facility]     1 week PP visit  6 week PP visit     <30 minutes spent with the patient.      This document has been electronically signed by Noy Parra DO on March 8, 2022 20:58 CST

## 2022-03-09 NOTE — DISCHARGE INSTRUCTIONS
"Notify doctor of heavy bleeding, passing clots, foul odor to your discharge, temp above 100.4, burning when urinating, gapping or drainage from incision, or pain not relieved when taking as needed medications, redness, streaking in breasts, pain or redness in legs.    Take all meds as prescribed.  Continue taking iron or prenatal vitamin while breastfeeding or until you run out.  Take rest periods several times durinig the day.  \"Baby blues\" are normal and my be present around the 3rd or 4th day after delivery. If they last longer than 2-3 days please let the doc know.   Pelvic rest for 6 weeks. No tampons or intercourse  No driving for 2 weeks  No lifting anything heavier than the baby  Wear a good supportive bra 24 hours/day to prevent engorgement  "

## 2022-03-10 LAB
LAB AP CASE REPORT: NORMAL
LAB AP CLINICAL INFORMATION: NORMAL
PATH REPORT.FINAL DX SPEC: NORMAL

## 2022-03-12 ENCOUNTER — TELEPHONE (OUTPATIENT)
Dept: OBSTETRICS AND GYNECOLOGY | Facility: CLINIC | Age: 37
End: 2022-03-12

## 2022-03-12 DIAGNOSIS — O26.893 RH NEGATIVE STATUS DURING PREGNANCY IN THIRD TRIMESTER: Primary | ICD-10-CM

## 2022-03-12 DIAGNOSIS — O44.02 PLACENTA PREVIA IN SECOND TRIMESTER: ICD-10-CM

## 2022-03-12 DIAGNOSIS — Z67.91 RH NEGATIVE STATUS DURING PREGNANCY IN THIRD TRIMESTER: Primary | ICD-10-CM

## 2022-03-12 NOTE — TELEPHONE ENCOUNTER
Patient left on Tuesday PM without receiving IM RhoGAM.  L&D was notified by [facility] this morning regarding this.  She was called and asked to come to L&D for RhoGAM injection.  RhoGAM prescribed (1 vial).    Alysha Curran MD  3/12/2022  16:59 CST

## 2022-03-13 ENCOUNTER — HOSPITAL ENCOUNTER (OUTPATIENT)
Facility: HOSPITAL | Age: 37
End: 2022-03-13
Attending: OBSTETRICS & GYNECOLOGY | Admitting: OBSTETRICS & GYNECOLOGY

## 2022-03-13 ENCOUNTER — HOSPITAL ENCOUNTER (OUTPATIENT)
Facility: HOSPITAL | Age: 37
Discharge: HOME | End: 2022-03-13
Attending: OBSTETRICS & GYNECOLOGY | Admitting: OBSTETRICS & GYNECOLOGY

## 2022-03-13 PROCEDURE — 25010000002 RHO D IMMUNE GLOBULIN 1500 UNITS SOLUTION PREFILLED SYRINGE: Performed by: OBSTETRICS & GYNECOLOGY

## 2022-03-13 PROCEDURE — 96372 THER/PROPH/DIAG INJ SC/IM: CPT

## 2022-03-13 RX ADMIN — RHO(D) IMMUNE GLOBULIN (HUMAN) 1500 UNITS: 1500 SOLUTION INTRAMUSCULAR at 15:18

## 2022-03-18 ENCOUNTER — POSTPARTUM VISIT (OUTPATIENT)
Dept: OBSTETRICS AND GYNECOLOGY | Facility: CLINIC | Age: 37
End: 2022-03-18

## 2022-03-18 VITALS
SYSTOLIC BLOOD PRESSURE: 110 MMHG | DIASTOLIC BLOOD PRESSURE: 64 MMHG | BODY MASS INDEX: 25.52 KG/M2 | HEIGHT: 63 IN | WEIGHT: 144 LBS

## 2022-03-18 PROCEDURE — 0503F POSTPARTUM CARE VISIT: CPT | Performed by: OBSTETRICS & GYNECOLOGY

## 2022-03-18 NOTE — PROGRESS NOTES
"Postpartum visit      Zahra Gasca is a 36 y.o.  s/p , Low Transverse on 3/7/2022 at 36w2d secondary to placenta previa after prolonged admission due to multiple bleeding episodes who presents today for postpartum check.  The patient states she is doing well.  Patient denies postpartum depression.  Menstrual cycles have not resumed.  Breastfeeding.  Desires IUD for contraception.  She has not resumed sexual intercourse.  Denies bowel or bladder issues.    PHYSICAL EXAM:    /64   Ht 160 cm (63\")   Wt 65.3 kg (144 lb)   LMP 2021 (Approximate)   Breastfeeding Yes   BMI 25.51 kg/m²   Abd: Soft, NTND, incision c/d/i; Dermabond dressing removed  Extremities: No deep calf tenderness.  Postpartum Depression Screening Questionnaire: 5    IMPRESSION/PLAN: Zahra Gasca is a 36 y.o.  s/p , Low Transverse on 3/7.  Doing well.  - Recovered nicely from her delivery  - Contraception: Mireana IUD  - RTC 5 weeks for final PP visit w/ Mirena IUD insertion   - Advised patient to take 800 mg ibuprofen 1h prior to appt    This document has been electronically signed by Alysha Curran MD on 2022 12:33 CDT.  "

## 2022-03-24 ENCOUNTER — TELEPHONE (OUTPATIENT)
Dept: LACTATION | Facility: HOSPITAL | Age: 37
End: 2022-03-24

## 2022-05-10 ENCOUNTER — OFFICE VISIT (OUTPATIENT)
Dept: FAMILY MEDICINE CLINIC | Facility: CLINIC | Age: 37
End: 2022-05-10

## 2022-05-10 VITALS
DIASTOLIC BLOOD PRESSURE: 70 MMHG | HEIGHT: 63 IN | SYSTOLIC BLOOD PRESSURE: 102 MMHG | HEART RATE: 95 BPM | BODY MASS INDEX: 25.34 KG/M2 | WEIGHT: 143 LBS | OXYGEN SATURATION: 98 %

## 2022-05-10 DIAGNOSIS — L71.9 ROSACEA: ICD-10-CM

## 2022-05-10 DIAGNOSIS — L91.8 CUTANEOUS SKIN TAGS: ICD-10-CM

## 2022-05-10 DIAGNOSIS — F41.9 ANXIETY: Primary | ICD-10-CM

## 2022-05-10 PROCEDURE — 99213 OFFICE O/P EST LOW 20 MIN: CPT | Performed by: FAMILY MEDICINE

## 2022-05-10 RX ORDER — ESCITALOPRAM OXALATE 10 MG/1
10 TABLET ORAL DAILY
Qty: 30 TABLET | Refills: 2 | Status: SHIPPED | OUTPATIENT
Start: 2022-05-10 | End: 2022-06-03

## 2022-05-10 NOTE — PROGRESS NOTES
"Chief Complaint  Rash    Subjective    History of Present Illness {CC  Problem List  Visit  Diagnosis   Encounters  Notes  Medications  Labs  Result Review Imaging  Media :23}     Zahra Gasca presents to [facility] for     Chief Complaint   Patient presents with   • Rash        Patient seen today for follow up.  She is postpartum, delivery on 3/7/2022.  Says that she is dealing with postpartum anxiety, and would like to consider medication options.  She is not breastfeeding.  Does have a few, tiny scattered skin lesions that have seemed to come up.  Not itchy or painful.  She has redness of her cheeks concerning for rosacea.      Current Outpatient Medications:   •  Prenatal Vit-Fe Fumarate-FA (PRENATAL, CLASSIC, VITAMIN) 28-0.8 MG tablet tablet, Take  by mouth Daily., Disp: , Rfl:   •  ferrous sulfate 324 (65 Fe) MG tablet delayed-release EC tablet, Take 1 tablet by mouth Daily With Breakfast., Disp: 30 tablet, Rfl: 1     Objective       Vital Signs:   /70   Pulse 95   Ht 160 cm (63\")   Wt 64.9 kg (143 lb)   SpO2 98%   BMI 25.33 kg/m²     Physical Exam  Vitals reviewed.   Constitutional:       General: She is not in acute distress.     Appearance: She is well-developed.   Cardiovascular:      Rate and Rhythm: Normal rate and regular rhythm.      Heart sounds: Normal heart sounds. No murmur heard.  Pulmonary:      Effort: Pulmonary effort is normal. No respiratory distress.      Breath sounds: Normal breath sounds. No wheezing or rales.   Abdominal:      Palpations: Abdomen is soft.      Tenderness: There is no abdominal tenderness.   Skin:     General: Skin is warm and dry.   Neurological:      Mental Status: She is alert and oriented to person, place, and time.   Psychiatric:         Mood and Affect: Mood is anxious.        Result Review :{ Labs  Result Review  Imaging  Med Tab  Media :23}   The following data was reviewed by: " Niecy Butler MD on 05/10/2022    Common labs    Common Labsle 3/1/22 3/7/22 3/8/22   WBC 8.42 8.45    Hemoglobin 10.9 (A) 10.9 (A) 10.2 (A)   Hematocrit 33.5 (A) 33.7 (A) 31.2 (A)   Platelets 180 168    (A) Abnormal value                      Assessment and Plan {CC Problem List  Visit Diagnosis  ROS  Review (Popup)  Health Maintenance  Quality  BestPractice  Medications  SmartSets  SnapShot Encounters  Media :23}   Diagnoses and all orders for this visit:    1. Anxiety (Primary)  -     escitalopram (Lexapro) 10 MG tablet; Take 1 tablet by mouth Daily.  Dispense: 30 tablet; Refill: 2    2. Postpartum state    3. Rosacea  -     metroNIDAZOLE (METROGEL) 0.75 % gel; Apply  topically to the appropriate area as directed 2 (Two) Times a Day.  Dispense: 45 g; Refill: 2    4. Cutaneous skin tags       Anxiety, worsened in postpartum state  Start Lexapro 10 mg daily  Risks and benefits of medication discussed  Metronidazole gel for rosacea  No treatment needed at this time for skin tags, flat not pedunculated and very small in size      Follow Up {Instructions Charge Capture  Follow-up Communications :23}   Return in about 6 weeks (around 6/21/2022) for Recheck.  Patient was given instructions and counseling regarding her condition or for health maintenance advice. Please see specific information pulled into the AVS if appropriate.        This document has been electronically signed by Niecy Butler MD       done

## 2022-05-11 ENCOUNTER — POSTPARTUM VISIT (OUTPATIENT)
Dept: OBSTETRICS AND GYNECOLOGY | Facility: CLINIC | Age: 37
End: 2022-05-11

## 2022-05-11 VITALS
DIASTOLIC BLOOD PRESSURE: 62 MMHG | WEIGHT: 142.4 LBS | HEIGHT: 63 IN | BODY MASS INDEX: 25.23 KG/M2 | SYSTOLIC BLOOD PRESSURE: 98 MMHG

## 2022-05-11 DIAGNOSIS — Z30.430 ENCOUNTER FOR INSERTION OF INTRAUTERINE CONTRACEPTIVE DEVICE (IUD): Primary | ICD-10-CM

## 2022-05-11 LAB
B-HCG UR QL: NEGATIVE
EXPIRATION DATE: NORMAL
INTERNAL NEGATIVE CONTROL: NEGATIVE
INTERNAL POSITIVE CONTROL: POSITIVE
Lab: NORMAL

## 2022-05-11 PROCEDURE — 81025 URINE PREGNANCY TEST: CPT | Performed by: STUDENT IN AN ORGANIZED HEALTH CARE EDUCATION/TRAINING PROGRAM

## 2022-05-11 PROCEDURE — 58300 INSERT INTRAUTERINE DEVICE: CPT | Performed by: STUDENT IN AN ORGANIZED HEALTH CARE EDUCATION/TRAINING PROGRAM

## 2022-05-11 PROCEDURE — 0503F POSTPARTUM CARE VISIT: CPT | Performed by: STUDENT IN AN ORGANIZED HEALTH CARE EDUCATION/TRAINING PROGRAM

## 2022-05-11 NOTE — PROGRESS NOTES
Postpartum visit      Zahra Gasca is a36 y.o.  s/p , Low Transverse on 3/7/2022 at 36w2d secondary to placenta previa after prolonged admission due to multiple bleeding episodes who presents today for IUD placement.  Presented to office   desiring IUD placement but due to recent intercouse appt cancelled and rescheduled. Negative UPT  and today.  Patient has been dealing with some anxiety postpartum. Started on Lexapro she thinks by Dr. Butler yesterday. No SI/HI.  Menstrual cycles have resumed, LMP .  Bottlefeeding.  Desires IUD for contraception.  She had come to see me two weeks ago and had reported recent intercourse. Today denies any intercourse since last seen. UPT negative. Denies bowel or bladder issues.    [facility]  Gynecology  Procedure Note: IUD Insertion    Patient's last menstrual period was 2022.    Date of procedure:  2022    Risks and benefits discussed? yes  All questions answered? yes  Consents given by the patient  Written consent obtained? yes    Local anesthesia used:  no    Procedure documentation:    A speculum was placed in order to view the cervix.  The cervix was cleansed with an antiseptic solution.  The anterior lip of the cervix was grasped with a tenaculum and the uterine cavity was gently sounded.  There was mild difficulty passing the sound through the cervix.  Cervical dilation did not need to be performed prior to pacing the IUD.  The uterus was anteverted and sounded to 8 cms.  The Mirena was then prepared per the manufacturers instructions.    The Mirena was advanced to a point 2 cms from the fundus and then the arms were released from the sheath.  The device was advanced to the fundus and the device was released fully from the sheath.. The string was cut 2 cms in length.  Bleeding from the cervix was scant.    She tolerated the procedure without any difficulty.     Mirena IUD information  Mirena NDC#  28677-743-03  Lot #: [id number]  Expiration date: 2024/August    Device supplied by the clinic.    Post procedure instructions:  Call if any fever or excessive bleeding or pain.    Follow up needed:  6 weeks string check            This document has been electronically signed by Noy Parra DO on May 11, 2022 09:58 CDT

## 2022-05-12 RX ORDER — METRONIDAZOLE 7.5 MG/G
GEL TOPICAL 2 TIMES DAILY
Qty: 45 G | Refills: 2 | Status: SHIPPED | OUTPATIENT
Start: 2022-05-12 | End: 2022-06-22

## 2022-06-02 ENCOUNTER — TELEPHONE (OUTPATIENT)
Dept: FAMILY MEDICINE CLINIC | Facility: CLINIC | Age: 37
End: 2022-06-02

## 2022-06-02 NOTE — TELEPHONE ENCOUNTER
PT WOULD LIKE TO SPEAK WITH ONE OF YOU REGARDING HER MEDICATION. SHE IS EXPERIENCING DIARRHEA AND DOESN'T FEEL LIKE IT IS HELPING.

## 2022-06-03 ENCOUNTER — TELEPHONE (OUTPATIENT)
Dept: FAMILY MEDICINE CLINIC | Facility: CLINIC | Age: 37
End: 2022-06-03

## 2022-06-03 RX ORDER — SERTRALINE HYDROCHLORIDE 25 MG/1
25 TABLET, FILM COATED ORAL DAILY
Qty: 30 TABLET | Refills: 2 | Status: SHIPPED | OUTPATIENT
Start: 2022-06-03 | End: 2022-08-29

## 2022-06-03 NOTE — TELEPHONE ENCOUNTER
Lexapro is causing her to be tired, have diarrhea and she hasn't noticed any change in mood since starting it. Wanting to change to a different medication.

## 2022-06-22 ENCOUNTER — OFFICE VISIT (OUTPATIENT)
Dept: OBSTETRICS AND GYNECOLOGY | Facility: CLINIC | Age: 37
End: 2022-06-22

## 2022-06-22 ENCOUNTER — OFFICE VISIT (OUTPATIENT)
Dept: FAMILY MEDICINE CLINIC | Facility: CLINIC | Age: 37
End: 2022-06-22

## 2022-06-22 VITALS
BODY MASS INDEX: 25.09 KG/M2 | HEIGHT: 63 IN | WEIGHT: 141.6 LBS | SYSTOLIC BLOOD PRESSURE: 102 MMHG | DIASTOLIC BLOOD PRESSURE: 64 MMHG

## 2022-06-22 VITALS
HEART RATE: 87 BPM | OXYGEN SATURATION: 97 % | SYSTOLIC BLOOD PRESSURE: 110 MMHG | WEIGHT: 139 LBS | HEIGHT: 63 IN | BODY MASS INDEX: 24.63 KG/M2 | DIASTOLIC BLOOD PRESSURE: 80 MMHG

## 2022-06-22 DIAGNOSIS — R45.4 IRRITABILITY: ICD-10-CM

## 2022-06-22 DIAGNOSIS — M25.511 CHRONIC PAIN OF BOTH SHOULDERS: Primary | ICD-10-CM

## 2022-06-22 DIAGNOSIS — M25.512 CHRONIC PAIN OF BOTH SHOULDERS: Primary | ICD-10-CM

## 2022-06-22 DIAGNOSIS — F41.9 ANXIETY: ICD-10-CM

## 2022-06-22 DIAGNOSIS — K59.00 CONSTIPATION, UNSPECIFIED CONSTIPATION TYPE: ICD-10-CM

## 2022-06-22 DIAGNOSIS — Z30.431 ENCOUNTER FOR ROUTINE CHECKING OF INTRAUTERINE CONTRACEPTIVE DEVICE (IUD): Primary | ICD-10-CM

## 2022-06-22 DIAGNOSIS — G89.29 CHRONIC PAIN OF BOTH SHOULDERS: Primary | ICD-10-CM

## 2022-06-22 PROCEDURE — 99213 OFFICE O/P EST LOW 20 MIN: CPT | Performed by: STUDENT IN AN ORGANIZED HEALTH CARE EDUCATION/TRAINING PROGRAM

## 2022-06-22 PROCEDURE — 99214 OFFICE O/P EST MOD 30 MIN: CPT | Performed by: FAMILY MEDICINE

## 2022-06-22 RX ORDER — BUPROPION HYDROCHLORIDE 150 MG/1
150 TABLET ORAL DAILY
Qty: 30 TABLET | Refills: 0 | Status: SHIPPED | OUTPATIENT
Start: 2022-06-22 | End: 2022-07-26

## 2022-06-22 RX ORDER — DOCUSATE SODIUM 100 MG/1
100 CAPSULE, LIQUID FILLED ORAL DAILY
Qty: 14 CAPSULE | Refills: 0 | Status: SHIPPED | OUTPATIENT
Start: 2022-06-22 | End: 2022-07-06

## 2022-06-23 ENCOUNTER — PATIENT MESSAGE (OUTPATIENT)
Dept: FAMILY MEDICINE CLINIC | Facility: CLINIC | Age: 37
End: 2022-06-23

## 2022-06-24 ENCOUNTER — TELEPHONE (OUTPATIENT)
Dept: FAMILY MEDICINE CLINIC | Facility: CLINIC | Age: 37
End: 2022-06-24

## 2022-06-24 DIAGNOSIS — M25.511 CHRONIC PAIN OF BOTH SHOULDERS: Primary | ICD-10-CM

## 2022-06-24 DIAGNOSIS — G89.29 CHRONIC PAIN OF BOTH SHOULDERS: Primary | ICD-10-CM

## 2022-06-24 DIAGNOSIS — M25.512 CHRONIC PAIN OF BOTH SHOULDERS: Primary | ICD-10-CM

## 2022-06-24 NOTE — TELEPHONE ENCOUNTER
Per Dr. Butler, Ms. Gasca has been called with recent Bilateral Shoulder x-ray results & recommendations.  Continue current medications and follow-up as planned or sooner if any problems.       ----- Message from Niecy Butler MD sent at 6/24/2022 12:02 AM CDT -----  Patient has degenerative changes of the shoulders bilaterally.  Referral placed to ortho.  HAI Hall

## 2022-07-11 ENCOUNTER — OFFICE VISIT (OUTPATIENT)
Dept: ORTHOPEDIC SURGERY | Facility: CLINIC | Age: 37
End: 2022-07-11

## 2022-07-11 VITALS — BODY MASS INDEX: 24.49 KG/M2 | WEIGHT: 138.2 LBS | HEIGHT: 63 IN

## 2022-07-11 DIAGNOSIS — M25.512 BILATERAL SHOULDER PAIN, UNSPECIFIED CHRONICITY: Primary | ICD-10-CM

## 2022-07-11 DIAGNOSIS — M19.011 PRIMARY OSTEOARTHRITIS OF RIGHT SHOULDER: ICD-10-CM

## 2022-07-11 DIAGNOSIS — M25.511 BILATERAL SHOULDER PAIN, UNSPECIFIED CHRONICITY: Primary | ICD-10-CM

## 2022-07-11 DIAGNOSIS — M19.012 PRIMARY OSTEOARTHRITIS OF LEFT SHOULDER: ICD-10-CM

## 2022-07-11 PROCEDURE — 20610 DRAIN/INJ JOINT/BURSA W/O US: CPT | Performed by: NURSE PRACTITIONER

## 2022-07-11 PROCEDURE — 99214 OFFICE O/P EST MOD 30 MIN: CPT | Performed by: NURSE PRACTITIONER

## 2022-07-11 RX ORDER — BUPIVACAINE HYDROCHLORIDE 5 MG/ML
2 INJECTION, SOLUTION PERINEURAL
Status: COMPLETED | OUTPATIENT
Start: 2022-07-11 | End: 2022-07-11

## 2022-07-11 RX ORDER — TRIAMCINOLONE ACETONIDE 40 MG/ML
40 INJECTION, SUSPENSION INTRA-ARTICULAR; INTRAMUSCULAR
Status: COMPLETED | OUTPATIENT
Start: 2022-07-11 | End: 2022-07-11

## 2022-07-11 RX ADMIN — TRIAMCINOLONE ACETONIDE 40 MG: 40 INJECTION, SUSPENSION INTRA-ARTICULAR; INTRAMUSCULAR at 10:13

## 2022-07-11 RX ADMIN — BUPIVACAINE HYDROCHLORIDE 2 ML: 5 INJECTION, SOLUTION PERINEURAL at 10:13

## 2022-07-11 NOTE — PROGRESS NOTES
Zahra Gasca is a 37 y.o. female   Primary provider:  Niecy Butler MD       Chief Complaint   Patient presents with   • Left Shoulder - Pain   • Right Shoulder - Pain       HISTORY OF PRESENT ILLNESS: This 37-year-old female patient presents today with complaints of bilateral shoulder pain.  This patient reports her pain has been ongoing for greater than 10 years.  The patient reports she has seen Dr. Muñoz in the past but has been more than 10 years.  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  The patient reports she has tried over-the-counter analgesics such as ibuprofen and acetaminophen, IcyHot, steroid injections x3, water therapy, TENS unit and physical therapy.  The patient reports these interventions did not improve her pain and it continues to worsen.  The patient states her left shoulder is usually worse than her right but today the right side is worse than the left.  Reports limited range of motion.  The patient reports her pain is severe, constant with grinding, aching, popping that is worse with certain motions.  Denies numbness and tingling.  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  The patient reports it is making it difficult to take care of her children.      Pain  This is a chronic problem. The current episode started more than 1 year ago. The problem occurs constantly. The problem has been gradually worsening. Associated symptoms include arthralgias, headaches, joint swelling and myalgias. The symptoms are aggravated by exertion. Treatments tried: Injections. The treatment provided significant relief.        CONCURRENT MEDICAL HISTORY:    Past Medical History:   Diagnosis Date   • Abnormal Pap smear of cervix    • Anxiety    • History of pre-eclampsia 07/02/2020    w/ severe features, pulmonary edema   • Nephrolithiasis 11/18/2021   •  Placenta previa antepartum 2022   • Varicella        No Known Allergies      Current Outpatient Medications:   •  buPROPion XL (Wellbutrin XL) 150 MG 24 hr tablet, Take 1 tablet by mouth Daily., Disp: 30 tablet, Rfl: 0  •  sertraline (Zoloft) 25 MG tablet, Take 1 tablet by mouth Daily., Disp: 30 tablet, Rfl: 2    Past Surgical History:   Procedure Laterality Date   •  SECTION N/A 2020    Procedure:  SECTION PRIMARY;  Surgeon: Alysha Curran MD;  Location: [facility] LABOR DELIVERY;  Service: Obstetrics;  Laterality: N/A;   •  SECTION N/A 3/7/2022    Procedure: Repeat  section;  Surgeon: Alysha Curran MD;  Location: [facility] LABOR DELIVERY;  Service: Obstetrics;  Laterality: N/A;       Family History   Problem Relation Age of Onset   • Heart disease Father    • Diabetes Father    • Heart attack Father    • Coronary artery disease Father    • No Known Problems Brother    • Cancer Paternal Grandfather    • Heart disease Maternal Grandfather    • Heart attack Maternal Grandfather    • No Known Problems Daughter         Social History     Socioeconomic History   • Marital status:    Tobacco Use   • Smoking status: Former Smoker   • Smokeless tobacco: Never Used   • Tobacco comment: quit 10 years ago   Substance and Sexual Activity   • Alcohol use: No   • Drug use: No   • Sexual activity: Yes     Partners: Male     Comment: last pap smear 10/29/20 negative, HPV negative         Review of Systems   Constitutional: Negative.    Eyes: Negative.    Respiratory: Negative.    Cardiovascular: Negative.    Gastrointestinal: Positive for diarrhea.   Endocrine: Positive for cold intolerance and heat intolerance.   Genitourinary: Negative.    Musculoskeletal: Positive for arthralgias, joint swelling and myalgias.        Stiffness   Skin: Negative.    Allergic/Immunologic: Negative.    Neurological: Positive for headaches.   Hematological: Negative.   "  Psychiatric/Behavioral:        Anxiety/Depression       PHYSICAL EXAMINATION:       Ht 160 cm (63\")   Wt 62.7 kg (138 lb 3.2 oz)   BMI 24.48 kg/m²     Physical Exam    GAIT:     []  Normal  []  Antalgic    Assistive device: []  None  []  Walker     []  Crutches  []  Cane     []  Wheelchair  []  Stretcher    Right Shoulder Exam     Tenderness   Right shoulder tenderness location: Diffuse tenderness.    Tests   Cross arm: positive  Impingement: positive    Other   Erythema: absent  Scars: absent  Sensation: normal  Pulse: present    Comments:  Positive crepitus.  Positive empty and full can test.  Pain worse with internal rotation behind the back and extension.  Strength good and equal bilaterally      Left Shoulder Exam     Tenderness   Left shoulder tenderness location: Mild diffuse tenderness.    Tests   Cross arm: positive    Other   Erythema: absent  Scars: absent  Sensation: normal  Pulse: present     Comments:  Positive empty and full can test.  Strength good and equal bilaterally.              XR Shoulder 2+ View Bilateral    Result Date: 6/22/2022  Narrative: Three view bilateral shoulders HISTORY: Chronic bilateral shoulder pain. Bilateral shoulder pain x10 years. AP films with the humerus in internal and external rotation and scapular Y view of each shoulder obtained. COMPARISON: January 4, 2012 FINDINGS: No fracture or dislocation. Degenerative cystic change each humeral head. Stable minimal widening of the left acromioclavicular joint. No other osseous or articular abnormality.     Impression: CONCLUSION: Degenerative cystic change each humeral head. Stable minimal widening of the left acromioclavicular joint. [id number] Electronically signed by:  Jose Luis Hernandez MD  6/22/2022 12:52 PM CDT Workstation: [id number]          ASSESSMENT:    Diagnoses and all orders for this visit:    Bilateral shoulder pain, unspecified chronicity  -     Large Joint Arthrocentesis: R subacromial bursa  -     Large Joint " Arthrocentesis: L subacromial bursa  -     MRI Shoulder Left Without Contrast; Future  -     MRI Shoulder Right Without Contrast; Future    Primary osteoarthritis of left shoulder  -     Large Joint Arthrocentesis: L subacromial bursa  -     MRI Shoulder Left Without Contrast; Future    Primary osteoarthritis of right shoulder  -     Large Joint Arthrocentesis: R subacromial bursa  -     MRI Shoulder Right Without Contrast; Future          PLAN    Large Joint Arthrocentesis: R subacromial bursa  Date/Time: 7/11/2022 10:13 AM  Consent given by: patient  Site marked: site marked  Timeout: Immediately prior to procedure a time out was called to verify the correct patient, procedure, equipment, support staff and site/side marked as required   Supporting Documentation  Indications: pain   Procedure Details  Location: shoulder - R subacromial bursa  Preparation: Patient was prepped and draped in the usual sterile fashion  Needle size: 22 G  Approach: posterior  Medications administered: 40 mg triamcinolone acetonide 40 MG/ML; 2 mL bupivacaine 0.5 %  Patient tolerance: patient tolerated the procedure well with no immediate complications    Large Joint Arthrocentesis: L subacromial bursa  Date/Time: 7/11/2022 10:13 AM  Consent given by: patient  Site marked: site marked  Timeout: Immediately prior to procedure a time out was called to verify the correct patient, procedure, equipment, support staff and site/side marked as required   Supporting Documentation  Indications: pain   Procedure Details  Location: shoulder - L subacromial bursa  Preparation: Patient was prepped and draped in the usual sterile fashion  Needle size: 22 G  Approach: posterior  Medications administered: 40 mg triamcinolone acetonide 40 MG/ML; 2 mL bupivacaine 0.5 %  Patient tolerance: patient tolerated the procedure well with no immediate complications      Reviewed xray of bilateral shoulders from 6/22/2022.   Discussed risk and benefits of an  intra-articular injection of steroid, the patient verbalized understanding and wishes to proceed with the injection.  Discussed with patient we can try conservative measures however, this patient reports she has tried all conservative measures with no relief of pain.  The patient reports her pain is only getting worse.  Discussed with patient I could order an MRI and the patient wishes to order an MRI to determine if there is any injury not seen on the x-ray that may be causing her pain.  This patient reports her pain is interrupting her daily activity and make it difficult to care for her children.  Recommended patient modify her activity based on pain as best she can.  Recommend the patient have a vitamin D and calcium labs as she is just a few months postpartum.  Will order vitamin D and calcium lab, will notify patient of results when available.  Order for right and left shoulder MRI without contrast placed, advised patient to follow-up for MRI results.  Discussed with patient they may deny the order because she has not had physical therapy recently.  The patient reports that she has had PT several times that did not improve her pain.  The patient also reports she has had steroid injections, water therapy therapy and TENS unit therapy with no improvement.  Recommended home exercises for strengthening.  Advised to follow-up for MRI results.  May follow-up sooner as needed if symptoms change or worsen.  Offered to order Voltaren gel however, the patient reports she does not like the gels, she would rather have a cream.  We discussed she could try topical analgesics and see if she can find a cream over-the-counter that she prefers.    All questions and concerns are addressed with understanding noted. They are aware and are in agreement to this plan.    Return for To review MRI of the bilateral shoulders.    JOANNA Yeh    This document has been electronically signed by JOANNA Yeh on July  11, 2022 13:35 CDT      EMR Dragon/Transciption Disclaimer: Some of this note may be an electronic transcription/translation of spoken language to printed text using the Dragon Dictation System.     Time spent of a minimum of 30 minutes including the face to face evaluation, reviewing of medical history and prior medial records, reviewing of diagnostic studies, ordering additional tests, documentation, patient education and coordination of care.

## 2022-07-25 ENCOUNTER — HOSPITAL ENCOUNTER (OUTPATIENT)
Dept: MRI IMAGING | Facility: HOSPITAL | Age: 37
Discharge: HOME | End: 2022-07-25

## 2022-07-25 DIAGNOSIS — M25.511 BILATERAL SHOULDER PAIN, UNSPECIFIED CHRONICITY: ICD-10-CM

## 2022-07-25 DIAGNOSIS — M19.012 PRIMARY OSTEOARTHRITIS OF LEFT SHOULDER: ICD-10-CM

## 2022-07-25 DIAGNOSIS — R45.4 IRRITABILITY: ICD-10-CM

## 2022-07-25 DIAGNOSIS — F41.9 ANXIETY: ICD-10-CM

## 2022-07-25 DIAGNOSIS — M25.512 BILATERAL SHOULDER PAIN, UNSPECIFIED CHRONICITY: ICD-10-CM

## 2022-07-25 DIAGNOSIS — M19.011 PRIMARY OSTEOARTHRITIS OF RIGHT SHOULDER: ICD-10-CM

## 2022-07-25 PROCEDURE — 73221 MRI JOINT UPR EXTREM W/O DYE: CPT

## 2022-07-26 DIAGNOSIS — F41.9 ANXIETY: ICD-10-CM

## 2022-07-26 DIAGNOSIS — R45.4 IRRITABILITY: ICD-10-CM

## 2022-07-26 RX ORDER — BUPROPION HYDROCHLORIDE 150 MG/1
TABLET ORAL
Qty: 30 TABLET | Refills: 1 | Status: SHIPPED | OUTPATIENT
Start: 2022-07-26 | End: 2022-08-29

## 2022-07-26 RX ORDER — BUPROPION HYDROCHLORIDE 150 MG/1
150 TABLET ORAL DAILY
Qty: 30 TABLET | Refills: 0 | Status: CANCELLED | OUTPATIENT
Start: 2022-07-26

## 2022-07-27 ENCOUNTER — TELEPHONE (OUTPATIENT)
Dept: ORTHOPEDIC SURGERY | Facility: CLINIC | Age: 37
End: 2022-07-27

## 2022-07-27 NOTE — TELEPHONE ENCOUNTER
JOSE,    Patient wants Jose to call her and go over the MRI results over the phone, because she can not come into office because of her kids. Call back at [phone]

## 2022-07-28 NOTE — TELEPHONE ENCOUNTER
Left voicemail on her personalized VM.   Reviewed results, offered to order PT, if patient calls back and wishes to attend PT, please order.   Also recommended continue modified activity. Rest. Ice.   Advised to call with any questions.   She may follow up as needed.    ThanksAshleigh

## 2022-08-01 ENCOUNTER — TELEPHONE (OUTPATIENT)
Dept: FAMILY MEDICINE CLINIC | Facility: CLINIC | Age: 37
End: 2022-08-01

## 2022-08-01 NOTE — TELEPHONE ENCOUNTER
Patient called stating she has red bumps on her right and left knee, pt said they are getting bigger, red and spreading. Patient said she also has really dry cracked hands. She was wanting to know if she could be seen through a video visit or if something could be called in for her. Please advise [phone]     [facility]

## 2022-08-02 ENCOUNTER — OFFICE VISIT (OUTPATIENT)
Dept: FAMILY MEDICINE CLINIC | Facility: CLINIC | Age: 37
End: 2022-08-02

## 2022-08-02 VITALS
BODY MASS INDEX: 24.26 KG/M2 | DIASTOLIC BLOOD PRESSURE: 64 MMHG | HEART RATE: 89 BPM | WEIGHT: 136.9 LBS | HEIGHT: 63 IN | SYSTOLIC BLOOD PRESSURE: 112 MMHG | RESPIRATION RATE: 22 BRPM | OXYGEN SATURATION: 98 %

## 2022-08-02 DIAGNOSIS — R21 RASH: Primary | ICD-10-CM

## 2022-08-02 DIAGNOSIS — L85.3 DRY SKIN: ICD-10-CM

## 2022-08-02 PROCEDURE — 99214 OFFICE O/P EST MOD 30 MIN: CPT | Performed by: NURSE PRACTITIONER

## 2022-08-02 PROCEDURE — 96372 THER/PROPH/DIAG INJ SC/IM: CPT | Performed by: NURSE PRACTITIONER

## 2022-08-02 RX ORDER — PETROLATUM 46.5 G/100G
1 OINTMENT TOPICAL AS NEEDED
Qty: 340 G | Refills: 3 | Status: SHIPPED | OUTPATIENT
Start: 2022-08-02

## 2022-08-02 RX ORDER — TRIAMCINOLONE ACETONIDE 40 MG/ML
80 INJECTION, SUSPENSION INTRA-ARTICULAR; INTRAMUSCULAR ONCE
Status: COMPLETED | OUTPATIENT
Start: 2022-08-02 | End: 2022-08-02

## 2022-08-02 RX ADMIN — TRIAMCINOLONE ACETONIDE 80 MG: 40 INJECTION, SUSPENSION INTRA-ARTICULAR; INTRAMUSCULAR at 10:51

## 2022-08-02 NOTE — PROGRESS NOTES
"Chief Complaint  Rash (Knees and elbow)    Subjective          Zahra Gasca presents to [facility] [place] with concerns of a rash. The rash came up about 2-3 days ago and is itching, she has been using OTC hydrocortisone cream, however it doesn't seem to be helping much. She can not think of anything different she would have came into contact with to cause rash.  She is also having issues with dry skin on her hands and is wanting recommendations on something to help.       Rash  This is a new problem. The current episode started in the past 7 days. The problem is unchanged. The affected locations include the left upper leg, right upper leg, left elbow and right elbow. The rash is characterized by itchiness and redness. It is unknown if there was an exposure to a precipitant. Past treatments include topical steroids. The treatment provided mild relief.     Outpatient Medications Prior to Visit   Medication Sig Dispense Refill   • buPROPion XL (WELLBUTRIN XL) 150 MG 24 hr tablet Take 1 tablet by mouth once daily 30 tablet 1   • sertraline (Zoloft) 25 MG tablet Take 1 tablet by mouth Daily. 30 tablet 2     No facility-administered medications prior to visit.       Review of Systems   Skin: Positive for rash.         Objective   Vital Signs:   Visit Vitals  /64 (BP Location: Left arm, Patient Position: Sitting, Cuff Size: Adult)   Pulse 89   Resp 22   Ht 160 cm (63\")   Wt 62.1 kg (136 lb 14.4 oz)   SpO2 98%   BMI 24.25 kg/m²     Physical Exam  Vitals and nursing note reviewed.   Constitutional:       Appearance: She is well-developed.   HENT:      Head: Normocephalic and atraumatic.   Eyes:      General: Lids are normal.      Conjunctiva/sclera: Conjunctivae normal.   Neck:      Thyroid: No thyroid mass or thyromegaly.      Trachea: Trachea normal. No tracheal tenderness.   Cardiovascular:      Rate and Rhythm: Normal rate.      Pulses: Normal pulses.      " Heart sounds: Normal heart sounds.   Pulmonary:      Effort: Pulmonary effort is normal. No respiratory distress.      Breath sounds: Normal breath sounds. No wheezing.   Abdominal:      General: There is no distension.      Palpations: Abdomen is soft. There is no mass.   Musculoskeletal:         General: Normal range of motion.      Cervical back: Normal range of motion. No edema.   Lymphadenopathy:      Head:      Right side of head: No submental, submandibular or tonsillar adenopathy.      Left side of head: No submental, submandibular or tonsillar adenopathy.   Skin:     General: Skin is warm and dry.      Coloration: Skin is not pale.      Findings: Erythema and rash present. No abrasion or lesion. Rash is macular.             Comments: Hands- dry skin     Rash is on elbows, knees and back of right knee   Neurological:      Mental Status: She is alert and oriented to person, place, and time.   Psychiatric:         Mood and Affect: Mood is not anxious. Affect is not inappropriate.         Speech: Speech normal.         Behavior: Behavior normal.         Thought Content: Thought content normal.         Judgment: Judgment normal. Judgment is not impulsive.        Result Review :                 Assessment and Plan    Diagnoses and all orders for this visit:    1. Rash (Primary)  -     triamcinolone acetonide (KENALOG-40) injection 80 mg  -     hydrocortisone 2.5 % cream; Apply 1 application topically to the appropriate area as directed 2 (Two) Times a Day.  Dispense: 28 g; Refill: 1    2. Dry skin  -     Skin Protectants, Misc. (CeraVe) ointment; Apply 1 application topically As Needed (for skin dryness).  Dispense: 340 g; Refill: 3      Kenalog injection today in the office  RX sent in for hydrocortisone cream     Please call the office if your have any issues or worsening symptoms           Follow Up   Return if symptoms worsen or fail to improve, for Next scheduled follow up.  Patient was given instructions  and counseling regarding her condition or for health maintenance advice. Please see specific information pulled into the AVS if appropriate.           This document has been electronically signed by JOANNA Rose on August 2, 2022 11:14 CDT

## 2022-08-05 ENCOUNTER — TELEPHONE (OUTPATIENT)
Dept: FAMILY MEDICINE CLINIC | Facility: CLINIC | Age: 37
End: 2022-08-05

## 2022-08-05 RX ORDER — DIPHENHYDRAMINE HCL 25 MG
25 TABLET ORAL EVERY 6 HOURS PRN
Qty: 40 TABLET | Refills: 1 | Status: SHIPPED | OUTPATIENT
Start: 2022-08-05 | End: 2022-08-15

## 2022-08-05 NOTE — TELEPHONE ENCOUNTER
DR. MARRERO PT SEEN ANA ON 8/2      Patient called stating she has sent a message on [employer] yesterday and hasn't heard back. Patient says the rash is spreading to her buttock and also itching on her chest and side. Patient said she has a red line across her right side but it doesn't look like the rash. Patient said the cream has helped with the itching but it seems to be spreading instead of going away. Please call [phone]

## 2022-08-05 NOTE — TELEPHONE ENCOUNTER
Spoke to patient.  Try ice packs, benadryl and topical corticosteroid.  Given information about when rash may need to be seen.  Offered appointment at 11:45 AM on Monday 8/8/22 if needed - she will call if needed.  ThanksHAI

## 2022-08-29 ENCOUNTER — OFFICE VISIT (OUTPATIENT)
Dept: FAMILY MEDICINE CLINIC | Facility: CLINIC | Age: 37
End: 2022-08-29

## 2022-08-29 VITALS
WEIGHT: 139 LBS | HEART RATE: 80 BPM | DIASTOLIC BLOOD PRESSURE: 80 MMHG | OXYGEN SATURATION: 97 % | HEIGHT: 63 IN | BODY MASS INDEX: 24.63 KG/M2 | SYSTOLIC BLOOD PRESSURE: 112 MMHG

## 2022-08-29 DIAGNOSIS — R10.11 RUQ PAIN: Primary | ICD-10-CM

## 2022-08-29 DIAGNOSIS — F42.9 OBSESSIVE-COMPULSIVE DISORDER, UNSPECIFIED TYPE: ICD-10-CM

## 2022-08-29 PROCEDURE — 99213 OFFICE O/P EST LOW 20 MIN: CPT | Performed by: FAMILY MEDICINE

## 2022-08-30 PROBLEM — F42.9 OBSESSIVE-COMPULSIVE DISORDER: Status: ACTIVE | Noted: 2022-08-30

## 2022-09-08 PROCEDURE — 87635 SARS-COV-2 COVID-19 AMP PRB: CPT | Performed by: NURSE PRACTITIONER

## 2022-09-09 ENCOUNTER — TELEPHONE (OUTPATIENT)
Dept: FAMILY MEDICINE CLINIC | Facility: CLINIC | Age: 37
End: 2022-09-09

## 2022-09-09 DIAGNOSIS — U07.1 COVID-19 VIRUS DETECTED: Primary | ICD-10-CM

## 2022-09-09 RX ORDER — GUAIFENESIN 600 MG/1
600 TABLET, EXTENDED RELEASE ORAL 2 TIMES DAILY
Qty: 20 TABLET | Refills: 0 | Status: SHIPPED | OUTPATIENT
Start: 2022-09-09 | End: 2022-09-19

## 2022-09-09 RX ORDER — BENZONATATE 100 MG/1
100 CAPSULE ORAL 3 TIMES DAILY PRN
Qty: 30 CAPSULE | Refills: 0 | Status: SHIPPED | OUTPATIENT
Start: 2022-09-09 | End: 2022-09-19

## 2022-09-09 NOTE — TELEPHONE ENCOUNTER
Please let them know that I have sent prescription for mucinex and cough medication. Should rest and drink plenty of water.  Can use tylenol as needed for fever and body aches.  Not recommended to have paxlovid with her young age and no chronic health problems, medication indicated for those with risk factors (older age, chronic medical problems).  Should be seen in ER with any difficulty breathing.  Thanks, HAI Butler

## 2022-09-09 NOTE — TELEPHONE ENCOUNTER
I have left message on [employer] to call me back.   I also sent Dr. Butler's recommendations via her [employer] account.

## 2022-09-09 NOTE — TELEPHONE ENCOUNTER
Patient called and was diagnosed with Covid yesterday. She wanted to see if you could call in [facility] to [facility] in [place]. Phone is [phone].

## 2022-09-16 ENCOUNTER — APPOINTMENT (OUTPATIENT)
Dept: ULTRASOUND IMAGING | Facility: HOSPITAL | Age: 37
End: 2022-09-16

## 2022-10-18 ENCOUNTER — TELEPHONE (OUTPATIENT)
Dept: FAMILY MEDICINE CLINIC | Facility: CLINIC | Age: 37
End: 2022-10-18

## 2022-10-18 NOTE — TELEPHONE ENCOUNTER
Pt needs to know if Dr Phillip can order lab work to make sure her hormones are ok not out of balance .  thought her hormones were out of balance she doesn't have a period anymore and still having side pain and she would like to go ahead and get US of her side now too . Low lobedo mood swings  says can be severe and has lost weight   [phone] pt

## 2022-10-24 ENCOUNTER — TELEPHONE (OUTPATIENT)
Dept: FAMILY MEDICINE CLINIC | Facility: CLINIC | Age: 37
End: 2022-10-24

## 2022-10-24 NOTE — TELEPHONE ENCOUNTER
Needs visit next week - please schedule.  Labs can be checked after accordingly.  Thanks, HAI Butler

## 2022-10-24 NOTE — TELEPHONE ENCOUNTER
Pt needs to know if Dr Butler can order lab work to make sure her hormones are ok not out of balance . Wife said  thought her hormones were out of balance she doesn't have a period anymore and still having side pain and she would like to go ahead and get US of her side now too . Low lobedo mood swings  says can be severe and has lost weight and may have post partum has a almost 8 mo and 2 yr old   [phone] pt

## 2022-10-28 ENCOUNTER — TELEPHONE (OUTPATIENT)
Dept: FAMILY MEDICINE CLINIC | Facility: CLINIC | Age: 37
End: 2022-10-28

## 2022-10-28 NOTE — TELEPHONE ENCOUNTER
I called patient and explained to her that she needed an apt and you were willing to do a doxi visit. She said, she can't afford a visit and wanted me to ask if she can  do some labs and then if there is an issue,  do a visit?

## 2022-10-28 NOTE — TELEPHONE ENCOUNTER
There is nothing we are going to do if hormone levels are off, so there is generally not a reason to do these labs.  There are other things that can cause some of the symptoms she was describing.  In order to best assess her, there needs to be a more detailed discussion of her symptoms so that the appropriate lab work can be ordered.  Please ask that she let us know when she is ready to schedule an appt.  ThanksHAI

## 2022-10-28 NOTE — TELEPHONE ENCOUNTER
Needs appointment.  Can be video visit if she has trouble coming to the office/getting sitter.  Thanks, HAI Butler

## 2022-10-28 NOTE — TELEPHONE ENCOUNTER
Pt just wants the hormone lab work ordered before having an appt  and says that is all that is going on right now  and says she doesn't really need an  appt unless you  see in the  labs show hormone issue and then go from there  And really hard to get a  for 2 little ones so pt cancelled appt until you see if it is necessary   Pt [phone]

## 2022-11-07 ENCOUNTER — HOSPITAL ENCOUNTER (OUTPATIENT)
Dept: ULTRASOUND IMAGING | Facility: HOSPITAL | Age: 37
Discharge: HOME | End: 2022-11-07
Admitting: FAMILY MEDICINE

## 2022-11-07 DIAGNOSIS — R10.11 RUQ PAIN: ICD-10-CM

## 2022-11-07 PROCEDURE — 76705 ECHO EXAM OF ABDOMEN: CPT

## 2022-11-08 ENCOUNTER — TELEMEDICINE (OUTPATIENT)
Dept: FAMILY MEDICINE CLINIC | Facility: CLINIC | Age: 37
End: 2022-11-08

## 2022-11-08 VITALS — BODY MASS INDEX: 23.74 KG/M2 | HEIGHT: 63 IN

## 2022-11-08 DIAGNOSIS — R10.11 RUQ PAIN: ICD-10-CM

## 2022-11-08 DIAGNOSIS — R35.89 POLYURIA: ICD-10-CM

## 2022-11-08 DIAGNOSIS — K59.00 CONSTIPATION, UNSPECIFIED CONSTIPATION TYPE: ICD-10-CM

## 2022-11-08 DIAGNOSIS — Z86.32 HISTORY OF GESTATIONAL DIABETES MELLITUS: ICD-10-CM

## 2022-11-08 DIAGNOSIS — F41.9 ANXIETY: Primary | ICD-10-CM

## 2022-11-08 PROCEDURE — 99214 OFFICE O/P EST MOD 30 MIN: CPT | Performed by: FAMILY MEDICINE

## 2022-11-08 NOTE — PROGRESS NOTES
"Chief Complaint  Weight Loss and Constipation    Subjective    History of Present Illness {CC  Problem List  Visit  Diagnosis   Encounters  Notes  Medications  Labs  Result Review Imaging  Media :23}     Mode of Visit: Video  Location of patient: home  Location of provider: Clinic  You have chosen to receive care through a telehealth visit: yes  The visit included audio and video interaction. No technical issues occurred during this visit.      Zahra Gasca presents to [facility] [place] for     Chief Complaint   Patient presents with   • Weight Loss   • Constipation      Patient seen today for follow up.  Notes that she has not been doing well.  Still having side pain that is not getting better.  She had ultrasound for evaluation yesterday.  Pain is intermittent.   Associated constipation for a long stretch of time, then diarrhea that is just now getting back to normal.  No vomiting.  Does have increased urination.  She has also been experiencing low libido and mood swings. Has known anxiety, tried several medications but unable to tolerate.  She does not like taking any medication daily.        Current Outpatient Medications:   •  Skin Protectants, Misc. (CeraVe) ointment, Apply 1 application topically As Needed (for skin dryness)., Disp: 340 g, Rfl: 3     Objective       Vital Signs:   Ht 160 cm (63\")   BMI 23.74 kg/m²     Physical Exam  Constitutional:       General: She is not in acute distress.     Appearance: She is well-developed.   HENT:      Head: Normocephalic and atraumatic.   Pulmonary:      Effort: Pulmonary effort is normal. No respiratory distress.   Neurological:      Mental Status: She is alert and oriented to person, place, and time.        Result Review :{ Labs  Result Review  Imaging  Med Tab  Media :23}   The following data was reviewed by: Niecy Butler MD on 11/08/2022    Common labs    Common Labs 3/1/22 3/7/22 3/8/22 "   WBC 8.42 8.45    Hemoglobin 10.9 (A) 10.9 (A) 10.2 (A)   Hematocrit 33.5 (A) 33.7 (A) 31.2 (A)   Platelets 180 168    (A) Abnormal value                    Assessment and Plan {CC Problem List  Visit Diagnosis  ROS  Review (Popup)  Health Maintenance  Quality  BestPractice  Medications  SmartSets  SnapShot Encounters  Media :23}   Diagnoses and all orders for this visit:    1. Anxiety (Primary)  -     TSH; Future  -     Vitamin B12; Future  -     Vitamin D,25-Hydroxy; Future  -     CBC & Differential; Future  -     Comprehensive Metabolic Panel; Future    2. Constipation, unspecified constipation type    3. History of gestational diabetes mellitus  -     Hemoglobin A1c; Future    4. Polyuria  -     Hemoglobin A1c; Future    5. RUQ pain  -     NM HIDA SCAN WITHOUT PHARMACOLOGICAL INTERVENTION; Future       Anxiety not well controlled  Patient has not tolerated SSRI medications that she has tried in the past  Does not desire any medication at this time  Check labs as above, will call with results  Discuss management of diarrhea with diet and OTC medications if needed  HgbA1c due to polyuria  RUQ ultrasound did not reveal any abnormalities - will check gall bladder function with HIDA scan      Spent a total of 20 minutes conducting video visit and chart completion for this encounter.      Follow Up {Instructions Charge Capture  Follow-up Communications :23}   Return in about 3 months (around 2/8/2023), or if symptoms worsen or fail to improve, for Recheck.  Patient was given instructions and counseling regarding her condition or for health maintenance advice. Please see specific information pulled into the AVS if appropriate.        This document has been electronically signed by Niecy Butler MD

## 2022-11-09 ENCOUNTER — TELEPHONE (OUTPATIENT)
Dept: FAMILY MEDICINE CLINIC | Facility: CLINIC | Age: 37
End: 2022-11-09

## 2022-11-09 NOTE — PROGRESS NOTES
Per Dr. Butler, Ms. Gasca has been called with recent Abdomen US results & recommendations.  Continue current medications and follow-up as planned or sooner if any problems.

## 2022-11-09 NOTE — TELEPHONE ENCOUNTER
Per Dr. Butler, Ms. Gasca has been called with recent Abdomen US results & recommendations.  Continue current medications and follow-up as planned or sooner if any problems.       ----- Message from Niecy Butler MD sent at 11/8/2022 12:47 PM CST -----  Normal ultrasound.  - HAI Butler

## 2022-11-14 ENCOUNTER — LAB (OUTPATIENT)
Dept: LAB | Facility: HOSPITAL | Age: 37
End: 2022-11-14

## 2022-11-14 DIAGNOSIS — Z86.32 HISTORY OF GESTATIONAL DIABETES MELLITUS: ICD-10-CM

## 2022-11-14 DIAGNOSIS — F41.9 ANXIETY: ICD-10-CM

## 2022-11-14 DIAGNOSIS — R35.89 POLYURIA: ICD-10-CM

## 2022-11-14 LAB — HBA1C MFR BLD: 5.4 % (ref 4.8–5.6)

## 2022-11-14 PROCEDURE — 36415 COLL VENOUS BLD VENIPUNCTURE: CPT

## 2022-11-14 PROCEDURE — 80050 GENERAL HEALTH PANEL: CPT

## 2022-11-14 PROCEDURE — 82306 VITAMIN D 25 HYDROXY: CPT

## 2022-11-14 PROCEDURE — 82607 VITAMIN B-12: CPT

## 2022-11-14 PROCEDURE — 83036 HEMOGLOBIN GLYCOSYLATED A1C: CPT

## 2022-11-15 LAB
25(OH)D3 SERPL-MCNC: 12.2 NG/ML (ref 30–100)
ALBUMIN SERPL-MCNC: 4.8 G/DL (ref 3.5–5.2)
ALBUMIN/GLOB SERPL: 2.4 G/DL
ALP SERPL-CCNC: 72 U/L (ref 39–117)
ALT SERPL W P-5'-P-CCNC: 9 U/L (ref 1–33)
ANION GAP SERPL CALCULATED.3IONS-SCNC: 10 MMOL/L (ref 5–15)
AST SERPL-CCNC: 15 U/L (ref 1–32)
BASOPHILS # BLD AUTO: 0.02 10*3/MM3 (ref 0–0.2)
BASOPHILS NFR BLD AUTO: 0.4 % (ref 0–1.5)
BILIRUB SERPL-MCNC: 0.6 MG/DL (ref 0–1.2)
BUN SERPL-MCNC: 9 MG/DL (ref 6–20)
BUN/CREAT SERPL: 9.9 (ref 7–25)
CALCIUM SPEC-SCNC: 9.7 MG/DL (ref 8.6–10.5)
CHLORIDE SERPL-SCNC: 106 MMOL/L (ref 98–107)
CO2 SERPL-SCNC: 26 MMOL/L (ref 22–29)
CREAT SERPL-MCNC: 0.91 MG/DL (ref 0.57–1)
DEPRECATED RDW RBC AUTO: 39.7 FL (ref 37–54)
EGFRCR SERPLBLD CKD-EPI 2021: 83.5 ML/MIN/1.73
EOSINOPHIL # BLD AUTO: 0.04 10*3/MM3 (ref 0–0.4)
EOSINOPHIL NFR BLD AUTO: 0.8 % (ref 0.3–6.2)
ERYTHROCYTE [DISTWIDTH] IN BLOOD BY AUTOMATED COUNT: 12.9 % (ref 12.3–15.4)
GLOBULIN UR ELPH-MCNC: 2 GM/DL
GLUCOSE SERPL-MCNC: 92 MG/DL (ref 65–99)
HCT VFR BLD AUTO: 42.4 % (ref 34–46.6)
HGB BLD-MCNC: 14.3 G/DL (ref 12–15.9)
IMM GRANULOCYTES # BLD AUTO: 0.01 10*3/MM3 (ref 0–0.05)
IMM GRANULOCYTES NFR BLD AUTO: 0.2 % (ref 0–0.5)
LYMPHOCYTES # BLD AUTO: 1.33 10*3/MM3 (ref 0.7–3.1)
LYMPHOCYTES NFR BLD AUTO: 25.2 % (ref 19.6–45.3)
MCH RBC QN AUTO: 28.5 PG (ref 26.6–33)
MCHC RBC AUTO-ENTMCNC: 33.7 G/DL (ref 31.5–35.7)
MCV RBC AUTO: 84.6 FL (ref 79–97)
MONOCYTES # BLD AUTO: 0.38 10*3/MM3 (ref 0.1–0.9)
MONOCYTES NFR BLD AUTO: 7.2 % (ref 5–12)
NEUTROPHILS NFR BLD AUTO: 3.5 10*3/MM3 (ref 1.7–7)
NEUTROPHILS NFR BLD AUTO: 66.2 % (ref 42.7–76)
NRBC BLD AUTO-RTO: 0 /100 WBC (ref 0–0.2)
PLATELET # BLD AUTO: 293 10*3/MM3 (ref 140–450)
PMV BLD AUTO: 11.5 FL (ref 6–12)
POTASSIUM SERPL-SCNC: 4.4 MMOL/L (ref 3.5–5.2)
PROT SERPL-MCNC: 6.8 G/DL (ref 6–8.5)
RBC # BLD AUTO: 5.01 10*6/MM3 (ref 3.77–5.28)
SODIUM SERPL-SCNC: 142 MMOL/L (ref 136–145)
TSH SERPL DL<=0.05 MIU/L-ACNC: 1.06 UIU/ML (ref 0.27–4.2)
VIT B12 BLD-MCNC: 681 PG/ML (ref 211–946)
WBC NRBC COR # BLD: 5.28 10*3/MM3 (ref 3.4–10.8)

## 2022-11-16 ENCOUNTER — PATIENT MESSAGE (OUTPATIENT)
Dept: FAMILY MEDICINE CLINIC | Facility: CLINIC | Age: 37
End: 2022-11-16

## 2022-11-17 RX ORDER — ERGOCALCIFEROL 1.25 MG/1
50000 CAPSULE ORAL WEEKLY
Qty: 4 CAPSULE | Refills: 2 | Status: SHIPPED | OUTPATIENT
Start: 2022-11-17

## 2022-11-18 ENCOUNTER — TELEPHONE (OUTPATIENT)
Dept: FAMILY MEDICINE CLINIC | Facility: CLINIC | Age: 37
End: 2022-11-18

## 2022-11-18 NOTE — PROGRESS NOTES
Per Dr. Butler, Ms. Gasca has been called with recent lab results & recommendations.  Continue current medications and follow-up as planned or sooner if any problems.     She wanted to know what would happen if she did not take the Vitamin D.   I did tell her it could lead to Fatigue, muscle aches, pain and even some mild depression.   I also told her it could also cause some weakening of her bones which could lead to osteopetrosis as she gets older which can cause fractures if she falls.

## 2022-11-18 NOTE — TELEPHONE ENCOUNTER
Per Dr. Butler, Ms. Gasca has been called with recent lab results & recommendations.  Continue current medications and follow-up as planned or sooner if any problems.     She wanted to know what would happen if she did not take the Vitamin D.   I did tell her it could lead to Fatigue, muscle aches, pain and even some mild depression.   I also told her it could also cause some weakening of her bones which could lead to osteopetrosis as she gets older which can cause fractures if she falls.       ----- Message from Niecy Butler MD sent at 11/17/2022 12:26 AM CST -----  Thyroid, blood counts (no anemia), kidney/liver function and vitamin B12 normal.  Vitamin D is low - sent weekly supplement to the pharmacy.  Thanks, HAI Butler

## 2022-11-26 ENCOUNTER — NURSE TRIAGE (OUTPATIENT)
Dept: CALL CENTER | Facility: HOSPITAL | Age: 37
End: 2022-11-26

## 2022-11-26 NOTE — TELEPHONE ENCOUNTER
Caller reports she is having issues with anxiety, panic attacks, requesting to speak with someone over the phone such as hotline. States recently started counseling  With Jose Luu in [place]. Attempted to call but no provider is on call. Caller also reports she has a fear of germs and has 2 year old and 8 month old that she fears will get sick if plays like any other child on the floor. Currently she states she is experiencing some N/V and diarrhea over the last 12 hours but states is afebrile. Caller further states she is taking liquids in small sips. Denies chest pain, soa, or suicidal ideations but does state she gets upset when children play on the floor. Reviewed with caller that she may have a viral illness as well but caller believes physical symptoms related to her anxiety. This call was transferred to  [phone] for further assistance on counseling hotline.    Reason for Disposition  • Requesting to talk to a counselor (e.g., mental health worker, psychiatrist)    Additional Information  • Negative: SEVERE difficulty breathing (e.g., struggling for each breath, speaks in single words)  • Negative: Bluish (or gray) lips or face now  • Negative: Difficult to awaken or acting confused (e.g., disoriented, slurred speech)  • Negative: Hysterical or combative behavior  • Negative: Sounds like a life-threatening emergency to the triager  • Negative: Chest pain  • Negative: Palpitations, skipped heart beat, or rapid heart beat  • Negative: Cough is main symptom  • Negative: Suicide thoughts, threats, attempts, or questions  • Negative: Depression is main problem or symptom (e.g., feelings of sadness or hopelessness)  • Negative: [1] Difficulty breathing AND [2] persists > 10 minutes AND [3] not relieved by reassurance provided by triager  • Negative: [1] Lightheadedness or dizziness AND [2] persists > 10 minutes AND [3] not relieved by reassurance provided by triager  • Negative: [1] Alcohol or drug use,  "known or suspected AND [2] feeling very shaky (i.e., visible tremors of hands)  • Negative: Patient sounds very sick or weak to the triager  • Negative: Symptoms interfere with work or school    Answer Assessment - Initial Assessment Questions  1. CONCERN: \"What happened that made you call today?\"      Overwhelmed with \"germs\"  2. ANXIETY SYMPTOM SCREENING: \"Can you describe how you have been feeling?\"  (e.g., tense, restless, panicky, anxious, keyed up, trouble sleeping, trouble concentrating)      Tense, keyed up  3. ONSET: \"How long have you been feeling this way?\"      Several months  4. RECURRENT: \"Have you felt this way before?\"  If Yes, ask: \"What happened that time?\" \"What helped these feelings go away in the past?\"       Yes  5. RISK OF HARM - SUICIDAL IDEATION:  \"Do you ever have thoughts of hurting or killing yourself?\"  (e.g., yes, no, no but preoccupation with thoughts about death)    - INTENT:  \"Do you have thoughts of hurting or killing yourself right NOW?\" (e.g., yes, no, N/A)    - PLAN: \"Do you have a specific plan for how you would do this?\" (e.g., gun, knife, overdose, no plan, N/A)      No  6. RISK OF HARM - HOMICIDAL IDEATION:  \"Do you ever have thoughts of hurting or killing someone else?\"  (e.g., yes, no, no but preoccupation with thoughts about death)    - INTENT:  \"Do you have thoughts of hurting or killing someone right NOW?\" (e.g., yes, no, N/A)    - PLAN: \"Do you have a specific plan for how you would do this?\" (e.g., gun, knife, no plan, N/A)       No  7. FUNCTIONAL IMPAIRMENT: \"How have things been going for you overall? Have you had more difficulty than usual doing your normal daily activities?\"  (e.g., better, same, worse; self-care, school, work, interactions)      Worse  8. SUPPORT: \"Who is with you now?\" \"Who do you live with?\" \"Do you have family or friends who you can talk to?\"        and children  9. THERAPIST: \"Do you have a counselor or therapist? Name?\"      Yes. Jose " "Mings  10. STRESSORS: \"Has there been any new stress or recent changes in your life?\"        New baby  11. CAFFEINE USE: \"Do you drink caffeinated beverages, and how much each day?\" (e.g., coffee, tea, jaye)        Yes  12. ALCOHOL USE OR SUBSTANCE USE (DRUG USE): \"Do you drink alcohol or use any illegal drugs?\"        NO  13. OTHER SYMPTOMS: \"Do you have any other physical symptoms right now?\" (e.g., chest pain, palpitations, difficulty breathing, fever)        Hands drawing up. Has had N/V and diarrhea throughout the night.  14. PREGNANCY: \"Is there any chance you are pregnant?\" \"When was your last menstrual period?\"    On [facility]    Protocols used: ANXIETY AND PANIC ATTACK-ADULT-      "

## 2022-11-30 ENCOUNTER — TELEPHONE (OUTPATIENT)
Dept: FAMILY MEDICINE CLINIC | Facility: CLINIC | Age: 37
End: 2022-11-30

## 2023-01-10 ENCOUNTER — TELEPHONE (OUTPATIENT)
Dept: FAMILY MEDICINE CLINIC | Facility: CLINIC | Age: 38
End: 2023-01-10
Payer: COMMERCIAL

## 2023-01-10 NOTE — TELEPHONE ENCOUNTER
"Patient called stating 2 days ago she noticed on her right side in her \"love handle\" there is a hole. Patient said the skin is not broken and does not have anything oozing out. Patient said it was just sore and tender. Please call [phone]   "

## 2023-01-16 ENCOUNTER — OFFICE VISIT (OUTPATIENT)
Dept: FAMILY MEDICINE CLINIC | Facility: CLINIC | Age: 38
End: 2023-01-16
Payer: COMMERCIAL

## 2023-01-16 VITALS
HEIGHT: 63 IN | HEART RATE: 97 BPM | SYSTOLIC BLOOD PRESSURE: 106 MMHG | BODY MASS INDEX: 21.44 KG/M2 | WEIGHT: 121 LBS | OXYGEN SATURATION: 97 % | DIASTOLIC BLOOD PRESSURE: 70 MMHG

## 2023-01-16 DIAGNOSIS — F42.9 OBSESSIVE-COMPULSIVE DISORDER, UNSPECIFIED TYPE: ICD-10-CM

## 2023-01-16 DIAGNOSIS — L98.9 SKIN ABNORMALITIES: Primary | ICD-10-CM

## 2023-01-16 DIAGNOSIS — F41.9 ANXIETY: ICD-10-CM

## 2023-01-16 DIAGNOSIS — G89.29 CHRONIC BILATERAL LOW BACK PAIN, UNSPECIFIED WHETHER SCIATICA PRESENT: ICD-10-CM

## 2023-01-16 DIAGNOSIS — M54.50 CHRONIC BILATERAL LOW BACK PAIN, UNSPECIFIED WHETHER SCIATICA PRESENT: ICD-10-CM

## 2023-01-16 PROCEDURE — 99213 OFFICE O/P EST LOW 20 MIN: CPT | Performed by: FAMILY MEDICINE

## 2023-01-16 RX ORDER — SOD CHLORD/LANOLIN/MIN.OIL/PET
LOTION (ML) TOPICAL
COMMUNITY

## 2023-01-16 NOTE — PROGRESS NOTES
"Chief Complaint  Hip Pain (Sunken in spot on right hip/back area )    Subjective    History of Present Illness {CC  Problem List  Visit  Diagnosis   Encounters  Notes  Medications  Labs  Result Review Imaging  Media :23}     Zahra Gasca presents to [facility] [place] for     Chief Complaint   Patient presents with   • Hip Pain     Sunken in spot on right hip/back area         Patient seen today for follow-up.  Has 2 areas that she is concerned about on her skin where spots are sunken in or pushing out.  The first area is on the posterior right hip that is sunken in.  The area patching out is in the left lower pelvic area/groin at left side of  incision.  Patient notes that these areas are not painful.  Occasionally has a twinge in the one on the back.  She continues to struggle with anxiety.  Has not tolerated medications in the past.  Was doing therapy for a few sessions, but limited due to cost.  Continues to have chronic low back pain.      Current Outpatient Medications:   •  Skin Protectants, Misc. (CeraVe) ointment, Apply 1 application topically As Needed (for skin dryness)., Disp: 340 g, Rfl: 3  •  vitamin D (ERGOCALCIFEROL) 1.25 MG (54017 UT) capsule capsule, Take 1 capsule by mouth 1 (One) Time Per Week., Disp: 4 capsule, Rfl: 2     Objective       Vital Signs:   /70   Pulse 97   Ht 160 cm (63\")   Wt 54.9 kg (121 lb)   SpO2 97%   BMI 21.43 kg/m²     Physical Exam  Vitals reviewed.   Constitutional:       General: She is not in acute distress.     Appearance: She is well-developed.   Cardiovascular:      Rate and Rhythm: Normal rate and regular rhythm.      Heart sounds: Normal heart sounds. No murmur heard.  Pulmonary:      Effort: Pulmonary effort is normal. No respiratory distress.      Breath sounds: Normal breath sounds. No wheezing or rales.   Abdominal:      Palpations: Abdomen is soft.      Tenderness: There is no " abdominal tenderness.   Skin:     General: Skin is warm and dry.      Findings: No rash.             Comments: Skin dimpling area on posterior right hip, left groin has slight protuberance of subcutaneous tissue without distinct mass   Neurological:      Mental Status: She is alert and oriented to person, place, and time.        Result Review :{ Labs  Result Review  Imaging  Med Tab  Media :23}   The following data was reviewed by: Niecy Butler MD on 01/16/2023    Common labs    Common Labs 11/14/22 11/14/22 11/14/22    1312 1312 1312   Glucose  92    BUN  9    Creatinine  0.91    Sodium  142    Potassium  4.4    Chloride  106    Calcium  9.7    Albumin  4.80    Total Bilirubin  0.6    Alkaline Phosphatase  72    AST (SGOT)  15    ALT (SGPT)  9    WBC   5.28   Hemoglobin   14.3   Hematocrit   42.4   Platelets   293   Hemoglobin A1C 5.40     (A) Abnormal value                      Assessment and Plan {CC Problem List  Visit Diagnosis  ROS  Review (Popup)  Health Maintenance  Quality  BestPractice  Medications  SmartSets  SnapShot Encounters  Media :23}   Diagnoses and all orders for this visit:    1. Skin abnormalities (Primary)  -     US Soft Tissue; Future  -     US Soft Tissue; Future    2. Anxiety    3. Obsessive-compulsive disorder, unspecified type    4. Chronic bilateral low back pain, unspecified whether sciatica present      Skin abnormalities present on exam today  Dimpling/skin indention on right posterior side and slight protrusion of subcutaneous tissue on left lower pelvis/left groin  Recommended ultrasound evaluations of these abnormalities, ordered today  Continue management of anxiety and OCD, back into therapy whenever she is able  Chronic low back pain, recommended home exercises for core strength including back exercises and abdominal exercises      Follow Up {Instructions Charge Capture  Follow-up Communications :23}   Return in about 6 months (around 7/16/2023) for  Annual physical, Recheck.  Patient was given instructions and counseling regarding her condition or for health maintenance advice. Please see specific information pulled into the AVS if appropriate.            This document has been electronically signed by Niecy Butler MD

## 2023-01-23 ENCOUNTER — HOSPITAL ENCOUNTER (OUTPATIENT)
Dept: ULTRASOUND IMAGING | Facility: HOSPITAL | Age: 38
Discharge: HOME | End: 2023-01-23
Payer: COMMERCIAL

## 2023-03-03 ENCOUNTER — PROCEDURE VISIT (OUTPATIENT)
Dept: OBSTETRICS AND GYNECOLOGY | Facility: CLINIC | Age: 38
End: 2023-03-03
Payer: COMMERCIAL

## 2023-03-03 VITALS
DIASTOLIC BLOOD PRESSURE: 58 MMHG | HEIGHT: 63 IN | WEIGHT: 118 LBS | SYSTOLIC BLOOD PRESSURE: 100 MMHG | BODY MASS INDEX: 20.91 KG/M2

## 2023-03-03 DIAGNOSIS — Z30.433 ENCOUNTER FOR REMOVAL AND REINSERTION OF INTRAUTERINE CONTRACEPTIVE DEVICE: Primary | ICD-10-CM

## 2023-03-03 PROBLEM — O44.00 PLACENTA PREVIA ANTEPARTUM: Status: RESOLVED | Noted: 2022-01-22 | Resolved: 2023-03-03

## 2023-03-03 PROBLEM — O44.03 PLACENTA PREVIA IN THIRD TRIMESTER: Status: RESOLVED | Noted: 2021-11-17 | Resolved: 2023-03-03

## 2023-03-03 PROBLEM — O99.340 ANXIETY DURING PREGNANCY: Status: RESOLVED | Noted: 2021-08-21 | Resolved: 2023-03-03

## 2023-03-03 PROBLEM — Z87.59 HISTORY OF SEVERE PRE-ECLAMPSIA: Status: RESOLVED | Noted: 2021-08-21 | Resolved: 2023-03-03

## 2023-03-03 PROBLEM — O09.93 SUPERVISION OF HIGH RISK PREGNANCY IN THIRD TRIMESTER: Status: RESOLVED | Noted: 2021-09-14 | Resolved: 2023-03-03

## 2023-03-03 PROBLEM — N20.0 NEPHROLITHIASIS: Status: RESOLVED | Noted: 2021-11-18 | Resolved: 2023-03-03

## 2023-03-03 PROBLEM — O09.523 MULTIGRAVIDA OF ADVANCED MATERNAL AGE IN THIRD TRIMESTER: Status: RESOLVED | Noted: 2021-08-21 | Resolved: 2023-03-03

## 2023-03-03 PROBLEM — F41.9 ANXIETY DURING PREGNANCY: Status: RESOLVED | Noted: 2021-08-21 | Resolved: 2023-03-03

## 2023-03-03 PROBLEM — O34.219 PREVIOUS CESAREAN DELIVERY AFFECTING PREGNANCY: Status: RESOLVED | Noted: 2021-09-14 | Resolved: 2023-03-03

## 2023-03-03 PROBLEM — O21.9 NAUSEA AND VOMITING DURING PREGNANCY PRIOR TO 22 WEEKS GESTATION: Status: RESOLVED | Noted: 2021-08-21 | Resolved: 2023-03-03

## 2023-03-03 PROCEDURE — 58301 REMOVE INTRAUTERINE DEVICE: CPT | Performed by: NURSE PRACTITIONER

## 2023-03-03 PROCEDURE — 58300 INSERT INTRAUTERINE DEVICE: CPT | Performed by: NURSE PRACTITIONER

## 2023-03-03 RX ORDER — COPPER 313.4 MG/1
INTRAUTERINE DEVICE INTRAUTERINE ONCE
Status: COMPLETED | OUTPATIENT
Start: 2023-03-03 | End: 2023-03-03

## 2023-03-03 RX ADMIN — COPPER: 313.4 INTRAUTERINE DEVICE INTRAUTERINE at 11:02

## 2023-03-03 NOTE — PROGRESS NOTES
IUD Removal and Immediate Reinsertion    Patient's last menstrual period was 02/15/2023 (approximate).    Date of procedure:  3/3/2023    Risks and benefits discussed? yes  All questions answered? yes  Consents given by the patient  Written consent obtained? yes  Reason for removal: Side effect: hormone changes    Local anesthesia used:  no    Procedure documentation:    A speculum was placed in order to view the cervix.  Cervical dilation did not need to be performed in order to access the string.  The IUD string was easily seen.  The string was grasped and the IUD was removed without difficulty.  The IUD did not appear to be adherent to the uterine cavity. It was removed intact.    The cervix was cleansed with an antiseptic solution.  The anterior lip of the cervix was grasped with a tenaculum and the uterine cavity was gently sounded.  There was no difficulty passing the sound through the cervix.  Cervical dilation did not need to be performed prior to pacing the IUD.  The uterus was midposition and sounded to 7 cms.  The ParaGard was then prepared per the manufacturers instructions.    The Paraguard was advanced through the cervical canal until the upper edge of the flange was flush with the external cervix.  The insertion lona was held in place while the insertion tube was withdrawn.  I waited 10-15 seconds for the arms of the IUS to fully open and the devise to seat in the cavity.  The lona was removed first followed by the insertion tube.. The string was cut 3 cms in length.  Bleeding from the cervix was scant.    She tolerated the procedure without any difficulty.     Paraguard  [id number]    Post procedure instructions: Call if any fever or excessive bleeding or pain.    Follow up needed: As needed or for annual exam.    This note was electronically signed.    Sonya Carias, JOANNA  March 3, 2023

## 2023-07-28 ENCOUNTER — OFFICE VISIT (OUTPATIENT)
Dept: FAMILY MEDICINE CLINIC | Facility: CLINIC | Age: 38
End: 2023-07-28
Payer: COMMERCIAL

## 2023-07-28 VITALS
DIASTOLIC BLOOD PRESSURE: 80 MMHG | BODY MASS INDEX: 20.73 KG/M2 | HEART RATE: 84 BPM | HEIGHT: 63 IN | WEIGHT: 117 LBS | OXYGEN SATURATION: 97 % | SYSTOLIC BLOOD PRESSURE: 120 MMHG

## 2023-07-28 DIAGNOSIS — F41.9 ANXIETY: ICD-10-CM

## 2023-07-28 DIAGNOSIS — Z00.00 ANNUAL PHYSICAL EXAM: Primary | ICD-10-CM

## 2023-07-28 DIAGNOSIS — E55.9 VITAMIN D DEFICIENCY: ICD-10-CM

## 2023-07-28 DIAGNOSIS — Z91.09 ENVIRONMENTAL ALLERGIES: ICD-10-CM

## 2023-07-28 PROCEDURE — 99395 PREV VISIT EST AGE 18-39: CPT | Performed by: FAMILY MEDICINE

## 2023-07-28 RX ORDER — IBUPROFEN 200 MG
200 TABLET ORAL EVERY 6 HOURS PRN
COMMUNITY

## 2023-07-28 RX ORDER — CETIRIZINE HYDROCHLORIDE 10 MG/1
10 TABLET ORAL DAILY
Qty: 30 TABLET | Refills: 5 | Status: SHIPPED | OUTPATIENT
Start: 2023-07-28

## 2023-07-28 RX ORDER — ACETAMINOPHEN 500 MG
500 TABLET ORAL EVERY 6 HOURS PRN
COMMUNITY

## 2023-07-28 RX ORDER — ERGOCALCIFEROL 1.25 MG/1
50000 CAPSULE ORAL WEEKLY
Qty: 4 CAPSULE | Refills: 2 | Status: SHIPPED | OUTPATIENT
Start: 2023-07-28

## 2023-07-28 NOTE — PROGRESS NOTES
"Chief Complaint  Annual Exam    Subjective    History of Present Illness {CC  Problem List  Visit  Diagnosis   Encounters  Notes  Medications  Labs  Result Review Imaging  Media :23}     Zahra Gasca presents to [facility] [place] for     Chief Complaint   Patient presents with    Annual Exam      Zahra Gasca is a 38 y.o. year old presenting to be seen for her annual exam.  Having increased anxiety symptoms and irritability.    She exercises regularly: yes, exercises with bands at home, walking as well  Healthy Diet: fair, still some greasy foods .  She wears her seat belt:yes.  She has concerns about domestic violence: no.    She is sexually active.    In the past 12 months there has not been new sexual partners.    She would not like to be screened for STD's at today's exam.   She is using IUD for contraception.    LMP: 23 (approx)  Periods Regular: yes.    OB History          2    Para   2    Term   1       1    AB        Living   2         SAB        IAB        Ectopic        Molar        Multiple   0    Live Births   2              She is taking Vit D and Calcium:no  Last colonoscopy or FIT test: n/a  Last DEXA: n/a  Last PAP: 10/29/2020  Last Mammo: n/a    Immunization status: up to date and documented.    The following portions of the patient's history were reviewed and updated as appropriate:problem list, current medications, allergies, past family history, past medical history, past social history, and past surgical history.       Current Outpatient Medications:     acetaminophen (TYLENOL) 500 MG tablet, Take 1 tablet by mouth Every 6 (Six) Hours As Needed for Mild Pain., Disp: , Rfl:     ibuprofen (ADVIL,MOTRIN) 200 MG tablet, Take 1 tablet by mouth Every 6 (Six) Hours As Needed for Mild Pain., Disp: , Rfl:      Objective       Vital Signs:   /80   Pulse 84   Ht 160 cm (63\")   Wt 53.1 kg (117 lb)   SpO2 " 97%   BMI 20.73 kg/mý     Physical Exam  Vitals reviewed.   Constitutional:       General: She is not in acute distress.     Appearance: She is well-developed.   HENT:      Right Ear: Tympanic membrane and ear canal normal.      Left Ear: Tympanic membrane and ear canal normal.   Eyes:      Conjunctiva/sclera: Conjunctivae normal.      Pupils: Pupils are equal, round, and reactive to light.   Cardiovascular:      Rate and Rhythm: Normal rate and regular rhythm.      Heart sounds: Normal heart sounds. No murmur heard.  Pulmonary:      Effort: Pulmonary effort is normal. No respiratory distress.      Breath sounds: Normal breath sounds. No wheezing or rales.   Abdominal:      Palpations: Abdomen is soft.      Tenderness: There is no abdominal tenderness.   Musculoskeletal:      Cervical back: Neck supple.   Skin:     General: Skin is warm and dry.      Findings: No rash.   Neurological:      Mental Status: She is alert and oriented to person, place, and time.        Result Review :{ Labs  Result Review  Imaging  Med Tab  Media :23}   The following data was reviewed by: Niecy Butler MD on 07/28/2023    Common labs          11/14/2022    13:12   Common Labs   Glucose 92    BUN 9    Creatinine 0.91    Sodium 142    Potassium 4.4    Chloride 106    Calcium 9.7    Albumin 4.80    Total Bilirubin 0.6    Alkaline Phosphatase 72    AST (SGOT) 15    ALT (SGPT) 9    WBC 5.28    Hemoglobin 14.3    Hematocrit 42.4    Platelets 293    Hemoglobin A1C 5.40              Assessment and Plan {CC Problem List  Visit Diagnosis  ROS  Review (Popup)  Health Maintenance  Quality  BestPractice  Medications  SmartSets  SnapShot Encounters  Media :23}   Diagnoses and all orders for this visit:    1. Annual physical exam (Primary)    2. Anxiety  -     Ambulatory Referral to Psychiatry    3. Environmental allergies  -     cetirizine (zyrTEC) 10 MG tablet; Take 1 tablet by mouth Daily.  Dispense: 30 tablet; Refill: 5    4.  Vitamin D deficiency  -     vitamin D (ERGOCALCIFEROL) 1.25 MG (62240 UT) capsule capsule; Take 1 capsule by mouth 1 (One) Time Per Week.  Dispense: 4 capsule; Refill: 2       Annual exam completed today  Discussed recommended screenings and immunizations  Encouraged healthy diet and exercise  Body mass index is 20.73 kg/mý.  BMI is within normal parameters. No other follow-up for BMI required.   Anxiety not well controlled - referral to psychiatry to discuss possible options  Environmental allergies - encouraged daily zyrtec  Vitamin D deficiency, supplement provided      Follow Up {Instructions Charge Capture  Follow-up Communications :23}   Return in about 4 months (around 11/28/2023) for Recheck.  Patient was given instructions and counseling regarding her condition or for health maintenance advice. Please see specific information pulled into the AVS if appropriate.            This document has been electronically signed by Niecy Butler MD

## 2024-07-05 NOTE — PLAN OF CARE
----- Message from Dragana Durdevic, MD sent at 7/5/2024  9:32 AM CDT -----  Increase lisinopril to 40 milligrams daily.  Will probably need new prescription.  BP check in 2 weeks.    ----- Message -----  From: Lauren Verduzco RN  Sent: 7/5/2024   9:26 AM CDT  To: Dragana Durdevic, MD       Goal Outcome Evaluation:  Plan of Care Reviewed With: patient        Progress: no change  Outcome Summary: VSS, denies any vaginal bleeding, leaking of fluids, or pain,  NST reactive

## (undated) DEVICE — TBG PENCL TELESCP MEGADYNE SMOKE EVAC 10FT

## (undated) DEVICE — SUT VIC 0 CTX 36IN J978H

## (undated) DEVICE — SUT MNCRYL 3/0 Y936H

## (undated) DEVICE — TRY SPINE PENCAN 24GA X4IN

## (undated) DEVICE — SPNG LAP 18X18IN LF STRL PK/5

## (undated) DEVICE — NANOLINECORNERSTONESPROXIMAL END: ISO 80369-7: Brand: SONOPLEX

## (undated) DEVICE — GOWN,PREVENTION PLUS,XLNG/XXLARGE,STRL: Brand: MEDLINE

## (undated) DEVICE — SUT VIC 0 CT1 36IN J946H

## (undated) DEVICE — GARMENT,MEDLINE,DVT,INT,CALF,MED, GEN2: Brand: MEDLINE

## (undated) DEVICE — SUT  GUT PLAIN 2/0 CT1 27IN 843H

## (undated) DEVICE — PATIENT RETURN ELECTRODE, SINGLE-USE, CONTACT QUALITY MONITORING, ADULT, WITH 15 FT (4.5 M) CORD. FOR PATIENTS WEIGHING OVER 33LBS. (15KG): Brand: MEGADYNE

## (undated) DEVICE — STERILE POLYISOPRENE POWDER-FREE SURGICAL GLOVES WITH EMOLLIENT COATING: Brand: PROTEXIS

## (undated) DEVICE — GLV SURG SIGNATURE ESSENTIAL PF LTX SZ6.5

## (undated) DEVICE — PK C/SECT 60

## (undated) DEVICE — SYR LUERLOK 30CC

## (undated) DEVICE — SYS CLS SKIN PREMIERPRO EXOFINFUSION 22CM

## (undated) DEVICE — SUT VIC 2/0 SH 27IN

## (undated) DEVICE — DRP SURG UNIV BASIC BILAMINATE 53X77IN DISP

## (undated) DEVICE — SYS SKIN CLS DERMABOND PRINEO W/22CM MESH TP